# Patient Record
Sex: MALE | Race: WHITE | NOT HISPANIC OR LATINO | Employment: UNEMPLOYED | URBAN - NONMETROPOLITAN AREA
[De-identification: names, ages, dates, MRNs, and addresses within clinical notes are randomized per-mention and may not be internally consistent; named-entity substitution may affect disease eponyms.]

---
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
## 2019-01-06 ENCOUNTER — APPOINTMENT (OUTPATIENT)
Dept: LAB | Facility: HOSPITAL | Age: 19
End: 2019-01-06
Payer: COMMERCIAL

## 2019-01-06 ENCOUNTER — TRANSCRIBE ORDERS (OUTPATIENT)
Dept: ADMINISTRATIVE | Facility: HOSPITAL | Age: 19
End: 2019-01-06

## 2019-01-06 DIAGNOSIS — E13.8 DIABETES MELLITUS OF OTHER TYPE WITH COMPLICATION, UNSPECIFIED WHETHER LONG TERM INSULIN USE: Primary | ICD-10-CM

## 2019-01-06 DIAGNOSIS — E13.8 DIABETES MELLITUS OF OTHER TYPE WITH COMPLICATION, UNSPECIFIED WHETHER LONG TERM INSULIN USE: ICD-10-CM

## 2019-01-06 LAB
ANION GAP SERPL CALCULATED.3IONS-SCNC: 8 MMOL/L (ref 4–13)
BUN SERPL-MCNC: 6 MG/DL (ref 5–25)
CALCIUM SERPL-MCNC: 8.8 MG/DL (ref 8.3–10.1)
CHLORIDE SERPL-SCNC: 103 MMOL/L (ref 100–108)
CO2 SERPL-SCNC: 29 MMOL/L (ref 21–32)
CREAT SERPL-MCNC: 0.74 MG/DL (ref 0.6–1.3)
CREAT UR-MCNC: 47.6 MG/DL
EST. AVERAGE GLUCOSE BLD GHB EST-MCNC: 160 MG/DL
GFR SERPL CREATININE-BSD FRML MDRD: 134 ML/MIN/1.73SQ M
GLUCOSE P FAST SERPL-MCNC: 165 MG/DL (ref 65–99)
HBA1C MFR BLD: 7.2 % (ref 4.2–6.3)
MICROALBUMIN UR-MCNC: <5 MG/L (ref 0–20)
MICROALBUMIN/CREAT 24H UR: <11 MG/G CREATININE (ref 0–30)
POTASSIUM SERPL-SCNC: 4.4 MMOL/L (ref 3.5–5.3)
SODIUM SERPL-SCNC: 140 MMOL/L (ref 136–145)

## 2019-01-06 PROCEDURE — 84681 ASSAY OF C-PEPTIDE: CPT

## 2019-01-06 PROCEDURE — 80048 BASIC METABOLIC PNL TOTAL CA: CPT

## 2019-01-06 PROCEDURE — 82043 UR ALBUMIN QUANTITATIVE: CPT

## 2019-01-06 PROCEDURE — 83036 HEMOGLOBIN GLYCOSYLATED A1C: CPT

## 2019-01-06 PROCEDURE — 82570 ASSAY OF URINE CREATININE: CPT

## 2019-01-06 PROCEDURE — 36415 COLL VENOUS BLD VENIPUNCTURE: CPT

## 2019-01-08 LAB — C PEPTIDE SERPL-MCNC: 1.8 NG/ML (ref 1.1–4.4)

## 2019-01-19 ENCOUNTER — OFFICE VISIT (OUTPATIENT)
Dept: INTERNAL MEDICINE CLINIC | Facility: CLINIC | Age: 19
End: 2019-01-19
Payer: COMMERCIAL

## 2019-01-19 VITALS
DIASTOLIC BLOOD PRESSURE: 82 MMHG | SYSTOLIC BLOOD PRESSURE: 122 MMHG | TEMPERATURE: 96.1 F | RESPIRATION RATE: 18 BRPM | OXYGEN SATURATION: 98 % | WEIGHT: 163.8 LBS | BODY MASS INDEX: 26.33 KG/M2 | HEART RATE: 86 BPM | HEIGHT: 66 IN

## 2019-01-19 DIAGNOSIS — F63.81 INTERMITTENT EXPLOSIVE DISORDER: Primary | ICD-10-CM

## 2019-01-19 PROCEDURE — 99213 OFFICE O/P EST LOW 20 MIN: CPT | Performed by: PHYSICIAN ASSISTANT

## 2019-01-19 RX ORDER — OLANZAPINE 5 MG/1
10 TABLET ORAL
Qty: 60 TABLET | Refills: 2 | Status: SHIPPED | OUTPATIENT
Start: 2019-01-19 | End: 2019-05-02

## 2019-01-19 NOTE — PROGRESS NOTES
Assessment/Plan:    Intermittent explosive disorder  Will discontinue seroquel in favor of zyprexa to improve sedation and combat irritability and agitation  Recheck 1 month  Diagnoses and all orders for this visit:    Intermittent explosive disorder  -     OLANZapine (ZyPREXA) 5 mg tablet; Take 2 tablets (10 mg total) by mouth daily at bedtime          Subjective:      Patient ID: Bianca Maya is a 22 y o  male  Pt presents for evaluation of his mood and behavioral outbursts  At his last visit he was tried with seroquel  They titrated the dose to 100mg  He continued with outburts and then became very groggy the following morning  There is concern that he will not be able to tolerate the dose that is best for controlling his behaviors  Only other medication that was tried previously was ritalin  The following portions of the patient's history were reviewed and updated as appropriate:   He  has a past medical history of Allergic; Autism; Diabetes mellitus ([facility] [address] ); and Seasonal allergies  He   Patient Active Problem List    Diagnosis Date Noted    Intermittent explosive disorder 12/26/2018    Seasonal allergies 12/26/2018    Type 2 diabetes mellitus without complication, without long-term current use of insulin ([facility] [address] ) 12/26/2018    Acne vulgaris 12/26/2018     He  has a past surgical history that includes Cataract extraction, bilateral   His family history includes Alcohol abuse in his maternal grandmother; Mental illness in his maternal aunt; No Known Problems in his father and mother  He  reports that he is a non-smoker but has been exposed to tobacco smoke  He has never used smokeless tobacco  He reports that he does not drink alcohol or use drugs    Current Outpatient Prescriptions   Medication Sig Dispense Refill    Apple Cider Vinegar 600 MG CAPS Take 1,200 mg by mouth 3 (three) times a day with meals      cetirizine (ZyrTEC) 10 mg tablet Take 10 mg by mouth daily      Cholecalciferol (D3 VITAMIN PO) Take 5,000 Units by mouth daily      Cyanocobalamin (VITAMIN B-12) 6000 MCG SUBL Place 6,000 mcg under the tongue daily      doxycycline hyclate (VIBRAMYCIN) 100 mg capsule Take 100 mg by mouth daily      fluticasone (FLONASE) 50 mcg/act nasal spray 2 sprays into each nostril daily      metFORMIN (GLUCOPHAGE) 500 mg tablet Take 1,000 mg by mouth 2 (two) times a day with meals        OLANZapine (ZyPREXA) 5 mg tablet Take 2 tablets (10 mg total) by mouth daily at bedtime 60 tablet 2     No current facility-administered medications for this visit  Current Outpatient Prescriptions on File Prior to Visit   Medication Sig    Apple Cider Vinegar 600 MG CAPS Take 1,200 mg by mouth 3 (three) times a day with meals    cetirizine (ZyrTEC) 10 mg tablet Take 10 mg by mouth daily    Cholecalciferol (D3 VITAMIN PO) Take 5,000 Units by mouth daily    Cyanocobalamin (VITAMIN B-12) 6000 MCG SUBL Place 6,000 mcg under the tongue daily    doxycycline hyclate (VIBRAMYCIN) 100 mg capsule Take 100 mg by mouth daily    fluticasone (FLONASE) 50 mcg/act nasal spray 2 sprays into each nostril daily    metFORMIN (GLUCOPHAGE) 500 mg tablet Take 1,000 mg by mouth 2 (two) times a day with meals      [DISCONTINUED] QUEtiapine (SEROQUEL XR) 50 mg Take 1 tablet (50 mg total) by mouth daily at bedtime (Patient taking differently: Take 100 mg by mouth daily at bedtime  )     No current facility-administered medications on file prior to visit  He has No Known Allergies       Review of Systems   Constitutional: Negative for chills and fever  HENT: Negative for congestion, ear pain, hearing loss, postnasal drip, rhinorrhea, sinus pain, sinus pressure, sore throat and trouble swallowing  Eyes: Negative for pain and visual disturbance  Respiratory: Negative for cough, chest tightness, shortness of breath and wheezing  Cardiovascular: Negative  Negative for chest pain, palpitations and leg swelling  Gastrointestinal: Negative for abdominal pain, blood in stool, constipation, diarrhea, nausea and vomiting  Endocrine: Negative for cold intolerance, heat intolerance, polydipsia, polyphagia and polyuria  Genitourinary: Negative for difficulty urinating, dysuria, flank pain and urgency  Musculoskeletal: Negative for arthralgias, back pain, gait problem and myalgias  Skin: Negative for rash  Allergic/Immunologic: Negative  Neurological: Negative for dizziness, weakness, light-headedness and headaches  Hematological: Negative  Psychiatric/Behavioral: Positive for agitation and behavioral problems  Negative for dysphoric mood and sleep disturbance  The patient is not nervous/anxious  Objective:      /82 (BP Location: Left arm, Patient Position: Sitting, Cuff Size: Adult)   Pulse 86   Temp (!) 96 1 °F (35 6 °C) (Tympanic)   Resp 18   Ht 5' 6" (1 676 m)   Wt 74 3 kg (163 lb 12 8 oz)   SpO2 98%   BMI 26 44 kg/m²          Physical Exam   Constitutional: He is oriented to person, place, and time  He appears well-developed and well-nourished  No distress  HENT:   Head: Normocephalic and atraumatic  Right Ear: External ear normal    Left Ear: External ear normal    Nose: Nose normal    Mouth/Throat: Oropharynx is clear and moist  No oropharyngeal exudate  Eyes: Pupils are equal, round, and reactive to light  Conjunctivae and EOM are normal  Right eye exhibits no discharge  Left eye exhibits no discharge  No scleral icterus  Neck: Normal range of motion  Neck supple  No thyromegaly present  Cardiovascular: Normal rate, regular rhythm and normal heart sounds  Exam reveals no gallop and no friction rub  No murmur heard  Pulmonary/Chest: Effort normal and breath sounds normal  No respiratory distress  He has no wheezes  He has no rales  Abdominal: Soft  Bowel sounds are normal  He exhibits no distension  There is no tenderness  Musculoskeletal: Normal range of motion  He exhibits no edema, tenderness or deformity  Neurological: He is alert and oriented to person, place, and time  No cranial nerve deficit  Skin: Skin is warm and dry  He is not diaphoretic  Psychiatric: His behavior is normal  Judgment and thought content normal  His affect is labile

## 2019-01-19 NOTE — ASSESSMENT & PLAN NOTE
Will discontinue seroquel in favor of zyprexa to improve sedation and combat irritability and agitation  Recheck 1 month

## 2019-02-23 ENCOUNTER — OFFICE VISIT (OUTPATIENT)
Dept: INTERNAL MEDICINE CLINIC | Facility: CLINIC | Age: 19
End: 2019-02-23
Payer: COMMERCIAL

## 2019-02-23 VITALS
HEART RATE: 84 BPM | DIASTOLIC BLOOD PRESSURE: 74 MMHG | BODY MASS INDEX: 26.03 KG/M2 | WEIGHT: 162 LBS | OXYGEN SATURATION: 95 % | SYSTOLIC BLOOD PRESSURE: 106 MMHG | TEMPERATURE: 97 F | HEIGHT: 66 IN | RESPIRATION RATE: 18 BRPM

## 2019-02-23 DIAGNOSIS — F63.81 INTERMITTENT EXPLOSIVE DISORDER: Primary | ICD-10-CM

## 2019-02-23 PROCEDURE — 3008F BODY MASS INDEX DOCD: CPT | Performed by: PHYSICIAN ASSISTANT

## 2019-02-23 PROCEDURE — 99214 OFFICE O/P EST MOD 30 MIN: CPT | Performed by: PHYSICIAN ASSISTANT

## 2019-02-23 NOTE — PROGRESS NOTES
Assessment/Plan:    Intermittent explosive disorder  Pts symptoms are stable with current regime  No changes at present  Diagnoses and all orders for this visit:    Intermittent explosive disorder          Subjective:      Patient ID: Miah Larkin is a 22 y o  male  Pt presents for 1 month follow up  He is doing much better with zyprexa  He tolerates this better and behaviors are much improved  He only had 2 bad days since his last visit 1 month ago and they were not consecutive  He has only been taking 5mg and this works well       The following portions of the patient's history were reviewed and updated as appropriate:   He  has a past medical history of Allergic, Autism, Diabetes mellitus ([facility] ), and Seasonal allergies  He   Patient Active Problem List    Diagnosis Date Noted    Intermittent explosive disorder 12/26/2018    Seasonal allergies 12/26/2018    Type 2 diabetes mellitus without complication, without long-term current use of insulin ([facility] ) 12/26/2018    Acne vulgaris 12/26/2018     He  has a past surgical history that includes Cataract extraction, bilateral   His family history includes Alcohol abuse in his maternal grandmother; Mental illness in his maternal aunt; No Known Problems in his father and mother  He  reports that he is a non-smoker but has been exposed to tobacco smoke  He has never used smokeless tobacco  He reports that he does not drink alcohol or use drugs    Current Outpatient Medications   Medication Sig Dispense Refill    Apple Cider Vinegar 600 MG CAPS Take 1,200 mg by mouth 3 (three) times a day with meals      cetirizine (ZyrTEC) 10 mg tablet Take 10 mg by mouth daily      Cholecalciferol (D3 VITAMIN PO) Take 5,000 Units by mouth daily      Cyanocobalamin (VITAMIN B-12) 6000 MCG SUBL Place 6,000 mcg under the tongue daily      doxycycline hyclate (VIBRAMYCIN) 100 mg capsule Take 100 mg by mouth daily      fluticasone (FLONASE) 50 mcg/act nasal spray 2 sprays into each nostril daily      metFORMIN (GLUCOPHAGE) 500 mg tablet Take 1,000 mg by mouth 2 (two) times a day with meals        OLANZapine (ZyPREXA) 5 mg tablet Take 2 tablets (10 mg total) by mouth daily at bedtime 60 tablet 2     No current facility-administered medications for this visit  Current Outpatient Medications on File Prior to Visit   Medication Sig    Apple Cider Vinegar 600 MG CAPS Take 1,200 mg by mouth 3 (three) times a day with meals    cetirizine (ZyrTEC) 10 mg tablet Take 10 mg by mouth daily    Cholecalciferol (D3 VITAMIN PO) Take 5,000 Units by mouth daily    Cyanocobalamin (VITAMIN B-12) 6000 MCG SUBL Place 6,000 mcg under the tongue daily    doxycycline hyclate (VIBRAMYCIN) 100 mg capsule Take 100 mg by mouth daily    fluticasone (FLONASE) 50 mcg/act nasal spray 2 sprays into each nostril daily    metFORMIN (GLUCOPHAGE) 500 mg tablet Take 1,000 mg by mouth 2 (two) times a day with meals      OLANZapine (ZyPREXA) 5 mg tablet Take 2 tablets (10 mg total) by mouth daily at bedtime     No current facility-administered medications on file prior to visit  He has No Known Allergies       Review of Systems   Constitutional: Negative for chills and fever  HENT: Negative for congestion, ear pain, hearing loss, postnasal drip, rhinorrhea, sinus pressure, sinus pain, sore throat and trouble swallowing  Eyes: Negative for pain and visual disturbance  Respiratory: Negative for cough, chest tightness, shortness of breath and wheezing  Cardiovascular: Negative  Negative for chest pain, palpitations and leg swelling  Gastrointestinal: Negative for abdominal pain, blood in stool, constipation, diarrhea, nausea and vomiting  Endocrine: Negative for cold intolerance, heat intolerance, polydipsia, polyphagia and polyuria  Genitourinary: Negative for difficulty urinating, dysuria, flank pain and urgency     Musculoskeletal: Negative for arthralgias, back pain, gait problem and myalgias  Skin: Negative for rash  Allergic/Immunologic: Negative  Neurological: Negative for dizziness, weakness, light-headedness and headaches  Hematological: Negative  Psychiatric/Behavioral: Negative for behavioral problems, dysphoric mood and sleep disturbance  The patient is not nervous/anxious  Objective:      /74 (BP Location: Left arm, Patient Position: Sitting, Cuff Size: Adult)   Pulse 84   Temp (!) 97 °F (36 1 °C) (Tympanic)   Resp 18   Ht 5' 6" (1 676 m)   Wt 73 5 kg (162 lb)   SpO2 95%   BMI 26 15 kg/m²          Physical Exam   Constitutional: He is oriented to person, place, and time  He appears well-developed and well-nourished  No distress  HENT:   Head: Normocephalic and atraumatic  Right Ear: External ear normal    Left Ear: External ear normal    Nose: Nose normal    Mouth/Throat: Oropharynx is clear and moist  No oropharyngeal exudate  Eyes: Pupils are equal, round, and reactive to light  Conjunctivae and EOM are normal  Right eye exhibits no discharge  Left eye exhibits no discharge  No scleral icterus  Neck: Normal range of motion  Neck supple  No thyromegaly present  Cardiovascular: Normal rate, regular rhythm and normal heart sounds  Exam reveals no gallop and no friction rub  No murmur heard  Pulmonary/Chest: Effort normal and breath sounds normal  No respiratory distress  He has no wheezes  He has no rales  Abdominal: Soft  Bowel sounds are normal  He exhibits no distension  There is no tenderness  Musculoskeletal: Normal range of motion  He exhibits no edema, tenderness or deformity  Neurological: He is alert and oriented to person, place, and time  No cranial nerve deficit  Skin: Skin is warm and dry  He is not diaphoretic  Psychiatric: He has a normal mood and affect   His behavior is normal  Judgment and thought content normal

## 2019-03-07 ENCOUNTER — TELEPHONE (OUTPATIENT)
Dept: INTERNAL MEDICINE CLINIC | Facility: CLINIC | Age: 19
End: 2019-03-07

## 2019-03-07 NOTE — TELEPHONE ENCOUNTER
I called and spoke to [facility], she will increase med and when I told her about taking him to the ED, she said no because she took him in January and they did nothing for him

## 2019-03-07 NOTE — TELEPHONE ENCOUNTER
I would recommend he double his zyprexa and if no improvement call back   If any fear for safety then ED
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
## 2019-04-14 ENCOUNTER — TRANSCRIBE ORDERS (OUTPATIENT)
Dept: ADMINISTRATIVE | Facility: HOSPITAL | Age: 19
End: 2019-04-14

## 2019-04-14 ENCOUNTER — APPOINTMENT (OUTPATIENT)
Dept: LAB | Facility: HOSPITAL | Age: 19
End: 2019-04-14
Payer: COMMERCIAL

## 2019-04-14 DIAGNOSIS — E11.9 DIABETES MELLITUS WITH NO COMPLICATION (HCC): ICD-10-CM

## 2019-04-14 DIAGNOSIS — E78.2 MIXED HYPERLIPIDEMIA: ICD-10-CM

## 2019-04-14 DIAGNOSIS — E11.9 DIABETES MELLITUS WITH NO COMPLICATION (HCC): Primary | ICD-10-CM

## 2019-04-14 LAB
ALBUMIN SERPL BCP-MCNC: 3.9 G/DL (ref 3.5–5)
ALP SERPL-CCNC: 63 U/L (ref 46–484)
ALT SERPL W P-5'-P-CCNC: 34 U/L (ref 12–78)
ANION GAP SERPL CALCULATED.3IONS-SCNC: 12 MMOL/L (ref 4–13)
AST SERPL W P-5'-P-CCNC: 13 U/L (ref 5–45)
BILIRUB SERPL-MCNC: 0.4 MG/DL (ref 0.2–1)
BUN SERPL-MCNC: 8 MG/DL (ref 5–25)
CALCIUM SERPL-MCNC: 9.1 MG/DL (ref 8.3–10.1)
CHLORIDE SERPL-SCNC: 104 MMOL/L (ref 100–108)
CHOLEST SERPL-MCNC: 122 MG/DL (ref 50–200)
CO2 SERPL-SCNC: 26 MMOL/L (ref 21–32)
CREAT SERPL-MCNC: 0.61 MG/DL (ref 0.6–1.3)
EST. AVERAGE GLUCOSE BLD GHB EST-MCNC: 131 MG/DL
GFR SERPL CREATININE-BSD FRML MDRD: 145 ML/MIN/1.73SQ M
GLUCOSE P FAST SERPL-MCNC: 154 MG/DL (ref 65–99)
HBA1C MFR BLD: 6.2 % (ref 4.2–6.3)
HDLC SERPL-MCNC: 46 MG/DL (ref 40–60)
LDLC SERPL CALC-MCNC: 69 MG/DL (ref 0–100)
NONHDLC SERPL-MCNC: 76 MG/DL
POTASSIUM SERPL-SCNC: 4 MMOL/L (ref 3.5–5.3)
PROT SERPL-MCNC: 7.3 G/DL (ref 6.4–8.2)
SODIUM SERPL-SCNC: 142 MMOL/L (ref 136–145)
TRIGL SERPL-MCNC: 35 MG/DL

## 2019-04-14 PROCEDURE — 80061 LIPID PANEL: CPT

## 2019-04-14 PROCEDURE — 80053 COMPREHEN METABOLIC PANEL: CPT

## 2019-04-14 PROCEDURE — 36415 COLL VENOUS BLD VENIPUNCTURE: CPT

## 2019-04-14 PROCEDURE — 83036 HEMOGLOBIN GLYCOSYLATED A1C: CPT

## 2019-05-02 ENCOUNTER — OFFICE VISIT (OUTPATIENT)
Dept: INTERNAL MEDICINE CLINIC | Facility: CLINIC | Age: 19
End: 2019-05-02
Payer: COMMERCIAL

## 2019-05-02 VITALS
BODY MASS INDEX: 25.71 KG/M2 | HEIGHT: 66 IN | SYSTOLIC BLOOD PRESSURE: 112 MMHG | DIASTOLIC BLOOD PRESSURE: 70 MMHG | TEMPERATURE: 97.6 F | WEIGHT: 160 LBS | RESPIRATION RATE: 16 BRPM | HEART RATE: 98 BPM

## 2019-05-02 DIAGNOSIS — F63.81 INTERMITTENT EXPLOSIVE DISORDER: Primary | ICD-10-CM

## 2019-05-02 PROCEDURE — 99213 OFFICE O/P EST LOW 20 MIN: CPT | Performed by: PHYSICIAN ASSISTANT

## 2019-05-02 PROCEDURE — 1036F TOBACCO NON-USER: CPT | Performed by: PHYSICIAN ASSISTANT

## 2019-05-02 PROCEDURE — 3008F BODY MASS INDEX DOCD: CPT | Performed by: PHYSICIAN ASSISTANT

## 2019-08-04 ENCOUNTER — TRANSCRIBE ORDERS (OUTPATIENT)
Dept: ADMINISTRATIVE | Facility: HOSPITAL | Age: 19
End: 2019-08-04

## 2019-08-04 ENCOUNTER — APPOINTMENT (OUTPATIENT)
Dept: LAB | Facility: HOSPITAL | Age: 19
End: 2019-08-04
Attending: FAMILY MEDICINE
Payer: COMMERCIAL

## 2019-08-04 DIAGNOSIS — E11.9 TYPE 2 DIABETES MELLITUS WITHOUT COMPLICATION, UNSPECIFIED WHETHER LONG TERM INSULIN USE (HCC): ICD-10-CM

## 2019-08-04 DIAGNOSIS — E11.9 TYPE 2 DIABETES MELLITUS WITHOUT COMPLICATION, UNSPECIFIED WHETHER LONG TERM INSULIN USE (HCC): Primary | ICD-10-CM

## 2019-08-04 LAB
ANION GAP SERPL CALCULATED.3IONS-SCNC: 10 MMOL/L (ref 4–13)
BUN SERPL-MCNC: 8 MG/DL (ref 5–25)
CALCIUM SERPL-MCNC: 9.2 MG/DL (ref 8.3–10.1)
CHLORIDE SERPL-SCNC: 104 MMOL/L (ref 100–108)
CO2 SERPL-SCNC: 28 MMOL/L (ref 21–32)
CREAT SERPL-MCNC: 0.74 MG/DL (ref 0.6–1.3)
EST. AVERAGE GLUCOSE BLD GHB EST-MCNC: 140 MG/DL
GFR SERPL CREATININE-BSD FRML MDRD: 133 ML/MIN/1.73SQ M
GLUCOSE P FAST SERPL-MCNC: 120 MG/DL (ref 65–99)
HBA1C MFR BLD: 6.5 % (ref 4.2–6.3)
POTASSIUM SERPL-SCNC: 4.2 MMOL/L (ref 3.5–5.3)
SODIUM SERPL-SCNC: 142 MMOL/L (ref 136–145)

## 2019-08-04 PROCEDURE — 80048 BASIC METABOLIC PNL TOTAL CA: CPT

## 2019-08-04 PROCEDURE — 83036 HEMOGLOBIN GLYCOSYLATED A1C: CPT

## 2019-08-04 PROCEDURE — 36415 COLL VENOUS BLD VENIPUNCTURE: CPT

## 2019-08-07 ENCOUNTER — OFFICE VISIT (OUTPATIENT)
Dept: FAMILY MEDICINE CLINIC | Facility: CLINIC | Age: 19
End: 2019-08-07
Payer: COMMERCIAL

## 2019-08-07 VITALS
DIASTOLIC BLOOD PRESSURE: 78 MMHG | OXYGEN SATURATION: 98 % | SYSTOLIC BLOOD PRESSURE: 120 MMHG | HEART RATE: 99 BPM | BODY MASS INDEX: 27.44 KG/M2 | HEIGHT: 65 IN | WEIGHT: 164.7 LBS

## 2019-08-07 DIAGNOSIS — E11.9 TYPE 2 DIABETES MELLITUS WITHOUT COMPLICATION, WITHOUT LONG-TERM CURRENT USE OF INSULIN (HCC): Primary | ICD-10-CM

## 2019-08-07 DIAGNOSIS — J30.2 SEASONAL ALLERGIES: ICD-10-CM

## 2019-08-07 PROCEDURE — 99213 OFFICE O/P EST LOW 20 MIN: CPT | Performed by: FAMILY MEDICINE

## 2019-08-07 PROCEDURE — 3008F BODY MASS INDEX DOCD: CPT | Performed by: FAMILY MEDICINE

## 2019-08-07 PROCEDURE — 1036F TOBACCO NON-USER: CPT | Performed by: FAMILY MEDICINE

## 2019-08-07 RX ORDER — CLINDAMYCIN AND BENZOYL PEROXIDE 10; 50 MG/G; MG/G
1 GEL TOPICAL 2 TIMES DAILY
Refills: 5 | COMMUNITY
Start: 2019-06-06 | End: 2019-11-20 | Stop reason: CLARIF

## 2019-08-07 RX ORDER — FLUTICASONE PROPIONATE 50 MCG
2 SPRAY, SUSPENSION (ML) NASAL DAILY
Qty: 1 BOTTLE | Refills: 5 | Status: SHIPPED | OUTPATIENT
Start: 2019-08-07 | End: 2020-04-01

## 2019-08-07 NOTE — PROGRESS NOTES
Assessment/Plan:    Type 2 diabetes mellitus without complication, without long-term current use of insulin ([facility])  Lab Results   Component Value Date    HGBA1C 6 5 (H) 08/04/2019     I reviewed with the patient and his family his blood test results  His fasting blood glucose was 120  His hemoglobin A1c is 6 5%  Diabetes is under good control  I refilled the patient's metformin 1000 mg twice a day  Patient had a recent dilated eye exam by Julia How  We discussed diabetic foot care  I encouraged the patient to continue to exercise  No results for input(s): POCGLU in the last 72 hours  Blood Sugar Average: Last 72 hrs:         Diagnoses and all orders for this visit:    Type 2 diabetes mellitus without complication, without long-term current use of insulin ([facility])  -     metFORMIN (GLUCOPHAGE) 1000 MG tablet; Take 1 tablet (1,000 mg total) by mouth 2 (two) times a day  -     Comprehensive metabolic panel; Future  -     Hemoglobin A1C; Future  -     Microalbumin / creatinine urine ratio    Seasonal allergies  -     fluticasone (FLONASE) 50 mcg/act nasal spray; 2 sprays into each nostril daily    Other orders  -     clindamycin-benzoyl peroxide (BENZACLIN) gel; Apply 1 application topically 2 (two) times a day To back  -     Discontinue: metFORMIN (GLUCOPHAGE) 1000 MG tablet; Take 1 tablet by mouth 2 (two) times a day          Subjective:      Patient ID: Briana Kenny is a 20 y o  male  This patient is a 69-year-old white male presents to the office today with his mother and father for re-evaluation of his type 2 diabetes mellitus  The patient is doing well  He is trying to watch his diet  He has been exercising  His dad reports that he ran 1 hour on the treadmill today  He has been compliant with use of his medications        The following portions of the patient's history were reviewed and updated as appropriate: allergies, current medications, past family history, past medical history, past social history, past surgical history and problem list     Review of Systems   Cardiovascular: Negative for chest pain, palpitations and leg swelling  Gastrointestinal: Negative for abdominal distention, abdominal pain and constipation  Endocrine: Negative  Objective:      /78   Pulse 99   Ht 5' 5" (1 651 m)   Wt 74 7 kg (164 lb 11 2 oz)   SpO2 98%   BMI 27 41 kg/m²          Physical Exam   Constitutional: He appears well-developed and well-nourished  No distress  HENT:   Head: Normocephalic and atraumatic  Right Ear: External ear normal    Left Ear: External ear normal    Mouth/Throat: Oropharynx is clear and moist  No oropharyngeal exudate  Tympanic membranes were clear   Eyes: Pupils are equal, round, and reactive to light  Conjunctivae are normal  No scleral icterus  Neck: Neck supple  No tracheal deviation present  No thyromegaly present  Cardiovascular: Normal rate, regular rhythm and normal heart sounds  Exam reveals no gallop and no friction rub  Pulses are no weak pulses  No murmur heard  Pulses:       Dorsalis pedis pulses are 2+ on the right side, and 2+ on the left side  Posterior tibial pulses are 2+ on the right side, and 2+ on the left side  Pulmonary/Chest: Effort normal and breath sounds normal  No stridor  No respiratory distress  He has no wheezes  He has no rales  Abdominal: Soft  Bowel sounds are normal  He exhibits no distension and no mass  There is no tenderness  There is no rebound and no guarding  Feet:   Right Foot:   Skin Integrity: Negative for ulcer, skin breakdown, erythema, warmth, callus or dry skin  Left Foot:   Skin Integrity: Negative for ulcer, skin breakdown, erythema, warmth, callus or dry skin  Lymphadenopathy:     He has no cervical adenopathy  Vitals reviewed  extremities:  Without cyanosis, clubbing, or edema  Patient's shoes and socks removed  Right Foot/Ankle   Right Foot Inspection  Skin Exam: skin normal and skin intact no dry skin, no warmth, no callus, no erythema, no maceration, no abnormal color, no pre-ulcer, no ulcer and no callus                          Toe Exam: no swelling, no tenderness, erythema and  no right toe deformity  Sensory   Vibration: intact  Proprioception: intact   Monofilament testing: intact  Vascular  Capillary refills: < 3 seconds  The right DP pulse is 2+  The right PT pulse is 2+  Left Foot/Ankle  Left Foot Inspection  Skin Exam: skin normal and skin intactno dry skin, no warmth, no erythema, no maceration, normal color, no pre-ulcer, no ulcer and no callus                         Toe Exam: no swelling, no tenderness, no erythema and no left toe deformity                   Sensory   Vibration: intact  Proprioception: intact  Monofilament: intact  Vascular  Capillary refills: < 3 seconds  The left DP pulse is 2+  The left PT pulse is 2+  Assign Risk Category:  No deformity present; No loss of protective sensation;  No weak pulses       Risk: 0

## 2019-08-07 NOTE — PATIENT INSTRUCTIONS
Foot Care for People with Diabetes   AMBULATORY CARE:   What you need to know about foot care:   · Foot care helps protect your feet and prevent foot ulcers or sores  Long-term high blood sugar levels can damage the blood vessels and nerves in your legs and feet  This damage makes it hard to feel pressure, pain, temperature, and touch  You may not be able to feel a cut or sore, or shoes that are too tight  Foot care is needed to prevent serious problems, such as an infection or amputation  · Diabetes may cause your toes to become crooked or curved under  These changes may affect the way you walk and can lead to increased pressure on your foot  The pressure can decrease blood flow to your feet  Lack of blood flow increases your risk for a foot ulcer  Do not ignore small problems, such as dry skin or small wounds  These can become life-threatening over time without proper care  Contact your healthcare provider if:   · Your feet become numb, weak, or hard to move  · You have pus draining from a sore on your foot  · You have a wound on your foot that gets bigger, deeper, or does not heal      · You see blisters, cuts, scratches, calluses, or sores on your foot  · You have a fever, and your feet become red, warm, and swollen  · Your toenails become thick, curled, or yellow  · You find it hard to check your feet because your vision is poor  · You have questions or concerns about your condition or care  How to care for your feet:   · Check your feet each day  Look at your whole foot, including the bottom, and between and under your toes  Check for wounds, corns, and calluses  Use a mirror to see the bottom of your feet  The skin on your feet may be shiny, tight, or darker than normal  Your feet may also be cold and pale  Feel your feet by running your hands along the tops, bottoms, sides, and between your toes   Redness, swelling, and warmth are signs of blood flow problems that can lead to a foot ulcer  Do not try to remove corns or calluses yourself  · Wash your feet each day with soap and warm water  Do not use hot water, because this can injure your foot  Dry your feet gently with a towel after you wash them  Dry between and under your toes  · Apply lotion or a moisturizer on your dry feet  Ask your healthcare provider what lotions are best to use  Do not put lotion or moisturizer between your toes  · Cut your toenails correctly  File or cut your toenails straight across  Use a soft brush to clean around your toenails  If your toenails are very thick, you may need to have a healthcare provider or specialist cut them  · Protect your feet  Do not walk barefoot or wear your shoes without socks  Check your shoes for rocks or other objects that can hurt your feet  Wear cotton socks to help keep your feet dry  Wear socks without toe seams, or wear them with the seams inside out  Change your socks each day  Do not wear socks that are dirty or damp  · Wear shoes that fit well  Wear shoes that do not rub against any area of your feet  Your shoes should be ½ to ¾ inch (1 to 2 centimeters) longer than your feet  Your shoes should also have extra space around the widest part of your feet  Walking or athletic shoes with laces or straps that adjust are best  Ask your healthcare provider for help to choose shoes that fit you best  Ask him if you need to wear an insert, orthotic, or bandage on your feet  · Go to your follow-up visits  Your healthcare provider will do a foot exam at least once a year  You may need a foot exam more often if you have nerve damage, foot deformities, or ulcers  He will check for nerve damage and how well you can feel your feet  He will check your shoes to see if they fit well  Follow up with your healthcare provider or foot specialist as directed: You will need to have your feet checked at least once a year   You may need a foot exam more often if you have nerve damage, foot deformities, or ulcers  Write down your questions so you remember to ask them during your visits  © 2017 [address] [employer] Information is for End User's use only and may not be sold, redistributed or otherwise used for commercial purposes  All illustrations and images included in CareNotes® are the copyrighted property of [employer]  or [employer]  The above information is an  only  It is not intended as medical advice for individual conditions or treatments  Talk to your doctor, nurse or pharmacist before following any medical regimen to see if it is safe and effective for you

## 2019-08-07 NOTE — ASSESSMENT & PLAN NOTE
Lab Results   Component Value Date    HGBA1C 6 5 (H) 08/04/2019     I reviewed with the patient and his family his blood test results  His fasting blood glucose was 120  His hemoglobin A1c is 6 5%  Diabetes is under good control  I refilled the patient's metformin 1000 mg twice a day  Patient had a recent dilated eye exam by Mesfin Holguin  We discussed diabetic foot care  I encouraged the patient to continue to exercise  No results for input(s): POCGLU in the last 72 hours      Blood Sugar Average: Last 72 hrs:

## 2019-08-28 DIAGNOSIS — E11.9 TYPE 2 DIABETES MELLITUS WITHOUT COMPLICATION, WITHOUT LONG-TERM CURRENT USE OF INSULIN (HCC): Primary | ICD-10-CM

## 2019-09-22 ENCOUNTER — OFFICE VISIT (OUTPATIENT)
Dept: URGENT CARE | Facility: CLINIC | Age: 19
End: 2019-09-22
Payer: COMMERCIAL

## 2019-09-22 VITALS
SYSTOLIC BLOOD PRESSURE: 118 MMHG | HEART RATE: 101 BPM | OXYGEN SATURATION: 98 % | DIASTOLIC BLOOD PRESSURE: 73 MMHG | RESPIRATION RATE: 18 BRPM | TEMPERATURE: 98.3 F

## 2019-09-22 DIAGNOSIS — J02.9 ACUTE PHARYNGITIS, UNSPECIFIED ETIOLOGY: Primary | ICD-10-CM

## 2019-09-22 DIAGNOSIS — J02.9 SORE THROAT: ICD-10-CM

## 2019-09-22 LAB — S PYO AG THROAT QL: NEGATIVE

## 2019-09-22 PROCEDURE — 99213 OFFICE O/P EST LOW 20 MIN: CPT | Performed by: PHYSICIAN ASSISTANT

## 2019-09-22 PROCEDURE — 87880 STREP A ASSAY W/OPTIC: CPT | Performed by: PHYSICIAN ASSISTANT

## 2019-09-22 RX ORDER — AMOXICILLIN 875 MG/1
875 TABLET, COATED ORAL 2 TIMES DAILY
Qty: 20 TABLET | Refills: 0 | Status: SHIPPED | OUTPATIENT
Start: 2019-09-22 | End: 2019-09-29

## 2019-09-22 NOTE — PROGRESS NOTES
[facility]        NAME: Peter Malhotra is a 20 y o  male  : 2000    MRN: [id number]  DATE: 2019  TIME: 2:14 PM    Assessment and Plan   Acute pharyngitis, unspecified etiology [J02 9]  1  Acute pharyngitis, unspecified etiology  amoxicillin (AMOXIL) 875 mg tablet   2  Sore throat  POCT rapid strepA         Patient Instructions       Follow up with PCP in 3-5 days  Proceed to  ER if symptoms worsen  Chief Complaint     Chief Complaint   Patient presents with    Sore Throat     Pt c/o a cough and sore throat since this morning  History of Present Illness       Patient is nonverbal but will answer questions with yes or no Delanna Bosworth of the head  Patient is complaining of this morning to his family of a sore throat  They have also noticed a dry cough  No fever chills ear pain runny stuffy nose  He does have a slightly decreased appetite today  Review of Systems   Review of Systems   Constitutional: Positive for appetite change  Negative for chills and fever  HENT: Positive for sore throat  Negative for congestion, ear pain, postnasal drip, rhinorrhea and trouble swallowing  Respiratory: Positive for cough  Negative for wheezing  Gastrointestinal: Positive for vomiting (One episode of mucus)  Negative for nausea  Skin: Negative for rash  Neurological: Negative for headaches  Hematological: Negative for adenopathy           Current Medications       Current Outpatient Medications:     amoxicillin (AMOXIL) 875 mg tablet, Take 1 tablet (875 mg total) by mouth 2 (two) times a day for 7 days, Disp: 20 tablet, Rfl: 0    Apple Cider Vinegar 600 MG CAPS, Take 1,200 mg by mouth 3 (three) times a day with meals, Disp: , Rfl:     cetirizine (ZyrTEC) 10 mg tablet, Take 10 mg by mouth daily, Disp: , Rfl:     Cholecalciferol (D3 VITAMIN PO), Take 5,000 Units by mouth daily, Disp: , Rfl:     clindamycin-benzoyl peroxide (BENZACLIN) gel, Apply 1 application topically 2 (two) times a day To back, Disp: , Rfl: 5    Cyanocobalamin (VITAMIN B-12) 6000 MCG SUBL, Place 6,000 mcg under the tongue daily, Disp: , Rfl:     doxycycline hyclate (VIBRAMYCIN) 100 mg capsule, Take 100 mg by mouth daily, Disp: , Rfl:     fluticasone (FLONASE) 50 mcg/act nasal spray, 2 sprays into each nostril daily, Disp: 1 Bottle, Rfl: 5    metFORMIN (GLUCOPHAGE) 1000 MG tablet, Take 1 tablet (1,000 mg total) by mouth 2 (two) times a day, Disp: 60 tablet, Rfl: 5    Current Allergies     Allergies as of 09/22/2019    (No Known Allergies)            The following portions of the patient's history were reviewed and updated as appropriate: allergies, current medications, past family history, past medical history, past social history, past surgical history and problem list      Past Medical History:   Diagnosis Date    Allergic     Autism     Diabetes mellitus ([facility] [address] )     type 2 without complications    Seasonal allergies        Past Surgical History:   Procedure Laterality Date    CATARACT EXTRACTION, BILATERAL         Family History   Problem Relation Age of Onset    No Known Problems Mother     No Known Problems Father     Alcohol abuse Maternal Grandmother     Mental illness Maternal Aunt          Medications have been verified  Objective   /73   Pulse 101   Temp 98 3 °F (36 8 °C)   Resp 18   SpO2 98%        Physical Exam     Physical Exam   Constitutional: He is oriented to person, place, and time  He appears well-developed and well-nourished  HENT:   Head: Normocephalic and atraumatic  Right Ear: Tympanic membrane normal    Left Ear: Tympanic membrane normal    Mouth/Throat: Uvula is midline  No uvula swelling  Erythema soft palate uvula no exudates  Neck: Neck supple  Cardiovascular: Normal rate, regular rhythm and normal heart sounds  Lymphadenopathy:     He has no cervical adenopathy  Neurological: He is alert and oriented to person, place, and time     Skin: Skin is warm and dry  No rash noted  Psychiatric: He has a normal mood and affect  His behavior is normal    Nursing note and vitals reviewed

## 2019-09-22 NOTE — PATIENT INSTRUCTIONS
Pharyngitis   AMBULATORY CARE:   Pharyngitis , or sore throat, is inflammation of the tissues and structures in your pharynx (throat)  Pharyngitis is most often caused by bacteria  It may also be caused by a cold or flu virus  Other causes include smoking, allergies, or acid reflux  Signs and symptoms that may occur with pharyngitis:   · Sore throat or pain when you swallow    · Fever, chills, and body aches    · Hoarse or raspy voice    · Cough, runny or stuffy nose, itchy or watery eyes    · Headache    · Upset stomach and loss of appetite    · Mild neck stiffness    · Swollen glands that feel like hard lumps when you touch your neck    · White and yellow pus-filled blisters in the back of your throat  Call 911 for any of the following:   · You have trouble breathing or swallowing because your throat is swollen or sore  Seek care immediately if:   · You are drooling because it hurts too much to swallow  · Your fever is higher than 102? F (39?C) or lasts longer than 3 days  · You are confused  · You taste blood in your throat  Contact your healthcare provider if:   · Your throat pain gets worse  · You have a painful lump in your throat that does not go away after 5 days  · Your symptoms do not improve after 5 days  · You have questions or concerns about your condition or care  Treatment for pharyngitis:  Viral pharyngitis will go away on its own without treatment  Your sore throat should start to feel better in 3 to 5 days for both viral and bacterial infections  You may need any of the following:  · Antibiotics  treat a bacterial infection  · NSAIDs , such as ibuprofen, help decrease swelling, pain, and fever  NSAIDs can cause stomach bleeding or kidney problems in certain people  If you take blood thinner medicine, always ask your healthcare provider if NSAIDs are safe for you  Always read the medicine label and follow directions  · Acetaminophen  decreases pain and fever   It is available without a doctor's order  Ask how much to take and how often to take it  Follow directions  Acetaminophen can cause liver damage if not taken correctly  Manage your symptoms:   · Gargle salt water  Mix ¼ teaspoon salt in an 8 ounce glass of warm water and gargle  This may help decrease swelling in your throat  · Drink liquids as directed  You may need to drink more liquids than usual  Liquids may help soothe your throat and prevent dehydration  Ask how much liquid to drink each day and which liquids are best for you  · Use a cool-steam humidifier  to help moisten the air in your room and calm your cough  · Soothe your throat  with cough drops, ice, soft foods, or popsicles  Prevent the spread of pharyngitis:  Cover your mouth and nose when you cough or sneeze  Do not share food or drinks  Wash your hands often  Use soap and water  If soap and water are unavailable, use an alcohol based hand   Follow up with your healthcare provider as directed:  Write down your questions so you remember to ask them during your visits  © 2017 [facility] Information is for End User's use only and may not be sold, redistributed or otherwise used for commercial purposes  All illustrations and images included in CareNotes® are the copyrighted property of [employer]  or [employer] Burris  The above information is an  only  It is not intended as medical advice for individual conditions or treatments  Talk to your doctor, nurse or pharmacist before following any medical regimen to see if it is safe and effective for you

## 2019-10-04 DIAGNOSIS — J30.2 SEASONAL ALLERGIES: ICD-10-CM

## 2019-10-04 DIAGNOSIS — L70.0 ACNE VULGARIS: Primary | ICD-10-CM

## 2019-10-04 RX ORDER — CETIRIZINE HYDROCHLORIDE 10 MG/1
TABLET ORAL
Qty: 30 TABLET | Refills: 5 | Status: SHIPPED | OUTPATIENT
Start: 2019-10-04 | End: 2020-04-01

## 2019-10-04 RX ORDER — DOXYCYCLINE HYCLATE 100 MG/1
CAPSULE ORAL
Qty: 30 CAPSULE | Refills: 5 | Status: SHIPPED | OUTPATIENT
Start: 2019-10-04 | End: 2020-03-12

## 2019-11-14 ENCOUNTER — TELEPHONE (OUTPATIENT)
Dept: FAMILY MEDICINE CLINIC | Facility: CLINIC | Age: 19
End: 2019-11-14

## 2019-11-16 ENCOUNTER — APPOINTMENT (OUTPATIENT)
Dept: LAB | Facility: HOSPITAL | Age: 19
End: 2019-11-16
Attending: FAMILY MEDICINE
Payer: COMMERCIAL

## 2019-11-16 DIAGNOSIS — E11.9 TYPE 2 DIABETES MELLITUS WITHOUT COMPLICATION, WITHOUT LONG-TERM CURRENT USE OF INSULIN (HCC): ICD-10-CM

## 2019-11-16 LAB
ALBUMIN SERPL BCP-MCNC: 4 G/DL (ref 3.5–5)
ALP SERPL-CCNC: 63 U/L (ref 46–116)
ALT SERPL W P-5'-P-CCNC: 26 U/L (ref 12–78)
ANION GAP SERPL CALCULATED.3IONS-SCNC: 9 MMOL/L (ref 4–13)
AST SERPL W P-5'-P-CCNC: 15 U/L (ref 5–45)
BILIRUB SERPL-MCNC: 0.3 MG/DL (ref 0.2–1)
BUN SERPL-MCNC: 10 MG/DL (ref 5–25)
CALCIUM SERPL-MCNC: 8.8 MG/DL (ref 8.3–10.1)
CHLORIDE SERPL-SCNC: 105 MMOL/L (ref 100–108)
CO2 SERPL-SCNC: 26 MMOL/L (ref 21–32)
CREAT SERPL-MCNC: 0.65 MG/DL (ref 0.6–1.3)
CREAT UR-MCNC: 97 MG/DL
EST. AVERAGE GLUCOSE BLD GHB EST-MCNC: 123 MG/DL
GFR SERPL CREATININE-BSD FRML MDRD: 140 ML/MIN/1.73SQ M
GLUCOSE P FAST SERPL-MCNC: 133 MG/DL (ref 65–99)
HBA1C MFR BLD: 5.9 % (ref 4.2–6.3)
MICROALBUMIN UR-MCNC: 7 MG/L (ref 0–20)
MICROALBUMIN/CREAT 24H UR: 7 MG/G CREATININE (ref 0–30)
POTASSIUM SERPL-SCNC: 4.2 MMOL/L (ref 3.5–5.3)
PROT SERPL-MCNC: 7 G/DL (ref 6.4–8.2)
SODIUM SERPL-SCNC: 140 MMOL/L (ref 136–145)

## 2019-11-16 PROCEDURE — 36415 COLL VENOUS BLD VENIPUNCTURE: CPT

## 2019-11-16 PROCEDURE — 80053 COMPREHEN METABOLIC PANEL: CPT

## 2019-11-16 PROCEDURE — 82570 ASSAY OF URINE CREATININE: CPT

## 2019-11-16 PROCEDURE — 83036 HEMOGLOBIN GLYCOSYLATED A1C: CPT

## 2019-11-16 PROCEDURE — 82043 UR ALBUMIN QUANTITATIVE: CPT

## 2019-11-16 PROCEDURE — 3061F NEG MICROALBUMINURIA REV: CPT | Performed by: FAMILY MEDICINE

## 2019-11-20 ENCOUNTER — OFFICE VISIT (OUTPATIENT)
Dept: FAMILY MEDICINE CLINIC | Facility: CLINIC | Age: 19
End: 2019-11-20
Payer: COMMERCIAL

## 2019-11-20 VITALS
HEIGHT: 65 IN | DIASTOLIC BLOOD PRESSURE: 78 MMHG | WEIGHT: 168.7 LBS | BODY MASS INDEX: 28.11 KG/M2 | OXYGEN SATURATION: 97 % | SYSTOLIC BLOOD PRESSURE: 122 MMHG | HEART RATE: 101 BPM

## 2019-11-20 DIAGNOSIS — E11.9 TYPE 2 DIABETES MELLITUS WITHOUT COMPLICATION, WITHOUT LONG-TERM CURRENT USE OF INSULIN (HCC): Primary | ICD-10-CM

## 2019-11-20 DIAGNOSIS — L70.0 ACNE VULGARIS: ICD-10-CM

## 2019-11-20 DIAGNOSIS — E78.5 DYSLIPIDEMIA: ICD-10-CM

## 2019-11-20 PROCEDURE — 99213 OFFICE O/P EST LOW 20 MIN: CPT | Performed by: FAMILY MEDICINE

## 2019-11-20 PROCEDURE — 1036F TOBACCO NON-USER: CPT | Performed by: FAMILY MEDICINE

## 2019-11-20 RX ORDER — CLINDAMYCIN PHOSPHATE 11.9 MG/ML
SOLUTION TOPICAL 2 TIMES DAILY
Qty: 60 ML | Refills: 5 | Status: SHIPPED | OUTPATIENT
Start: 2019-11-20 | End: 2020-02-26 | Stop reason: SDUPTHER

## 2019-11-20 NOTE — PATIENT INSTRUCTIONS
Acne   AMBULATORY CARE:   Acne  is a skin condition that is common in adolescents  It usually gets better over time, and is usually gone by about age 22  Acne can continue into adulthood for some people  Different types of acne:  Acne most often appears on the face, neck, upper chest, back, and upper arms  · Whiteheads  are closed, white bumps that form when the pore is completely blocked  · Blackheads  are tiny, dark spots that form when the pore is blocked but stays open  · Pimples  are inflamed bumps that contain pus  They are often caused by clogged pores  Pimples develop when whiteheads or blackheads get infected  · Cystic acne  is made up of large inflamed nodules or cysts that contain pus  They look like large pimples and form deep inside the skin  They may cause pain and scars  Contact your healthcare provider if:   · You use retinoid medicine and you think you might be pregnant  · You use retinoid medicine and begin to have mood swings or personality changes  · You feel depressed  · You have a fever and inflammation of your skin  · Your acne does not get better, even after treatment  · You have questions or concerns about your condition or care  Treatment  depends on how severe your acne is  Your healthcare provider may recommend any of the following:  · Over-the-counter acne medicines  with benzoyl peroxide and salicylic acid may help to treat mild acne  They are available in the form of gels, lotions, creams, pads, or soaps  It may take several weeks for you to see an improvement  Follow the directions on the medicine label  Do not use more than directed  This medicine can cause dry and red skin if you use too much  · Prescription medicines  may be needed if over-the-counter medicines do not help after 2 months  You may need to take more than one kind of medicine to treat your acne  This may include antibiotics that kill bacteria and help decrease swelling   A type of prescription acne medicine called retinoids may cause serious birth defects  Do not  use this medicine if you are pregnant or may become pregnant  · Light therapy  may help decrease your acne  Ask your healthcare provider for more information about light therapy  Manage or prevent acne:   · Wash your face 2 times a day  with a gentle cleanser  This helps decrease oil buildup that leads to acne  Also wash your face if you have been sweating a lot, such as after exercise  · Use oil-free products  This includes sunscreen, moisturizers, and cosmetics  Hair products should also be oil-free  · Regularly wash your hair  to decrease oil  Oily hair that touches your face can increase acne  · Avoid touching your face  as much as possible  Do not pick, squeeze, or pop your pimples  This can make your acne worse because your hands contain oil  It can also cause scars to form on your face  · Avoid things that rub against your skin  as much as possible  This includes hats, helmets, and backpacks  Follow up with your healthcare provider as directed:  Write down your questions so you remember to ask them during your visits  © 2017 [facility] Information is for End User's use only and may not be sold, redistributed or otherwise used for commercial purposes  All illustrations and images included in CareNotes® are the copyrighted property of A D A M , Inc  or [employer]  The above information is an  only  It is not intended as medical advice for individual conditions or treatments  Talk to your doctor, nurse or pharmacist before following any medical regimen to see if it is safe and effective for you  ÍÈ ÇáÔÈÇÈ   ãÇ íÌÈ Úáíß ãÚÑÝÊå:   ãÇ åæ ÍÈ ÇáÔÈÇÈ¿  Åä ÍÈ ÇáÔÈÇÈ åæ ÍÇáÉ ãÑÖíÉ DUNPB ÔÇÆÚÉ ÚäÏ ÇáãÑÇåÞíä  æÊÊÍÓä ÇáÍÇáÉ ÚÇÏÉð ãÚ ãÑæÑ ÇáæÞÊ¡ æÊÎÊÝí ÚÇÏÉð ÚäÏ Óä 25  æíãßä Ãä ÊÓÊãÑ ÎáÇá ãÑÍáÉ ÇáÈáæÛ ÚäÏ ÈÚÖ ÇáÃÔÎÇÕ     ãÇ ÇáÐí íÓÈÈ ÇáÅÕÇÈÉ ÈÍÈ ÇáÔÈÇÈ Ãæ íÒíÏ ãä ÝÑÕÉ ÇáÅÕÇÈÉ Èå¿  íÍÏË ÍÈ ÇáÔÈÇÈ ÚäÏãÇ ÊäÓÏ ÇáãÓÇã ÈÎáÇíÇ ÇáÌáÏ ÇáãíÊÉ¡ Ãæ ÇáÒíæÊ¡ Ãæ ÇáÈßÊíÑíÇ  [address] ãæÌæÏÉ Úáì ÈÔÑÊß æÇáÊí íÎÑÌ ãäåÇ ÇáÒíæÊ æÇáÚÑÞ æÇáÔÚÑ  æÞÏ íÙåÑ ÍÈ ÇáÔÈÇÈ ÈÓÈÈ ÇÑÊÝÇÚ ãÓÊæì PYMINBHVC Ýí Óä ÇáÈáæÛ  æÞÏ ÊÊÓÈÈ ÇáÊÛíÑÇÊ ÇáåÑãæäíÉ ÇáäÇÊÌÉ Úä ÊäÇæá ÍÈæÈ ãäÚ BKEWB¡ Ãæ ÇáÏæÑÉ ÇáÔåÑíÉ¡ Ãæ ÇáÍãá Ýí ÙåæÑ ÍÈ ÇáÔÈÇÈ áÏì ÇáÅäÇË  æíãßä ÃíÖðÇ Ãä ÊÊÓÈÈ ÃÏæíÉ ãËá ãÖÇÏÇÊ ÇáÇßÊÆÇÈ æÇáÃÏæíÉ ÇáãÖÇÏÉ ááäæÈÇÊ Ýí ÙåæÑ ÍÈ ÇáÔÈÇÈ  æÊÒíÏ ÇáÈÔÑÉ ÇáÍÓÇÓÉ Ãæ æÌæÏ ÊÇÑíÎ LWHTCIY ááÅÕÇÈÉ ÈåÐÇ ÇáãÑÖ ãä ÎØÑ LEVYBVYG ãäå  æíãßä Ãä íÄÏí ãÇ íáí Åáì ÊÝÇÞã GTGFERZ ãä ÍÈ ÇáÔÈÇÈ:  · [id number] ÇáÊÌãíá Ãæ ãäÊÌÇÊ ÇáÔÚÑ ÇáÊí ÊÚÊãÏ ÊÑßíÈÊåÇ Úáì ÇáÒíæÊ¡ Ãæ ÒíÊ ÇáÍãÇíÉ ãä ÇáÔãÓ    · Íß ÇáÈÔÑÉ ÈÚäÝ Ãæ ËÞÈ ÇáÈËæÑ    · ÇáÅÌåÇÏ    · ÇáÖÛØ Úáì ÇáÈÔÑÉ ÇáäÇÊÌ Úä ÇáÎæÐÇÊ ÇáÑíÇÖíÉ Ãæ ÍÞÇÆÈ ÇáÙåÑ  ãÇ åí ÇáÃäæÇÚ [id number] áÍÈ ÇáÔÈÇÈ¿  ÚÇÏÉ ãÇ íÙåÑ ÍÈ ÇáÔÈÇÈ Úáì ÇáæÌå æÇáÚäÞ æÝí ÇáÌÒÁ ÇáÚáæí ãä ÇáÕÏÑ æÚáì ÇáÙåÑ æÝí ÃÚáì ÇáÐÑÇÚíä  · ÇáÑÄæÓ ÇáÈíÖÇÁ  ÚÈÇÑÉ Úä äÊæÁÇÊ ÈíÖÇÁ ãÛáÞÉ ÊÊÔßá ÚäÏãÇ ÊäÓÏ ÇáãÓÇã ÈÇáßÇãá  · ÇáÑÄæÓ ÇáÓæÏÇÁ  ÚÈÇÑÉ Úä äÞÇØ ÕÛíÑÉ ÏÇßäÉ ÊÊÔßá ÚäÏãÇ ÊäÓÏ ÇáãÓÇã æáßäåÇ ÊÙá ãÝÊæÍÉ  · ÇáÈËæÑ  ÚÈÇÑÉ Úä äÊæÁÇÊ ÊÍÊæí Úáì ÞíÍ  VORYCIR ãÇ ÊÙåÑ ÈÓÈÈ ÇäÓÏÇÏ ÇáãÓÇã  æÊÙåÑ ÇáÈËæÑ ÚäÏ ÇáÅÕÇÈÉ ÈÚÏæì Ýí ÇáÑÄæÓ ÇáÈíÖÇÁ Ãæ ÇáÑÄæÓ ÇáÓæÏÇÁ  · Åä ÍÈ ÇáÔÈÇÈ ÇáßíÓí  ÚÈÇÑÉ Úä ÊßÊáÇÊ Ãæ ÍæíÕáÇÊ ßÈíÑÉ æãáÊåÈÉ ÊÍÊæí Úáì ÞíÍ  æíÈÏæ Ðáß æßÃäå ÈËæÑ ßÈíÑÉ ÇáÍÌã æíÊßæä Úáì ãÓÊæì ÚãíÞ ÏÇÎá ÇáÈÔÑÉ  æÞÏ íÓÈÈ Ðáß ÇáÔÚæÑ IFQUJO Ãæ íÊÑß äÏÈÇÊ  ßíÝ íÊã ÊÔÎíÕ ÇáÅÕÇÈÉ ÈÍÈ ÇáÔÈÇÈ¿  ÓíÞæã ãÞÏã ÇáÑÚÇíÉ ÇáÕÍíÉ ÈÝÍÕ ÈÔÑÊß æíÓÃáß ÚãÇ ÅÐÇ ßäÊ ÎÖÚÊ áÚáÇÌ áÍÈ ÇáÔÈÇÈ ãä ÞÈá Ãã áÇ  ÃÎÈÑå ÈÝÊÑÉ ÏæÇã ÍÈ ÇáÔÈÇÈ áÏíß æäæÚ ãäÊÌÇÊ ÑÚÇíÉ ÇáæÌå ÇáÊí ÊÓÊÎÏãåÇ  æÞÏ íÓÃáß ãÞÏã ÇáÑÚÇíÉ ÇáÕÍíÉ ÇáÐí íÊÇÈÚ ÍÇáÊß ÚãÇ ÅÐÇ ßÇä åäÇß ÔíÁ íÌÚá ÍÈ ÇáÔÈÇÈ ÇáÐí ÊÚÇäí ãäå íÊÍÓä Ãæ íÓæÁ  ÞÏ ÊÍÊÇÌ áÅÌÑÇÁ ÇÎÊÈÇÑÇÊ Ïã áÝÍÕ ãÓÊæíÇÊ [id number]  ßíÝ íÊã ÚáÇÌ ÍÈ ÇáÔÈÇÈ¿  íÚÊãÏ ÇáÚáÇÌ Úáì ãÏì ÍÏÉ ÙåæÑ ÍÈ ÇáÔÈÇÈ áÏíß   ÞÏ íæÕì ãÞÏã ÇáÑÚÇíÉ ÇáÕÍíÉ ÇáãÓÄæá Úä ãÊÇÈÚÊß ÈÃí ãä ÇáÂÊí:  · ÃÏæíÉ ÍÈ ÇáÔÈÇÈ ÇáãÊÇÍÉ ãä Ïæä æÕÝÉ  æÇáãÍÊæíÉ Úáì ÈíÑæßÓíÏ ÇáÈäÒæíá æÍãÖ ÇáÓÇáíÓíáíß ÞÏ ÊÓÇÚÏ Úáì ÚáÇÌ [id number] ÇáÎÝíÝÉ ãä ÍÈ ÇáÔÈÇÈ  ÅäåÇ ãÊÇÍÉ Úáì åíÆÉ Ìá¡ Ãæ ÛÓæá¡ Ãæ ßÑíã¡ Ãæ áÕÞÇÊ¡ Ãæ ÞØÚ ÕÇÈæä  æÞÏ íÓÊÛÑÞ ÇáÃãÑ ÚÏÉ ÃÓÇÈíÚ áÙåæÑ ÊÍÓä áÏíß  ÇÊÈÚ [id number] LMBNMOP ÇáØÈíÉ ÇáÎÇÕÉ ÈÇáÏæÇÁ  áÇ ÊÓÊÎÏã ÃßËÑ ãä QVANDYH  ÞÏ íÓÈÈ åÐÇ ÇáÏæÇÁ ÌÝÇÝ ÇáÈÔÑÉ Ãæ ÇÍãÑÇÑåÇ ÅÐÇ ÇÓÊÎÏãÊ ßãíÉ ÃßËÑ ãä ÇááÇÒã  · ÇáÃÏæíÉ ÇáãæÕæÝÉ ØÈíðÇ  ÞÏ Êßæä ÖÑæÑíÉ ÅÐÇ áã ÊÍÞÞ ÇáÃÏæíÉ ÇáãÊÇÍÉ ãä Ïæä æÕÝÉ ØÈíÉ äÝÚðÇ ÈÚÏ ãÑæÑ ÔåÑíä  ÞÏ ÊÍÊÇÌ áÊäÇæá ÃßËÑ ãä äæÚ æÇÍÏ ãä ÇáÃÏæíÉ áÚáÇÌ ÍÈ ÇáÔÈÇÈ ÇáÐí ÊÚÇäí ãäå  æÞÏ íÔãá Ðáß ÇáãÖÇÏÇÊ ÇáÍíæíÉ ÇáÊí ÊÞÊá ÇáÈßÊÑíÇ æÊÓÇÚÏ Úáì ÊÞáíá ÇáÊæÑã  ÞÏ íÊÓÈÈ äæÚ ãä ÇáÃÏæíÉ ÇáãæÕæÝÉ ØÈíðÇ áÚáÇÌ ÍÈ ÇáÔÈÇÈ íõÓãì ÇáÃÏæíÉ ÇáÑíÊíäÇáíÉ Ýí ÍÏæË ÚíæÈ ÎáÞíÉ ÎØíÑÉ  áÇ  ÊÓÊÎÏãí åÐÇ ÇáÏæÇÁ ÅÐÇ ßäÊö ÍÇãáÇð Ãæ ÞÏ ÊÕÈÍíä ÍÇãáÇð  · ÇáÚáÇÌ ÈÇáÖæÁ  ÞÏ íÓÇÚÏ Úáì ÊÞáíá ÍÈ ÇáÔÈÇÈ áÏíß  ÇØáÈ ãä ãÞÏã ÇáÑÚÇíÉ ÇáÕÍíÉ ÇáÎÇÕ Èß ÇáãÒíÏ ãä [id number] Úä [id number] ÈÇáÖæÁ  ãÇ ÇáÐí íãßääí ÝÚáå ÈÎáÇÝ ãÇ Êã ÐßÑå MDUZVX Ýí ÍÈ ÇáÔÈÇÈ Ãæ ÇáæÞÇíÉ ãäå¿   · ÇÛÓá æÌåß ãÑÊíä Ýí Çáíæã  PMEDPJSI ãäÙÝ áØíÝ Úáì ÇáÈÔÑÉ  ÝÞÏ íÓÇÚÏ Ðáß Úáì ÊÞáíá ÊÑÇßã ÇáÒíæÊ ÇáÊí ÊÄÏí Åáì ÙåæÑ ÍÈ ÇáÔÈÇÈ  æÇÍÑÕ ÃíÖðÇ Úáì ÛÓá æÌåß ÅÐÇ ßäÊ ÊÚÑÞ ßËíÑðÇ¡ ãËáãÇ íÍÏË ÈÚÏ ããÇÑÓÉ ÇáÊãÑíäÇÊ ÇáÑíÇÖíÉ  · ÇÓÊÎÏã ãäÊÌÇÊ ÎÇáíÉ ãä ÇáÒíæÊ  æíÔãá Ðáß ãÓÊÍÖÑÇÊ ÇáæÞÇíÉ ãä ÇáÔãÓ¡ [id number]¡ æãÓÊÍÖÑÇÊ ÇáÊÌãíá  æíÌÈ Ãä ÊÎáæ ÃíÖðÇ ÇáãäÊÌÇÊ ÇáãÓÊÎÏãÉ ááÔÚÑ ãä ÇáÒíæÊ  · ÇÛÓá ÔÚÑß ÈÇäÊÙÇã  áÊÞáíá ÇáÒíæÊ  ÝÞÏ íÒíÏ ÇáÔÚÑ ÇáÐí íáÇãÓ æÌåß ãä ÍÈ ÇáÔÈÇÈ  · ÊÌäÈ áãÓ æÌåß  ÞÏÑ ÇáÅãßÇä  áÇ ÊÞã ÈËÞÈ ÇáÈËæÑ Ãæ ÇáÖÛØ ÚáíåÇ Ãæ ÝÞÃåÇ  ÝÞÏ íÄÏí Ðáß Åáì ÊÝÇÞã ãÚÇäÇÊß ãä ÍÈ ÇáÔÈÇÈ äÙÑðÇ áæÌæÏ ÒíæÊ ÈíÏíß  æÞÏ íÓÈÈ Ðáß ÃíÖðÇ Êßæøä äÏÈÇÊ Úáì æÌåß  · ÊÌäÈ ÇáÃÔíÇÁ ÇáÊí ÊÍß ÈÔÑÊß  ÞÏÑ ÇáÅãßÇä  æíÔãá Ðáß ÇáÞÈÚÇÊ¡ æÇáÎæÐÇÊ¡ æÍÞÇÆÈ ÇáÙåÑ  ãÊì íäÈÛí Úáí ÇáÇÊÕÇá ÈãÞÏã ÇáÑÚÇíÉ ÇáÕÍíÉ¿   · ßäÊ ÊÓÊÎÏãíä ÃÏæíÉ ÑíÊíäÇáíÉ æÊÚÊÞÏíä Ãäß ÞÏ ÊÍãáíä  · ßäÊ ÊÓÊÎÏã ÃÍÏ ÇáÃÏæíÉ ÇáÑíÊíäÇáíÉ æÈÏÃÊ Ýí ÇáÔÚæÑ ÈÊÞáÈÇÊ Ýí ÇáãÒÇÌ Ãæ ÊÛíÑÇÊ Ýí ÇáÔÎÕíÉ  · ÇáÔÚæÑ ÈÇáÇßÊÆÇÈ  · ÅÐÇ ÚÇäíÊ ãä Íãì æÇáÊåÇÈ ÈÌáÏß  · áÇ íÒæá ÍÈ ÇáÔÈÇÈ¡ ÍÊì ÈÚÏ ÇáÚáÇÌ  · Kirsten Doyne BTHHYHXOL Ãæ ãÎÇæÝ XTFJLS ÈÍÇáÊß Ãæ IHHHIPTO ÇáÊí ÊÊáÞÇåÇ  ÇÊÝÇÞíÉ ÇáÑÚÇíÉ:   ãä ÍÞß Ãä ÊÓÇÚÏ Ýí ÇáÊÎØíØ ááÑÚÇíÉ ÇáÎÇÕÉ Èß  ÊÚÑÝ Úáì ãÔßáÊß ÇáÕÍíÉ æßíÝ íãßä ÚáÇÌåÇ  äÇÞÔ ÎíÇÑÇÊ ÇáÚáÇÌ ãÚ ãÞÏãí ÇáÑÚÇíÉ áÊÍÏíÏ äæÚíÉ ÇáÑÚÇíÉ ÇáÊí ÊÍÊÇÌ Åáì ÊáÞíåÇ  æãä ÍÞß ÏÇÆãðÇ ÑÝÖ AYGWOC  The above information is an  only  It is not intended as medical advice for individual conditions or treatments  Talk to your doctor, nurse or pharmacist before following any medical regimen to see if it is safe and effective for you  © 2017 [facility] Information is for End User's use only and may not be sold, redistributed or otherwise used for commercial purposes  All illustrations and images included in CareNotes® are the copyrighted property of A D A M , Inc  or Robert Burris

## 2019-11-20 NOTE — PROGRESS NOTES
Assessment/Plan:    Type 2 diabetes mellitus without complication, without long-term current use of insulin ([facility])    Lab Results   Component Value Date    HGBA1C 5 9 11/16/2019    Despite the patient's age of 20years old, he has type 2 diabetes mellitus  His diabetes is under excellent control  His hemoglobin A1c was noted to be 5 9%  Fasting blood glucose was 119  Urine albumin to creatinine ratio was 7  He will continue metformin  I encouraged the patient to try to lose weight and to exercise  I planned repeat fasting blood work in approximately 3 months    Acne vulgaris  Patient had been using BenzaClin for his acne  His insurance no longer covers this  I explained to the patient that this medication is a combination of 2 topical agents  I will prescribe the 2 agents separately to him  I prescribed clindamycin topical solution and benzoyl peroxide 5%  He will apply each to his face twice a day       Diagnoses and all orders for this visit:    Type 2 diabetes mellitus without complication, without long-term current use of insulin ([facility] [address] )  -     Hemoglobin A1C; Future  -     Comprehensive metabolic panel; Future    Acne vulgaris  -     clindamycin (CLEOCIN T) 1 % external solution; Apply topically 2 (two) times a day  -     benzoyl peroxide 5 % gel; Apply topically 2 (two) times a day    Dyslipidemia  -     Lipid panel; Future          Subjective:      Patient ID: Nirmala Puri is a 20 y o  male  This patient is a 54-year-old white male presents to the office today for his routine checkup  He is accompanied to the office today by his father  He is nonverbal but able to communicate with me by either a nodding yes or no or typing the responses on his cell phone  His father reports that he is currently attending school for automotive collision repair  He attends [employer]  He has been taking his medications as prescribed        The following portions of the patient's history were reviewed and updated as appropriate: allergies, current medications, past family history, past medical history, past social history, past surgical history and problem list     Review of Systems   Cardiovascular: Negative for chest pain, palpitations and leg swelling  Gastrointestinal: Negative for abdominal distention, abdominal pain, blood in stool, constipation, diarrhea, nausea and vomiting  Objective:      /78 (BP Location: Left arm, Patient Position: Sitting, Cuff Size: Adult)   Pulse 101   Ht 5' 5" (1 651 m)   Wt 76 5 kg (168 lb 11 2 oz)   SpO2 97%   BMI 28 07 kg/m²          Physical Exam   Constitutional:   This patient is a 39-year-old white male appears his stated age  He is pleasant and cooperative  He was in no apparent distress   HENT:   Head: Normocephalic and atraumatic  Right Ear: External ear normal    Left Ear: External ear normal    Mouth/Throat: Oropharynx is clear and moist  No oropharyngeal exudate  Tympanic membranes are clear   Eyes: Pupils are equal, round, and reactive to light  Conjunctivae are normal  No scleral icterus  Neck: Neck supple  No tracheal deviation present  No thyromegaly present  Cardiovascular: Normal rate, regular rhythm and normal heart sounds  Exam reveals no gallop and no friction rub  No murmur heard  Pulmonary/Chest: Effort normal and breath sounds normal  No stridor  No respiratory distress  He has no wheezes  He has no rales  Abdominal: Soft  Bowel sounds are normal  He exhibits no distension and no mass  There is no tenderness  There is no rebound and no guarding  There is no organomegaly   Lymphadenopathy:     He has no cervical adenopathy  Vitals reviewed      extremities:  Without cyanosis, clubbing, or edema

## 2019-11-21 NOTE — ASSESSMENT & PLAN NOTE
Patient had been using BenzaClin for his acne  His insurance no longer covers this  I explained to the patient that this medication is a combination of 2 topical agents  I will prescribe the 2 agents separately to him  I prescribed clindamycin topical solution and benzoyl peroxide 5%    He will apply each to his face twice a day

## 2019-11-21 NOTE — ASSESSMENT & PLAN NOTE
Lab Results   Component Value Date    HGBA1C 5 9 11/16/2019    Despite the patient's age of 20years old, he has type 2 diabetes mellitus  His diabetes is under excellent control  His hemoglobin A1c was noted to be 5 9%  Fasting blood glucose was 119  Urine albumin to creatinine ratio was 7  He will continue metformin  I encouraged the patient to try to lose weight and to exercise    I planned repeat fasting blood work in approximately 3 months

## 2020-02-23 ENCOUNTER — APPOINTMENT (OUTPATIENT)
Dept: LAB | Facility: HOSPITAL | Age: 20
End: 2020-02-23
Attending: FAMILY MEDICINE
Payer: COMMERCIAL

## 2020-02-23 DIAGNOSIS — E11.9 TYPE 2 DIABETES MELLITUS WITHOUT COMPLICATION, WITHOUT LONG-TERM CURRENT USE OF INSULIN (HCC): ICD-10-CM

## 2020-02-23 DIAGNOSIS — E78.5 DYSLIPIDEMIA: ICD-10-CM

## 2020-02-23 LAB
ALBUMIN SERPL BCP-MCNC: 4 G/DL (ref 3.5–5)
ALP SERPL-CCNC: 56 U/L (ref 46–116)
ALT SERPL W P-5'-P-CCNC: 28 U/L (ref 12–78)
ANION GAP SERPL CALCULATED.3IONS-SCNC: 10 MMOL/L (ref 4–13)
AST SERPL W P-5'-P-CCNC: 20 U/L (ref 5–45)
BILIRUB SERPL-MCNC: 0.4 MG/DL (ref 0.2–1)
BUN SERPL-MCNC: 6 MG/DL (ref 5–25)
CALCIUM SERPL-MCNC: 9.2 MG/DL (ref 8.3–10.1)
CHLORIDE SERPL-SCNC: 103 MMOL/L (ref 100–108)
CHOLEST SERPL-MCNC: 137 MG/DL (ref 50–200)
CO2 SERPL-SCNC: 27 MMOL/L (ref 21–32)
CREAT SERPL-MCNC: 0.61 MG/DL (ref 0.6–1.3)
EST. AVERAGE GLUCOSE BLD GHB EST-MCNC: 140 MG/DL
GFR SERPL CREATININE-BSD FRML MDRD: 144 ML/MIN/1.73SQ M
GLUCOSE P FAST SERPL-MCNC: 117 MG/DL (ref 65–99)
HBA1C MFR BLD: 6.5 %
HDLC SERPL-MCNC: 46 MG/DL
LDLC SERPL CALC-MCNC: 82 MG/DL (ref 0–100)
NONHDLC SERPL-MCNC: 91 MG/DL
POTASSIUM SERPL-SCNC: 4.4 MMOL/L (ref 3.5–5.3)
PROT SERPL-MCNC: 7.5 G/DL (ref 6.4–8.2)
SODIUM SERPL-SCNC: 140 MMOL/L (ref 136–145)
TRIGL SERPL-MCNC: 45 MG/DL

## 2020-02-23 PROCEDURE — 80061 LIPID PANEL: CPT

## 2020-02-23 PROCEDURE — 83036 HEMOGLOBIN GLYCOSYLATED A1C: CPT

## 2020-02-23 PROCEDURE — 80053 COMPREHEN METABOLIC PANEL: CPT

## 2020-02-23 PROCEDURE — 36415 COLL VENOUS BLD VENIPUNCTURE: CPT

## 2020-02-26 ENCOUNTER — OFFICE VISIT (OUTPATIENT)
Dept: FAMILY MEDICINE CLINIC | Facility: CLINIC | Age: 20
End: 2020-02-26
Payer: COMMERCIAL

## 2020-02-26 VITALS
HEART RATE: 110 BPM | TEMPERATURE: 97.8 F | DIASTOLIC BLOOD PRESSURE: 94 MMHG | SYSTOLIC BLOOD PRESSURE: 148 MMHG | BODY MASS INDEX: 29.66 KG/M2 | WEIGHT: 178 LBS | HEIGHT: 65 IN | OXYGEN SATURATION: 98 %

## 2020-02-26 DIAGNOSIS — Z00.01 ENCOUNTER FOR GENERAL ADULT MEDICAL EXAMINATION WITH ABNORMAL FINDINGS: Primary | ICD-10-CM

## 2020-02-26 DIAGNOSIS — R00.0 TACHYCARDIA: ICD-10-CM

## 2020-02-26 DIAGNOSIS — E11.9 TYPE 2 DIABETES MELLITUS WITHOUT COMPLICATION, WITHOUT LONG-TERM CURRENT USE OF INSULIN (HCC): ICD-10-CM

## 2020-02-26 DIAGNOSIS — L70.0 ACNE VULGARIS: ICD-10-CM

## 2020-02-26 PROCEDURE — 3044F HG A1C LEVEL LT 7.0%: CPT | Performed by: FAMILY MEDICINE

## 2020-02-26 PROCEDURE — 93000 ELECTROCARDIOGRAM COMPLETE: CPT | Performed by: FAMILY MEDICINE

## 2020-02-26 PROCEDURE — 99395 PREV VISIT EST AGE 18-39: CPT | Performed by: FAMILY MEDICINE

## 2020-02-26 RX ORDER — CLINDAMYCIN PHOSPHATE 11.9 MG/ML
SOLUTION TOPICAL 2 TIMES DAILY
Qty: 60 ML | Refills: 5 | Status: SHIPPED | OUTPATIENT
Start: 2020-02-26 | End: 2021-03-11 | Stop reason: SDUPTHER

## 2020-02-26 NOTE — PROGRESS NOTES
Assessment/Plan:    Type 2 diabetes mellitus without complication, without long-term current use of insulin (HCC)    Lab Results   Component Value Date    HGBA1C 6 5 (H) 02/23/2020    Patient has type 2 diabetes mellitus  Fasting blood sugar was 117  Hemoglobin A1c is 6 5%  The patient is scheduled for a dilated eye exam at the end of May  We discussed diabetic foot care  I encouraged the patient to continue metformin and to watch his diet and try to exercise  Tachycardia  Patient's blood pressure was elevated today  EKG showed a sinus tachycardia with nonspecific ST changes  I am going to get a CBC and thyroid function testing and see the patient back again in a couple of weeks  I will recheck his blood pressure and pulse at that time and determine if treatment is indicated  Acne vulgaris  I refilled the patient's Cleocin-T solution for his acne       Diagnoses and all orders for this visit:    Encounter for general adult medical examination with abnormal findings    Tachycardia  -     TSH, 3rd generation with Free T4 reflex; Future  -     CBC and differential; Future    Acne vulgaris  -     clindamycin (CLEOCIN T) 1 % external solution; Apply topically 2 (two) times a day    Type 2 diabetes mellitus without complication, without long-term current use of insulin ([facility])          Subjective:      Patient ID: Lexine Lombard is a 20 y o  male  This patient is a 57-year-old white male who presents to the office today with his mother and father  Patient is here today for his annual physical   He has known history of type 2 diabetes mellitus, despite being only 20years old  He is mute  He had blood work done in anticipation of the visit  His mother and father were worried because his blood pressure was elevated today as well as his pulse  He has not been taking any over-the-counter cold or sinus medication  He does not drink energy or monster drinks  He denies pain  He denies fever    He denies nausea or vomiting  He denies black or tarry bowel movements or blood in the stools  The following portions of the patient's history were reviewed and updated as appropriate: allergies, current medications, past family history, past medical history, past social history, past surgical history and problem list     Review of Systems   Constitutional: Negative for activity change, appetite change and unexpected weight change  Cardiovascular: Negative for chest pain, palpitations and leg swelling  Gastrointestinal: Negative for abdominal distention, abdominal pain, anal bleeding, constipation, diarrhea, nausea and vomiting  Endocrine: Negative for polydipsia, polyphagia and polyuria  Objective:      /94 (BP Location: Right arm, Patient Position: Sitting, Cuff Size: Adult)   Pulse (!) 110   Temp 97 8 °F (36 6 °C)   Ht 5' 5" (1 651 m)   Wt 80 7 kg (178 lb)   SpO2 98%   BMI 29 62 kg/m²          Physical Exam   Constitutional: He appears well-developed and well-nourished  No distress  HENT:   Head: Normocephalic and atraumatic  Right Ear: External ear normal    Left Ear: External ear normal    Mouth/Throat: Oropharynx is clear and moist  No oropharyngeal exudate  Tympanic membranes were clear   Eyes: Pupils are equal, round, and reactive to light  Conjunctivae are normal  No scleral icterus  Neck: Neck supple  No tracheal deviation present  No thyromegaly present  No thyroid tenderness  Cardiovascular: Normal rate, regular rhythm and normal heart sounds  Exam reveals no gallop and no friction rub  No murmur heard  Pulmonary/Chest: Effort normal and breath sounds normal  No stridor  No respiratory distress  He has no wheezes  He has no rales  Abdominal: Soft  Bowel sounds are normal  He exhibits no distension and no mass  There is no tenderness  There is no guarding  No organomegaly is noted   Lymphadenopathy:     He has no cervical adenopathy  Vitals reviewed  extremities:  Without cyanosis, clubbing, or edema

## 2020-02-27 NOTE — ASSESSMENT & PLAN NOTE
Lab Results   Component Value Date    HGBA1C 6 5 (H) 02/23/2020    Patient has type 2 diabetes mellitus  Fasting blood sugar was 117  Hemoglobin A1c is 6 5%  The patient is scheduled for a dilated eye exam at the end of May  We discussed diabetic foot care  I encouraged the patient to continue metformin and to watch his diet and try to exercise

## 2020-02-27 NOTE — ASSESSMENT & PLAN NOTE
Patient's blood pressure was elevated today  EKG showed a sinus tachycardia with nonspecific ST changes  I am going to get a CBC and thyroid function testing and see the patient back again in a couple of weeks  I will recheck his blood pressure and pulse at that time and determine if treatment is indicated

## 2020-02-29 ENCOUNTER — APPOINTMENT (OUTPATIENT)
Dept: LAB | Facility: HOSPITAL | Age: 20
End: 2020-02-29
Attending: FAMILY MEDICINE
Payer: COMMERCIAL

## 2020-02-29 DIAGNOSIS — R00.0 TACHYCARDIA: ICD-10-CM

## 2020-02-29 LAB
BASOPHILS # BLD AUTO: 0.04 THOUSANDS/ΜL (ref 0–0.1)
BASOPHILS NFR BLD AUTO: 1 % (ref 0–1)
EOSINOPHIL # BLD AUTO: 0.08 THOUSAND/ΜL (ref 0–0.61)
EOSINOPHIL NFR BLD AUTO: 1 % (ref 0–6)
ERYTHROCYTE [DISTWIDTH] IN BLOOD BY AUTOMATED COUNT: 12.5 % (ref 11.6–15.1)
HCT VFR BLD AUTO: 44 % (ref 36.5–49.3)
HGB BLD-MCNC: 14.7 G/DL (ref 12–17)
IMM GRANULOCYTES # BLD AUTO: 0.01 THOUSAND/UL (ref 0–0.2)
IMM GRANULOCYTES NFR BLD AUTO: 0 % (ref 0–2)
LYMPHOCYTES # BLD AUTO: 2.21 THOUSANDS/ΜL (ref 0.6–4.47)
LYMPHOCYTES NFR BLD AUTO: 36 % (ref 14–44)
MCH RBC QN AUTO: 29.9 PG (ref 26.8–34.3)
MCHC RBC AUTO-ENTMCNC: 33.4 G/DL (ref 31.4–37.4)
MCV RBC AUTO: 89 FL (ref 82–98)
MONOCYTES # BLD AUTO: 0.45 THOUSAND/ΜL (ref 0.17–1.22)
MONOCYTES NFR BLD AUTO: 7 % (ref 4–12)
NEUTROPHILS # BLD AUTO: 3.4 THOUSANDS/ΜL (ref 1.85–7.62)
NEUTS SEG NFR BLD AUTO: 55 % (ref 43–75)
NRBC BLD AUTO-RTO: 0 /100 WBCS
PLATELET # BLD AUTO: 239 THOUSANDS/UL (ref 149–390)
PMV BLD AUTO: 9.8 FL (ref 8.9–12.7)
RBC # BLD AUTO: 4.92 MILLION/UL (ref 3.88–5.62)
TSH SERPL DL<=0.05 MIU/L-ACNC: 1.26 UIU/ML (ref 0.46–3.98)
WBC # BLD AUTO: 6.19 THOUSAND/UL (ref 4.31–10.16)

## 2020-02-29 PROCEDURE — 85025 COMPLETE CBC W/AUTO DIFF WBC: CPT

## 2020-02-29 PROCEDURE — 36415 COLL VENOUS BLD VENIPUNCTURE: CPT

## 2020-02-29 PROCEDURE — 84443 ASSAY THYROID STIM HORMONE: CPT

## 2020-03-05 DIAGNOSIS — E11.9 TYPE 2 DIABETES MELLITUS WITHOUT COMPLICATION, WITHOUT LONG-TERM CURRENT USE OF INSULIN (HCC): ICD-10-CM

## 2020-03-12 DIAGNOSIS — L70.0 ACNE VULGARIS: ICD-10-CM

## 2020-03-12 RX ORDER — DOXYCYCLINE HYCLATE 100 MG/1
CAPSULE ORAL
Qty: 30 CAPSULE | Refills: 5 | Status: SHIPPED | OUTPATIENT
Start: 2020-03-12 | End: 2020-12-02

## 2020-03-16 ENCOUNTER — OFFICE VISIT (OUTPATIENT)
Dept: FAMILY MEDICINE CLINIC | Facility: CLINIC | Age: 20
End: 2020-03-16
Payer: COMMERCIAL

## 2020-03-16 VITALS
DIASTOLIC BLOOD PRESSURE: 78 MMHG | TEMPERATURE: 97 F | HEART RATE: 93 BPM | BODY MASS INDEX: 28.46 KG/M2 | WEIGHT: 170.8 LBS | HEIGHT: 65 IN | SYSTOLIC BLOOD PRESSURE: 128 MMHG | OXYGEN SATURATION: 99 %

## 2020-03-16 DIAGNOSIS — R00.0 TACHYCARDIA: ICD-10-CM

## 2020-03-16 DIAGNOSIS — E11.9 TYPE 2 DIABETES MELLITUS WITHOUT COMPLICATION, WITHOUT LONG-TERM CURRENT USE OF INSULIN (HCC): Primary | ICD-10-CM

## 2020-03-16 DIAGNOSIS — L70.0 ACNE VULGARIS: ICD-10-CM

## 2020-03-16 PROCEDURE — 99213 OFFICE O/P EST LOW 20 MIN: CPT | Performed by: FAMILY MEDICINE

## 2020-03-16 PROCEDURE — 3008F BODY MASS INDEX DOCD: CPT | Performed by: FAMILY MEDICINE

## 2020-03-16 PROCEDURE — 1036F TOBACCO NON-USER: CPT | Performed by: FAMILY MEDICINE

## 2020-03-16 PROCEDURE — 3044F HG A1C LEVEL LT 7.0%: CPT | Performed by: FAMILY MEDICINE

## 2020-03-16 NOTE — PATIENT INSTRUCTIONS
Low-Sodium Diet   AMBULATORY CARE:   A low-sodium diet  limits foods that are high in sodium (salt)  You will need to follow a low-sodium diet if you have high blood pressure, kidney disease, or heart failure  You may also need to follow this diet if you have a condition that is causing your body to retain (hold) extra fluid  You may need to limit the amount of sodium you eat to 1,500 mg  Ask your healthcare provider how much sodium you can have each day  How to use food labels to choose foods that are low in sodium:  Read food labels to find the amount of sodium they contain  The amount of sodium is listed in milligrams (mg)  The % Daily Value (DV) column tells you how much of your daily needs are met by 1 serving of the food for each nutrient listed  Choose foods that have less than 5% of the DV of sodium  These foods are considered low in sodium  Foods that have 20% or more of the DV of sodium are considered high in sodium  Some food labels may also list any of the following terms that tell you about the sodium content in the food:  · Sodium-free:  Less than 5 mg in each serving    · Very low sodium:  35 mg of sodium or less in each serving    · Low sodium:  140 mg of sodium or less in each serving    · Reduced sodium: At least 25% less sodium in each serving than the regular type    · Light in sodium:  50% less sodium in each serving    · Unsalted or no added salt:  No extra salt is added during processing (the food may still contain sodium)  Foods to avoid:  Salty foods are high in sodium   You should avoid the following:  · Processed foods:      ¨ Mixes for cornbread, biscuits, cake, and pudding     ¨ Instant foods, such as potatoes, cereals, noodles, and rice     ¨ Packaged foods, such as bread stuffing, rice and pasta mixes, snack dip mixes, and macaroni and cheese     ¨ Canned foods, such as canned vegetables, soups, broths, sauces, and vegetable or tomato juice    ¨ Snack foods, such as salted chips, popcorn, pretzels, pork rinds, salted crackers, and salted nuts    ¨ Frozen foods, such as dinners, entrees, vegetables with sauces, and breaded meats    ¨ Sauerkraut, pickled vegetables, and other foods prepared in brine    · Meats and cheeses:      ¨ Smoked or cured meat, such as corned beef, [place], ham, hot dogs, and sausage    ¨ Canned meats or spreads, such as potted meats, sardines, anchovies, and imitation seafood    ¨ Deli or lunch meats, such as bologna, ham, turkey, and roast beef    ¨ Processed cheese, such as American cheese and cheese spreads    · Condiments, sauces, and seasonings:      ¨ Salt (¼ teaspoon of salt contains 575 mg of sodium)    ¨ Seasonings made with salt, such as garlic salt, celery salt, onion salt, and seasoned salt    ¨ Regular soy sauce, barbecue sauce, teriyaki sauce, steak sauce, Worcestershire sauce, and most flavored vinegars    ¨ Canned gravy and mixes     ¨ Regular condiments, such as mustard, ketchup, and salad dressings    ¨ Pickles and olives    ¨ Meat tenderizers and monosodium glutamate (MSG)  Foods to include:  Read the food label to find the exact amount of sodium in each serving  · Bread and cereal:  Try to choose breads with less than 80 mg of sodium per serving  ¨ Bread, roll, lorie, tortilla, or unsalted crackers  ¨ Ready-to-eat cereals with less than 5% DV of sodium (examples include shredded wheat and puffed rice)    ¨ Pasta    · Vegetables and fruits:      ¨ Unsalted fresh, frozen, or canned vegetables    ¨ Fresh, frozen, or canned fruits    ¨ Fruit juice    · Dairy:  One serving has about 150 mg of sodium  ¨ Milk, all types    ¨ Yogurt    ¨ Hard cheese, such as cheddar, Swiss, [employer], or mozzarella    · Meat and other protein foods:  Some raw meats may have added sodium       ¨ Plain meats, fish, and poultry     ¨ Eggs    · Other foods:      ¨ Homemade pudding    ¨ Unsalted nuts, popcorn, or pretzels    ¨ Unsalted butter or margarine  Ways to decrease sodium:   · Add spices and herbs to foods instead of salt during cooking  Use salt-free seasonings to add flavor to foods  Examples include onion powder, garlic powder, basil, zarate powder, paprika, and parsley  Try lemon or lime juice or vinegar to give foods a tart flavor  Use hot peppers, pepper, or cayenne pepper to add a spicy flavor to foods  · Do not keep a salt shaker at your kitchen table  This may help keep you from adding salt to food at the table  It may take time to get used to enjoying the natural flavor of food instead of adding salt  Talk to your healthcare provider before you use salt substitutes  Some salt substitutes have a high amount of potassium and need to be avoided if you have kidney disease  · Choose low-sodium foods at restaurants  Meals from restaurants are often high in sodium  Some restaurants have nutrition information on the menu that tells you the amount of sodium in their foods  If possible, ask for your food to be prepared with less, or no salt  · Shop for unsalted or low-sodium foods and snacks at the grocery store  Examples include unsalted or low-sodium broths, soups, and canned vegetables  Choose fresh or frozen vegetables instead  Choose unsalted nuts or seeds or fresh fruits or vegetables as snacks  Read food labels and choose salt-free, very low-sodium, or low-sodium foods  © 2017 [facility] Information is for End User's use only and may not be sold, redistributed or otherwise used for commercial purposes  All illustrations and images included in CareNotes® are the copyrighted property of [employer]  or [employer]  The above information is an  only  It is not intended as medical advice for individual conditions or treatments  Talk to your doctor, nurse or pharmacist before following any medical regimen to see if it is safe and effective for you

## 2020-03-16 NOTE — ASSESSMENT & PLAN NOTE
Patient's pulse today was noted to be 88  I recheck his blood pressure myself and found to be 126/80  He does not need blood pressure medication at this time  Will continue to follow  I advised him to follow a low-sodium diet  I have also asked him to try to lose weight and exercise  I will see him back again in early July  I planned repeat fasting blood work prior to that visit

## 2020-03-16 NOTE — PROGRESS NOTES
Assessment/Plan:    Tachycardia  Patient's pulse today was noted to be 88  I recheck his blood pressure myself and found to be 126/80  He does not need blood pressure medication at this time  Will continue to follow  I advised him to follow a low-sodium diet  I have also asked him to try to lose weight and exercise  I will see him back again in early July  I planned repeat fasting blood work prior to that visit  Diagnoses and all orders for this visit:    Type 2 diabetes mellitus without complication, without long-term current use of insulin (HCC)  -     Basic metabolic panel; Future  -     Hemoglobin A1C; Future    Acne vulgaris  -     benzoyl peroxide 5 % gel; Apply topically 2 (two) times a day    Tachycardia        BMI Counseling: Body mass index is 28 42 kg/m²  The BMI is above normal  Nutrition recommendations include decreasing portion sizes, encouraging healthy choices of fruits and vegetables, decreasing fast food intake, consuming healthier snacks, limiting drinks that contain sugar and moderation in carbohydrate intake  Exercise recommendations include exercising 3-5 times per week  No pharmacotherapy was ordered  Tobacco Cessation Counseling: Tobacco cessation counseling was not provided  The patient is sincerely urged to quit consumption of tobacco  He is not ready to quit tobacco  Patient is a nonsmoker    Subjective:      Patient ID: Jordan Jimenez is a 20 y o  male  This is a 74-year-old white male presents to the office today with his mother  The patient was in today for routine checkup  The patient is feeling well  Once again, he denies taking any over-the-counter sinus medications, decongestants, energy drinks  He does not watch his sodium intake    He has been feeling well in general       The following portions of the patient's history were reviewed and updated as appropriate: allergies, current medications, past family history, past medical history, past social history, past surgical history and problem list     Review of Systems   Endocrine: Negative for polydipsia, polyphagia and polyuria  Neurological: Negative for dizziness and headaches  Objective:      /78 (BP Location: Left arm, Patient Position: Sitting, Cuff Size: Adult)   Pulse 93   Temp (!) 97 °F (36 1 °C) (Tympanic)   Ht 5' 5" (1 651 m)   Wt 77 5 kg (170 lb 12 8 oz)   SpO2 99%   BMI 28 42 kg/m²          Physical Exam   Constitutional: He appears well-developed and well-nourished  No distress  HENT:   Head: Normocephalic and atraumatic  Right Ear: External ear normal    Left Ear: External ear normal    Mouth/Throat: Oropharynx is clear and moist  No oropharyngeal exudate  Eyes: Pupils are equal, round, and reactive to light  Conjunctivae are normal  No scleral icterus  Neck: Neck supple  No tracheal deviation present  No thyromegaly present  Cardiovascular: Normal rate, regular rhythm and normal heart sounds  Exam reveals no gallop and no friction rub  No murmur heard  Pulmonary/Chest: Effort normal and breath sounds normal  No stridor  No respiratory distress  He has no wheezes  He has no rales  Lymphadenopathy:     He has no cervical adenopathy  Vitals reviewed      extremities:  Without cyanosis, clubbing, or edema

## 2020-04-01 DIAGNOSIS — J30.2 SEASONAL ALLERGIES: ICD-10-CM

## 2020-04-01 RX ORDER — FLUTICASONE PROPIONATE 50 MCG
SPRAY, SUSPENSION (ML) NASAL
Qty: 16 G | Refills: 5 | Status: SHIPPED | OUTPATIENT
Start: 2020-04-01 | End: 2021-02-05

## 2020-04-01 RX ORDER — CETIRIZINE HYDROCHLORIDE 10 MG/1
TABLET ORAL
Qty: 30 TABLET | Refills: 5 | Status: SHIPPED | OUTPATIENT
Start: 2020-04-01 | End: 2020-10-12

## 2020-07-21 ENCOUNTER — APPOINTMENT (OUTPATIENT)
Dept: LAB | Facility: HOSPITAL | Age: 20
End: 2020-07-21
Attending: FAMILY MEDICINE
Payer: COMMERCIAL

## 2020-07-21 DIAGNOSIS — E11.9 TYPE 2 DIABETES MELLITUS WITHOUT COMPLICATION, WITHOUT LONG-TERM CURRENT USE OF INSULIN (HCC): ICD-10-CM

## 2020-07-21 LAB
ANION GAP SERPL CALCULATED.3IONS-SCNC: 7 MMOL/L (ref 4–13)
BUN SERPL-MCNC: 10 MG/DL (ref 5–25)
CALCIUM SERPL-MCNC: 9 MG/DL (ref 8.3–10.1)
CHLORIDE SERPL-SCNC: 104 MMOL/L (ref 100–108)
CO2 SERPL-SCNC: 29 MMOL/L (ref 21–32)
CREAT SERPL-MCNC: 0.77 MG/DL (ref 0.6–1.3)
EST. AVERAGE GLUCOSE BLD GHB EST-MCNC: 151 MG/DL
GFR SERPL CREATININE-BSD FRML MDRD: 130 ML/MIN/1.73SQ M
GLUCOSE P FAST SERPL-MCNC: 165 MG/DL (ref 65–99)
HBA1C MFR BLD: 6.9 %
POTASSIUM SERPL-SCNC: 4.1 MMOL/L (ref 3.5–5.3)
SODIUM SERPL-SCNC: 140 MMOL/L (ref 136–145)

## 2020-07-21 PROCEDURE — 3044F HG A1C LEVEL LT 7.0%: CPT | Performed by: FAMILY MEDICINE

## 2020-07-21 PROCEDURE — 83036 HEMOGLOBIN GLYCOSYLATED A1C: CPT

## 2020-07-21 PROCEDURE — 80048 BASIC METABOLIC PNL TOTAL CA: CPT

## 2020-07-21 PROCEDURE — 36415 COLL VENOUS BLD VENIPUNCTURE: CPT

## 2020-07-28 ENCOUNTER — OFFICE VISIT (OUTPATIENT)
Dept: FAMILY MEDICINE CLINIC | Facility: CLINIC | Age: 20
End: 2020-07-28
Payer: COMMERCIAL

## 2020-07-28 VITALS
BODY MASS INDEX: 29.46 KG/M2 | DIASTOLIC BLOOD PRESSURE: 78 MMHG | WEIGHT: 176.8 LBS | HEIGHT: 65 IN | HEART RATE: 91 BPM | TEMPERATURE: 98 F | OXYGEN SATURATION: 99 % | SYSTOLIC BLOOD PRESSURE: 126 MMHG

## 2020-07-28 DIAGNOSIS — E78.5 DYSLIPIDEMIA: ICD-10-CM

## 2020-07-28 DIAGNOSIS — E11.9 TYPE 2 DIABETES MELLITUS WITHOUT COMPLICATION, WITHOUT LONG-TERM CURRENT USE OF INSULIN (HCC): Primary | ICD-10-CM

## 2020-07-28 LAB
LEFT EYE DIABETIC RETINOPATHY: NORMAL
RIGHT EYE DIABETIC RETINOPATHY: NORMAL
SEVERITY (EYE EXAM): NORMAL

## 2020-07-28 PROCEDURE — 1036F TOBACCO NON-USER: CPT | Performed by: FAMILY MEDICINE

## 2020-07-28 PROCEDURE — 3008F BODY MASS INDEX DOCD: CPT | Performed by: FAMILY MEDICINE

## 2020-07-28 PROCEDURE — 3044F HG A1C LEVEL LT 7.0%: CPT | Performed by: FAMILY MEDICINE

## 2020-07-28 PROCEDURE — 99213 OFFICE O/P EST LOW 20 MIN: CPT | Performed by: FAMILY MEDICINE

## 2020-07-28 NOTE — PATIENT INSTRUCTIONS
Diabetic Retinopathy   AMBULATORY CARE:   Diabetic retinopathy (DR)  is eye damage caused by long-term high blood sugar levels  The walls of the blood vessels in the retina weaken and leak blood  This causes swelling and vision problems  Over time, new, weak blood vessels grow, leak blood, and cover the center of the retina  DR can lead to blindness  Common symptoms include the following:   · Blurred vision    · Seeing red or black wavy lines that look like a curtain or spider web    · Seeing light flashes or red, black, or grey floating spots (floaters)    · Vision loss  Call 911 for any of the following:   · You suddenly cannot see  Contact your healthcare provider if:   · Your blurred vision gets worse, or you start to see double  · You see more floating spots  · You see dark spots  · You have questions or concerns about your condition or care  Treatment  may not be needed if you have mild DR  Healthcare providers will check your eyes regularly to monitor the damage to your retinas  If you have severe DR, you may need surgery to slow or stop the disease  Laser treatment may slow DR and prevent blindness  This treatment shrinks the new blood vessels and seals the areas that have leaks  Surgery called a vitrectomy may be done if there is bleeding in the vitreous that does not clear  The vitreous is the gel-like material that fills the inside of the eye  Prevent DR:   · Control your blood sugar  Keep your blood sugar levels as close to normal as possible  You may need to check your blood sugar levels 3 times each day  · Get your eyes checked at least once each year  Your eye doctor may want to see you every 6 months or more often  If you are pregnant, get your eyes checked during the first 13 weeks of your pregnancy  You will need frequent eye exams during pregnancy and for 1 year after you give birth  · Manage your blood pressure and cholesterol    Your blood pressure should be 140/90 mmHg or lower  You may need lab tests that measure the amount of cholesterol in your blood  You may need to make lifestyle changes and take medicines to control your blood pressure and cholesterol  · Exercise regularly  Ask your healthcare provider about the best exercise plan for you  He will tell you how to control your blood sugar when you exercise  You may need to check your blood sugar more often during exercise  Bring a snack with you when you exercise in case your blood sugar gets too low  · Do not smoke  Nicotine can damage blood vessels in your eyes and make it more difficult to manage your diabetes  Do not use e-cigarettes or smokeless tobacco in place of cigarettes or to help you quit  They still contain nicotine  Ask your healthcare provider for information if you currently smoke and need help quitting  Follow up with your healthcare provider or eye specialist as directed:  Write down your questions so you remember to ask them during your visits  © 2017 [address] Information is for End User's use only and may not be sold, redistributed or otherwise used for commercial purposes  All illustrations and images included in CareNotes® are the copyrighted property of A D [employer] M [employer] Inc  or [employer] Burris  The above information is an  only  It is not intended as medical advice for individual conditions or treatments  Talk to your doctor, nurse or pharmacist before following any medical regimen to see if it is safe and effective for you

## 2020-07-28 NOTE — PROGRESS NOTES
Assessment/Plan:    Type 2 diabetes mellitus without complication, without long-term current use of insulin ([facility])    Lab Results   Component Value Date    HGBA1C 6 9 (H) 07/21/2020    Patient has type 2 diabetes mellitus  His weight is increased by 6 lb since his last visit  His fasting blood sugars elevated at 165  His hemoglobin A1c is elevated at 6 9  I asked the patient to get back on track with his diet  I want to see his weight down under 170 lb by the time he returns in 4 months  I want his hemoglobin A1c at 6 5 or less, given his young age  Although he is only 23years old, he has documented type 2 diabetes mellitus since he was 12  At that time, he had been under the care of a different physician  His diabetes has been well controlled on metformin  Hopefully, we can avoid having to add another agent  We discussed routine diabetic foot care  I also recommended a dilated eye exam yearly  I counseled the patient regarding starting an exercise program     Dyslipidemia  Patient is currently not on statin therapy  A fasting lipid panel has been ordered  His goal LDL cholesterol is less than 70  If he does not meet this goal, I will need to speak with his mother about starting statin therapy  Diagnoses and all orders for this visit:    Type 2 diabetes mellitus without complication, without long-term current use of insulin (HCC)  -     Comprehensive metabolic panel; Future  -     Hemoglobin A1C; Future  -     Microalbumin / creatinine urine ratio    Dyslipidemia  -     Lipid panel; Future        BMI Counseling: Body mass index is 29 42 kg/m²  The BMI is above normal  Nutrition recommendations include reducing portion sizes, decreasing overall calorie intake, 3-5 servings of fruits/vegetables daily, reducing fast food intake, consuming healthier snacks, decreasing soda and/or juice intake and moderation in carbohydrate intake  Exercise recommendations include exercising 3-5 times per week  Subjective:      Patient ID: Gretel Penaloza is a 23 y o  male  This is a 21-year-old white male who presents to the office today with his mother  Patient is here today for follow-up of his type 2 diabetes  Patient has not been very compliant with his diet  He has gained 6 lb since his last visit  He is not very active  He has not been walking  The following portions of the patient's history were reviewed and updated as appropriate: allergies, current medications, past family history, past medical history, past social history, past surgical history and problem list     Review of Systems   Constitutional: Negative for chills and fever  Respiratory: Negative for cough, shortness of breath and wheezing  Cardiovascular: Negative for chest pain, palpitations and leg swelling  Endocrine: Negative for polydipsia, polyphagia and polyuria  Objective:      /78 (BP Location: Left arm, Patient Position: Sitting, Cuff Size: Adult)   Pulse 91   Temp 98 °F (36 7 °C) (Temporal)   Ht 5' 5" (1 651 m)   Wt 80 2 kg (176 lb 12 8 oz)   SpO2 99%   BMI 29 42 kg/m²          Physical Exam   Constitutional:   This is a 21-year-old white male who appears his stated age  He is in no distress   HENT:   Head: Normocephalic and atraumatic  Right Ear: External ear normal    Left Ear: External ear normal    Mouth/Throat: Oropharynx is clear and moist  No oropharyngeal exudate  Tympanic membranes are clear   Eyes: Pupils are equal, round, and reactive to light  Conjunctivae are normal  Right eye exhibits no discharge  Left eye exhibits no discharge  No scleral icterus  Neck: Neck supple  No tracheal deviation present  No thyromegaly present  Cardiovascular: Normal rate, regular rhythm and normal heart sounds  Exam reveals no gallop and no friction rub  Pulses are no weak pulses  No murmur heard  Pulses:       Dorsalis pedis pulses are 2+ on the right side, and 2+ on the left side  Posterior tibial pulses are 2+ on the right side, and 2+ on the left side  Pulmonary/Chest: Effort normal and breath sounds normal  No stridor  No respiratory distress  He has no wheezes  He has no rales  Abdominal: Soft  Bowel sounds are normal  He exhibits no distension and no mass  There is no tenderness  There is no guarding  There is no organomegaly noted   Feet:   Right Foot:   Skin Integrity: Negative for ulcer, skin breakdown, erythema, warmth, callus or dry skin  Left Foot:   Skin Integrity: Negative for ulcer, skin breakdown, erythema, warmth, callus or dry skin  Lymphadenopathy:     He has no cervical adenopathy  Psychiatric:   Patient is nonverbal   Vitals reviewed  Patient's shoes and socks removed  Right Foot/Ankle   Right Foot Inspection  Skin Exam: skin normal and skin intact no dry skin, no warmth, no callus, no erythema, no maceration, no abnormal color, no pre-ulcer, no ulcer and no callus                          Toe Exam: no swelling, no tenderness, erythema and  no right toe deformity  Sensory   Vibration: intact  Proprioception: intact   Monofilament testing: intact  Vascular  Capillary refills: < 3 seconds  The right DP pulse is 2+  The right PT pulse is 2+  Left Foot/Ankle  Left Foot Inspection  Skin Exam: skin normal and skin intactno dry skin, no warmth, no erythema, no maceration, normal color, no pre-ulcer, no ulcer and no callus                         Toe Exam: no swelling, no tenderness, no erythema and no left toe deformity                   Sensory   Vibration: intact  Proprioception: intact  Monofilament: intact  Vascular  Capillary refills: < 3 seconds  The left DP pulse is 2+  The left PT pulse is 2+  Assign Risk Category:  No deformity present; No loss of protective sensation;  No weak pulses       Risk: 0

## 2020-09-11 DIAGNOSIS — E11.9 TYPE 2 DIABETES MELLITUS WITHOUT COMPLICATION, WITHOUT LONG-TERM CURRENT USE OF INSULIN (HCC): ICD-10-CM

## 2020-10-10 DIAGNOSIS — J30.2 SEASONAL ALLERGIES: ICD-10-CM

## 2020-10-12 RX ORDER — CETIRIZINE HYDROCHLORIDE 10 MG/1
TABLET ORAL
Qty: 30 TABLET | Refills: 0 | Status: SHIPPED | OUTPATIENT
Start: 2020-10-12 | End: 2020-12-02

## 2020-11-28 ENCOUNTER — LAB (OUTPATIENT)
Dept: LAB | Facility: HOSPITAL | Age: 20
End: 2020-11-28
Attending: FAMILY MEDICINE
Payer: COMMERCIAL

## 2020-11-28 DIAGNOSIS — E78.5 DYSLIPIDEMIA: ICD-10-CM

## 2020-11-28 DIAGNOSIS — E11.9 TYPE 2 DIABETES MELLITUS WITHOUT COMPLICATION, WITHOUT LONG-TERM CURRENT USE OF INSULIN (HCC): ICD-10-CM

## 2020-11-28 LAB
ALBUMIN SERPL BCP-MCNC: 4.5 G/DL (ref 3.5–5)
ALP SERPL-CCNC: 65 U/L (ref 46–116)
ALT SERPL W P-5'-P-CCNC: 27 U/L (ref 12–78)
ANION GAP SERPL CALCULATED.3IONS-SCNC: 10 MMOL/L (ref 4–13)
AST SERPL W P-5'-P-CCNC: 19 U/L (ref 5–45)
BILIRUB SERPL-MCNC: 0.5 MG/DL (ref 0.2–1)
BUN SERPL-MCNC: 7 MG/DL (ref 5–25)
CALCIUM SERPL-MCNC: 9.3 MG/DL (ref 8.3–10.1)
CHLORIDE SERPL-SCNC: 103 MMOL/L (ref 100–108)
CHOLEST SERPL-MCNC: 148 MG/DL (ref 50–200)
CO2 SERPL-SCNC: 26 MMOL/L (ref 21–32)
CREAT SERPL-MCNC: 0.68 MG/DL (ref 0.6–1.3)
CREAT UR-MCNC: 115 MG/DL
EST. AVERAGE GLUCOSE BLD GHB EST-MCNC: 120 MG/DL
GFR SERPL CREATININE-BSD FRML MDRD: 137 ML/MIN/1.73SQ M
GLUCOSE P FAST SERPL-MCNC: 120 MG/DL (ref 65–99)
HBA1C MFR BLD: 5.8 %
HDLC SERPL-MCNC: 47 MG/DL
LDLC SERPL CALC-MCNC: 86 MG/DL (ref 0–100)
MICROALBUMIN UR-MCNC: 14.5 MG/L (ref 0–20)
MICROALBUMIN/CREAT 24H UR: 13 MG/G CREATININE (ref 0–30)
NONHDLC SERPL-MCNC: 101 MG/DL
POTASSIUM SERPL-SCNC: 4 MMOL/L (ref 3.5–5.3)
PROT SERPL-MCNC: 7.7 G/DL (ref 6.4–8.2)
SODIUM SERPL-SCNC: 139 MMOL/L (ref 136–145)
TRIGL SERPL-MCNC: 73 MG/DL

## 2020-11-28 PROCEDURE — 36415 COLL VENOUS BLD VENIPUNCTURE: CPT

## 2020-11-28 PROCEDURE — 80053 COMPREHEN METABOLIC PANEL: CPT

## 2020-11-28 PROCEDURE — 82043 UR ALBUMIN QUANTITATIVE: CPT | Performed by: FAMILY MEDICINE

## 2020-11-28 PROCEDURE — 82570 ASSAY OF URINE CREATININE: CPT | Performed by: FAMILY MEDICINE

## 2020-11-28 PROCEDURE — 80061 LIPID PANEL: CPT

## 2020-11-28 PROCEDURE — 83036 HEMOGLOBIN GLYCOSYLATED A1C: CPT

## 2020-12-02 DIAGNOSIS — L70.0 ACNE VULGARIS: ICD-10-CM

## 2020-12-02 DIAGNOSIS — J30.2 SEASONAL ALLERGIES: ICD-10-CM

## 2020-12-02 RX ORDER — DOXYCYCLINE HYCLATE 100 MG/1
CAPSULE ORAL
Qty: 30 CAPSULE | Refills: 5 | Status: SHIPPED | OUTPATIENT
Start: 2020-12-02 | End: 2021-06-06

## 2020-12-02 RX ORDER — CETIRIZINE HYDROCHLORIDE 10 MG/1
TABLET ORAL
Qty: 30 TABLET | Refills: 5 | Status: SHIPPED | OUTPATIENT
Start: 2020-12-02 | End: 2021-06-06

## 2020-12-03 ENCOUNTER — OFFICE VISIT (OUTPATIENT)
Dept: FAMILY MEDICINE CLINIC | Facility: CLINIC | Age: 20
End: 2020-12-03
Payer: COMMERCIAL

## 2020-12-03 VITALS
DIASTOLIC BLOOD PRESSURE: 78 MMHG | WEIGHT: 169.8 LBS | HEIGHT: 65 IN | BODY MASS INDEX: 28.29 KG/M2 | SYSTOLIC BLOOD PRESSURE: 130 MMHG | TEMPERATURE: 99.1 F | HEART RATE: 135 BPM | OXYGEN SATURATION: 98 %

## 2020-12-03 DIAGNOSIS — E78.5 DYSLIPIDEMIA: ICD-10-CM

## 2020-12-03 DIAGNOSIS — E11.9 TYPE 2 DIABETES MELLITUS WITHOUT COMPLICATION, WITHOUT LONG-TERM CURRENT USE OF INSULIN (HCC): Primary | ICD-10-CM

## 2020-12-03 PROCEDURE — 3008F BODY MASS INDEX DOCD: CPT | Performed by: FAMILY MEDICINE

## 2020-12-03 PROCEDURE — 1036F TOBACCO NON-USER: CPT | Performed by: FAMILY MEDICINE

## 2020-12-03 PROCEDURE — 99213 OFFICE O/P EST LOW 20 MIN: CPT | Performed by: FAMILY MEDICINE

## 2021-02-05 DIAGNOSIS — J30.2 SEASONAL ALLERGIES: ICD-10-CM

## 2021-02-05 RX ORDER — FLUTICASONE PROPIONATE 50 MCG
SPRAY, SUSPENSION (ML) NASAL
Qty: 16 G | Refills: 0 | Status: SHIPPED | OUTPATIENT
Start: 2021-02-05 | End: 2021-03-11 | Stop reason: SDUPTHER

## 2021-03-03 ENCOUNTER — APPOINTMENT (OUTPATIENT)
Dept: LAB | Facility: HOSPITAL | Age: 21
End: 2021-03-03
Attending: FAMILY MEDICINE
Payer: COMMERCIAL

## 2021-03-03 DIAGNOSIS — E11.9 TYPE 2 DIABETES MELLITUS WITHOUT COMPLICATION, WITHOUT LONG-TERM CURRENT USE OF INSULIN (HCC): ICD-10-CM

## 2021-03-03 LAB
ANION GAP SERPL CALCULATED.3IONS-SCNC: 12 MMOL/L (ref 4–13)
BUN SERPL-MCNC: 9 MG/DL (ref 5–25)
CALCIUM SERPL-MCNC: 9.6 MG/DL (ref 8.3–10.1)
CHLORIDE SERPL-SCNC: 103 MMOL/L (ref 100–108)
CO2 SERPL-SCNC: 26 MMOL/L (ref 21–32)
CREAT SERPL-MCNC: 0.76 MG/DL (ref 0.6–1.3)
EST. AVERAGE GLUCOSE BLD GHB EST-MCNC: 117 MG/DL
GFR SERPL CREATININE-BSD FRML MDRD: 130 ML/MIN/1.73SQ M
GLUCOSE P FAST SERPL-MCNC: 113 MG/DL (ref 65–99)
HBA1C MFR BLD: 5.7 %
POTASSIUM SERPL-SCNC: 4 MMOL/L (ref 3.5–5.3)
SODIUM SERPL-SCNC: 141 MMOL/L (ref 136–145)

## 2021-03-03 PROCEDURE — 36415 COLL VENOUS BLD VENIPUNCTURE: CPT

## 2021-03-03 PROCEDURE — 80048 BASIC METABOLIC PNL TOTAL CA: CPT

## 2021-03-03 PROCEDURE — 83036 HEMOGLOBIN GLYCOSYLATED A1C: CPT

## 2021-03-03 PROCEDURE — 3044F HG A1C LEVEL LT 7.0%: CPT | Performed by: FAMILY MEDICINE

## 2021-03-11 ENCOUNTER — OFFICE VISIT (OUTPATIENT)
Dept: FAMILY MEDICINE CLINIC | Facility: CLINIC | Age: 21
End: 2021-03-11
Payer: COMMERCIAL

## 2021-03-11 VITALS
HEIGHT: 66 IN | SYSTOLIC BLOOD PRESSURE: 128 MMHG | BODY MASS INDEX: 26.97 KG/M2 | TEMPERATURE: 98.2 F | DIASTOLIC BLOOD PRESSURE: 80 MMHG | WEIGHT: 167.8 LBS | HEART RATE: 116 BPM | OXYGEN SATURATION: 98 %

## 2021-03-11 DIAGNOSIS — J30.2 SEASONAL ALLERGIES: ICD-10-CM

## 2021-03-11 DIAGNOSIS — E78.5 DYSLIPIDEMIA: ICD-10-CM

## 2021-03-11 DIAGNOSIS — E11.9 TYPE 2 DIABETES MELLITUS WITHOUT COMPLICATION, WITHOUT LONG-TERM CURRENT USE OF INSULIN (HCC): Primary | ICD-10-CM

## 2021-03-11 DIAGNOSIS — L70.0 ACNE VULGARIS: ICD-10-CM

## 2021-03-11 PROCEDURE — 1036F TOBACCO NON-USER: CPT | Performed by: FAMILY MEDICINE

## 2021-03-11 PROCEDURE — 3008F BODY MASS INDEX DOCD: CPT | Performed by: FAMILY MEDICINE

## 2021-03-11 PROCEDURE — 99213 OFFICE O/P EST LOW 20 MIN: CPT | Performed by: FAMILY MEDICINE

## 2021-03-11 RX ORDER — CLINDAMYCIN PHOSPHATE 11.9 MG/ML
SOLUTION TOPICAL 2 TIMES DAILY
Qty: 60 ML | Refills: 5 | Status: SHIPPED | OUTPATIENT
Start: 2021-03-11 | End: 2021-08-16 | Stop reason: SDUPTHER

## 2021-03-11 RX ORDER — FLUTICASONE PROPIONATE 50 MCG
2 SPRAY, SUSPENSION (ML) NASAL DAILY
Qty: 16 G | Refills: 5 | Status: SHIPPED | OUTPATIENT
Start: 2021-03-11 | End: 2021-08-16 | Stop reason: SDUPTHER

## 2021-03-11 NOTE — PATIENT INSTRUCTIONS
Diabetes and Exercise   WHAT YOU NEED TO KNOW:   Physical activity, such as exercise, can help keep your blood sugar level steady or improve insulin resistance  Activity can help decrease your risk for heart disease, and help you lose weight  Exercise can also help lower your A1c  Your diabetes care team will help you create an exercise plan  The plan will be based on the type of diabetes you have and your starting fitness level  DISCHARGE INSTRUCTIONS:   Call your local emergency number (911 in the [address] [facility] [facility]) if:   · You have chest pain or shortness of breath  Seek care immediately if:   · You have a low blood sugar level and it does not improve with treatment  Symptoms are trouble thinking, a pounding heartbeat, and sweating  · Your blood sugar level is above 240 mg/dL and does not come down within 15 minutes of treatment  · You have blurred or double vision  · Your breath has a fruity, sweet smell, or your breathing is shallow  Call your doctor or diabetes care team if:   · You have ketones in your blood or urine  · You have a fever  · Your blood sugar levels are higher than your target goals  · You often have low blood sugar levels  · Your skin is red, dry, warm, or swollen  · You have a wound that does not heal     · You have trouble coping with diabetes, or you feel anxious or depressed  · You have questions or concerns about your condition or care  Tips to help you create and meet your exercise goals:   · Set a goal for 150 minutes (2 5 hours) of moderate to vigorous aerobic activity at least 5 times a week  Aerobic activity helps your heart stay strong  Aerobic activity includes walking, bicycling, dancing, swimming, and raking leaves  Spread aerobic activity over 3 to 5 days  Do not take more than 2 days off in a row  It is best to do at least 10 minutes at a time and 30 minutes each day  You can work up to these goals  Remember that any activity is better than no activity  Over time, you can make exercise more intense or last longer  You can also add more days of exercise as your fitness level improves  Your diabetes care team can help you make a step-by-step plan to achieve your goals  · Set a strength training goal of 2 to 3 times a week  Strength training helps you keep the muscles you have and build new muscles  Strength training includes lifting weights, climbing stairs, yoga, and tigist chi     · Older adults should include balance training 2 to 3 times each week  These include walking backwards, standing on one foot, and walking heel to toe in a straight line  Other healthy exercise tips:   · Stretch before and after you exercise to prevent injury  · Drink water or liquids that do not contain sugar before, during, and after exercise  Ask your dietitian or healthcare provider which liquids you should drink when you exercise  · Do not sit for longer than 30 minutes at a time during your day  If you cannot walk around, at least stand up  This will help you stay active and keep your blood circulating  Exercise and blood sugar levels:  Check your blood sugar level before and after exercise, if you use insulin  Healthcare providers may tell you to change the amount of insulin you take or food you eat  · If your blood sugar level is high, check your blood or urine for ketones before you exercise  Do not exercise if your blood sugar level is high and you have ketones  · If your blood sugar level is less than 100 mg/dL, have a carbohydrate snack before you exercise  Examples are 4 to 6 crackers, ½ banana, 8 ounces (1 cup) of milk, or 4 ounces (½ cup) of juice  Follow up with your doctor or diabetes care team as directed:  Write down your questions so you remember to ask them during your visits  © Copyright [address] Information is for End User's use only and may not be sold, redistributed or otherwise used for commercial purposes   All illustrations and images included in CareNotes® are the copyrighted property of [employer] M [employer] Inc  or Janice Irvin  The above information is an  only  It is not intended as medical advice for individual conditions or treatments  Talk to your doctor, nurse or pharmacist before following any medical regimen to see if it is safe and effective for you

## 2021-03-11 NOTE — PROGRESS NOTES
Assessment/Plan:    Type 2 diabetes mellitus without complication, without long-term current use of insulin ([facility])    Lab Results   Component Value Date    HGBA1C 5 7 (H) 03/03/2021     Patient is a 14-year-old white male with a known history of type 2 diabetes mellitus  His fasting blood glucose was 113  Hemoglobin A1c is 5 7%  Diabetes is under excellent control  He should continue metformin  I encouraged the patient to try to walk regularly for exercise  He just got a puppy  This is an excellent for exercise  I recommended he take the dog out for a walk daily  We again discussed routine diabetic foot care  Acne vulgaris    I renewed the patient's clindamycin and benzoyl peroxide for treatment of his acne, which is currently fairly well controlled  Seasonal allergies    Allergies are currently stable  I renewed the patient's Flonase       Diagnoses and all orders for this visit:    Type 2 diabetes mellitus without complication, without long-term current use of insulin ([facility])  -     metFORMIN (GLUCOPHAGE) 1000 MG tablet; Take 1 tablet (1,000 mg total) by mouth 2 (two) times a day  -     Hemoglobin A1C; Future  -     Comprehensive metabolic panel; Future    Acne vulgaris  -     benzoyl peroxide 5 % gel; Apply topically 2 (two) times a day  -     clindamycin (CLEOCIN T) 1 % external solution; Apply topically 2 (two) times a day    Seasonal allergies  -     fluticasone (FLONASE) 50 mcg/act nasal spray; 2 sprays into each nostril daily    Dyslipidemia  -     Lipid panel; Future          Subjective:      Patient ID: Amalia Li is a 23 y o  male  This is a 14-year-old white male who presents to the office today for his routine checkup  He is accompanied to the office by his father  Patient reports no complaints  He feels well  He tells me his blood sugars have been doing good  He does not exercise    His father tells me he is very inactive in basically likes to sit in his room in play video games  He did see Dr Chapin Obrien last summer and had a dilated eye exam   Unfortunately, I do not have these results  I will have to contact her office  The following portions of the patient's history were reviewed and updated as appropriate: allergies, current medications, past family history, past medical history, past social history, past surgical history and problem list     Review of Systems   Respiratory: Negative for cough, shortness of breath and wheezing  Cardiovascular: Negative for chest pain, palpitations and leg swelling  Gastrointestinal: Negative for abdominal distention, abdominal pain, blood in stool, constipation, diarrhea and nausea  Objective:      /80 (BP Location: Left arm, Patient Position: Sitting, Cuff Size: Adult)   Pulse (!) 116   Temp 98 2 °F (36 8 °C) (Temporal)   Ht 5' 6" (1 676 m)   Wt 76 1 kg (167 lb 12 8 oz)   SpO2 98%   BMI 27 08 kg/m²          Physical Exam  Vitals signs reviewed  Constitutional:       Comments: This is a 53-year-old white male who appears his stated age  He is pleasant, cooperative, and in no distress   HENT:      Right Ear: Tympanic membrane, ear canal and external ear normal  There is no impacted cerumen  Left Ear: Tympanic membrane, ear canal and external ear normal  There is no impacted cerumen  Mouth/Throat:      Mouth: Mucous membranes are moist       Pharynx: Oropharynx is clear  No oropharyngeal exudate or posterior oropharyngeal erythema  Eyes:      General: No scleral icterus  Right eye: No discharge  Left eye: No discharge  Conjunctiva/sclera: Conjunctivae normal       Pupils: Pupils are equal, round, and reactive to light  Neck:      Musculoskeletal: Neck supple  Comments: There is no thyromegaly noted  Cardiovascular:      Rate and Rhythm: Normal rate and regular rhythm  Pulses:           Dorsalis pedis pulses are 2+ on the right side and 2+ on the left side          Posterior tibial pulses are 2+ on the right side and 2+ on the left side  Heart sounds: Normal heart sounds  No murmur  No friction rub  No gallop  Pulmonary:      Effort: Pulmonary effort is normal  No respiratory distress  Breath sounds: Normal breath sounds  No stridor  No wheezing, rhonchi or rales  Abdominal:      General: Bowel sounds are normal  There is no distension  Palpations: Abdomen is soft  There is no mass  Tenderness: There is no abdominal tenderness  There is no guarding  Comments: There was no hepatosplenomegaly   Musculoskeletal:      Right lower leg: No edema  Left lower leg: No edema  Feet:      Right foot:      Skin integrity: No ulcer, skin breakdown, erythema, warmth, callus or dry skin  Left foot:      Skin integrity: No ulcer, skin breakdown, erythema, warmth, callus or dry skin  Lymphadenopathy:      Cervical: No cervical adenopathy  Patient's shoes and socks removed  Right Foot/Ankle   Right Foot Inspection  Skin Exam: skin normal and skin intact no dry skin, no warmth, no callus, no erythema, no maceration, no abnormal color, no pre-ulcer, no ulcer and no callus                          Toe Exam: no swelling, no tenderness, erythema and  no right toe deformity  Sensory   Vibration: intact  Proprioception: intact   Monofilament testing: intact  Vascular  Capillary refills: < 3 seconds  The right DP pulse is 2+  The right PT pulse is 2+  Left Foot/Ankle  Left Foot Inspection  Skin Exam: skin normal and skin intactno dry skin, no warmth, no erythema, no maceration, normal color, no pre-ulcer, no ulcer and no callus                         Toe Exam: no swelling, no tenderness, no erythema and no left toe deformity                   Sensory   Vibration: intact  Proprioception: intact  Monofilament: intact  Vascular  Capillary refills: < 3 seconds  The left DP pulse is 2+  The left PT pulse is 2+

## 2021-03-12 ENCOUNTER — TELEPHONE (OUTPATIENT)
Dept: ADMINISTRATIVE | Facility: OTHER | Age: 21
End: 2021-03-12

## 2021-03-12 NOTE — ASSESSMENT & PLAN NOTE
Lab Results   Component Value Date    HGBA1C 5 7 (H) 03/03/2021     Patient is a 40-year-old white male with a known history of type 2 diabetes mellitus  His fasting blood glucose was 113  Hemoglobin A1c is 5 7%  Diabetes is under excellent control  He should continue metformin  I encouraged the patient to try to walk regularly for exercise  He just got a puppy  This is an excellent for exercise  I recommended he take the dog out for a walk daily  We again discussed routine diabetic foot care

## 2021-03-12 NOTE — ASSESSMENT & PLAN NOTE
I renewed the patient's clindamycin and benzoyl peroxide for treatment of his acne, which is currently fairly well controlled

## 2021-03-12 NOTE — TELEPHONE ENCOUNTER
Upon review of the In Basket request and the patient's chart, initial outreach has been made via fax, please see Contacts section for details       Thank you  Yvette Garcia

## 2021-03-12 NOTE — TELEPHONE ENCOUNTER
----- Message from Nahomy Reyes sent at 3/11/2021  1:04 PM EST -----  Regarding: diabetic eye hoa   03/11/21 1:04 PM    Edgardo, our patient Mesfin Webb has had Diabetic Eye Exam completed/performed  Please assist in updating the patient chart by making an External outreach to Dr Chapin Obrien facility located in [place] The date of service is 07/28/2020      Thank you,  Rubi Malhotra MA  [facility] PRIMARY CARE

## 2021-03-12 NOTE — LETTER
Diabetic Eye Exam Form    Date Requested: 21  Patient: Dmitry Robles  Patient : 2000   Referring Provider: Vadim Holcomb DO    Dilated Retinal Exam, Optomap-Iris Exam, or Fundus Photography Done         Yes ([place] one above)         No     Date of Diabetic Eye Exam ______________________________  Left Eye      Exam did show retinopathy    Exam did not show retinopathy         Mild       Moderate       None       Proliferative       Severe     Right Eye     Exam did show retinopathy    Exam did not show retinopathy         Mild       Moderate       None       Proliferative       Severe     Comments __________________________________________________________    Practice Providing Exam ______________________________________________    Exam Performed By (print name) _______________________________________      Provider Signature ___________________________________________________      These reports are needed for  compliance    Please fax this completed form and a copy of the Diabetic Eye Exam report to our office located at [place] [address] as soon as possible to [phone] osmin Yip: Phone [phone]    We thank you for your assistance in treating our mutual patient

## 2021-03-22 NOTE — TELEPHONE ENCOUNTER
As a follow-up, a second attempt has been made for outreach via fax, please see Contacts section for details      Thank you  Anita Walls

## 2021-03-29 NOTE — TELEPHONE ENCOUNTER
As a final attempt, a third outreach has been made via telephone call  The outcome of the telephone call was a fax request form to be sent to Office  Please see Contacts section for details  This encounter will be closed and completed by end of day  Should we receive the requested information because of previous outreach attempts, the requested patient's chart will be updated appropriately       Thank you  Latoya Cervantes

## 2021-03-31 NOTE — TELEPHONE ENCOUNTER
Upon review of the In Basket request we were able to locate, review, and update the patient chart as requested for Diabetic Eye Exam     Any additional questions or concerns should be emailed to the Practice Liaisons via [email or web address] email, please do not reply via In Basket      Thank you  Nan Roberto

## 2021-04-19 ENCOUNTER — HOSPITAL ENCOUNTER (INPATIENT)
Facility: HOSPITAL | Age: 21
LOS: 3 days | Discharge: HOME | DRG: 883 | End: 2021-04-23
Attending: EMERGENCY MEDICINE | Admitting: PSYCHIATRY & NEUROLOGY
Payer: COMMERCIAL

## 2021-04-19 DIAGNOSIS — F63.81 INTERMITTENT EXPLOSIVE DISORDER: ICD-10-CM

## 2021-04-19 DIAGNOSIS — F84.0 AUTISM: Primary | ICD-10-CM

## 2021-04-19 DIAGNOSIS — S00.01XA ABRASION OF SCALP, INITIAL ENCOUNTER: ICD-10-CM

## 2021-04-19 LAB
AMPHETAMINES SERPL QL SCN: NEGATIVE
BARBITURATES UR QL: NEGATIVE
BENZODIAZ UR QL: NEGATIVE
COCAINE UR QL: NEGATIVE
ETHANOL EXG-MCNC: 0 MG/DL
FLUAV RNA RESP QL NAA+PROBE: NEGATIVE
FLUBV RNA RESP QL NAA+PROBE: NEGATIVE
METHADONE UR QL: NEGATIVE
OPIATES UR QL SCN: NEGATIVE
OXYCODONE+OXYMORPHONE UR QL SCN: NEGATIVE
PCP UR QL: NEGATIVE
RSV RNA RESP QL NAA+PROBE: NEGATIVE
SARS-COV-2 RNA RESP QL NAA+PROBE: NEGATIVE
THC UR QL: NEGATIVE

## 2021-04-19 PROCEDURE — 82075 ASSAY OF BREATH ETHANOL: CPT | Performed by: EMERGENCY MEDICINE

## 2021-04-19 PROCEDURE — 80307 DRUG TEST PRSMV CHEM ANLYZR: CPT | Performed by: EMERGENCY MEDICINE

## 2021-04-19 PROCEDURE — 90471 IMMUNIZATION ADMIN: CPT

## 2021-04-19 PROCEDURE — 0241U HB NFCT DS VIR RESP RNA 4 TRGT: CPT | Performed by: EMERGENCY MEDICINE

## 2021-04-19 PROCEDURE — 99285 EMERGENCY DEPT VISIT HI MDM: CPT | Performed by: EMERGENCY MEDICINE

## 2021-04-19 PROCEDURE — 99285 EMERGENCY DEPT VISIT HI MDM: CPT

## 2021-04-19 PROCEDURE — 90715 TDAP VACCINE 7 YRS/> IM: CPT | Performed by: EMERGENCY MEDICINE

## 2021-04-19 RX ORDER — BACITRACIN, NEOMYCIN, POLYMYXIN B 400; 3.5; 5 [USP'U]/G; MG/G; [USP'U]/G
1 OINTMENT TOPICAL ONCE
Status: COMPLETED | OUTPATIENT
Start: 2021-04-19 | End: 2021-04-19

## 2021-04-19 RX ADMIN — TETANUS TOXOID, REDUCED DIPHTHERIA TOXOID AND ACELLULAR PERTUSSIS VACCINE, ADSORBED 0.5 ML: 5; 2.5; 8; 8; 2.5 SUSPENSION INTRAMUSCULAR at 19:05

## 2021-04-19 RX ADMIN — METFORMIN HYDROCHLORIDE 1000 MG: 500 TABLET, FILM COATED ORAL at 20:17

## 2021-04-19 RX ADMIN — BACITRACIN, NEOMYCIN, POLYMYXIN B 1 SMALL APPLICATION: 400; 3.5; 5 OINTMENT TOPICAL at 19:04
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
## 2021-04-19 NOTE — ED NOTES
CW was asked to speak with father of patient by registration  Father was loud and yelling at [facility] stating "I have been here an hour and you are the first person I am speaking with"  CW explained that until patient is medically cleared we do not see patient, but understood a 3023 wanted to be initiated  Father continued to be loud and yell, CW stated she would provide number for county crisis to petition a 302  Father stated that his wife Dayne Watkins was outside calling someone and they had no idea how to do this  CW explained process he stated that the number needed to be given to his wife, [facility] asked where she wife  CW went outside and provided county crisis number to [employer] mother Dayne Watkins and explained how to petition a 36      Redd Ling

## 2021-04-20 RX ORDER — OLANZAPINE 10 MG/1
5 INJECTION, POWDER, LYOPHILIZED, FOR SOLUTION INTRAMUSCULAR
Status: DISCONTINUED | OUTPATIENT
Start: 2021-04-20 | End: 2021-04-23 | Stop reason: HOSPADM

## 2021-04-20 RX ORDER — DIPHENHYDRAMINE HYDROCHLORIDE 50 MG/ML
50 INJECTION INTRAMUSCULAR; INTRAVENOUS EVERY 6 HOURS PRN
Status: DISCONTINUED | OUTPATIENT
Start: 2021-04-20 | End: 2021-04-23 | Stop reason: HOSPADM

## 2021-04-20 RX ORDER — ACETAMINOPHEN 325 MG/1
650 TABLET ORAL EVERY 6 HOURS PRN
Status: DISCONTINUED | OUTPATIENT
Start: 2021-04-20 | End: 2021-04-23 | Stop reason: HOSPADM

## 2021-04-20 RX ORDER — OLANZAPINE 10 MG/1
10 INJECTION, POWDER, LYOPHILIZED, FOR SOLUTION INTRAMUSCULAR
Status: DISCONTINUED | OUTPATIENT
Start: 2021-04-20 | End: 2021-04-23 | Stop reason: HOSPADM

## 2021-04-20 RX ORDER — BENZTROPINE MESYLATE 1 MG/1
1 TABLET ORAL
Status: DISCONTINUED | OUTPATIENT
Start: 2021-04-20 | End: 2021-04-23 | Stop reason: HOSPADM

## 2021-04-20 RX ORDER — HYDROXYZINE HYDROCHLORIDE 25 MG/1
50 TABLET, FILM COATED ORAL
Status: DISCONTINUED | OUTPATIENT
Start: 2021-04-20 | End: 2021-04-23 | Stop reason: HOSPADM

## 2021-04-20 RX ORDER — LORAZEPAM 2 MG/ML
2 INJECTION INTRAMUSCULAR EVERY 6 HOURS PRN
Status: DISCONTINUED | OUTPATIENT
Start: 2021-04-20 | End: 2021-04-23 | Stop reason: HOSPADM

## 2021-04-20 RX ORDER — HYDROXYZINE HYDROCHLORIDE 25 MG/1
100 TABLET, FILM COATED ORAL
Status: DISCONTINUED | OUTPATIENT
Start: 2021-04-20 | End: 2021-04-23 | Stop reason: HOSPADM

## 2021-04-20 RX ORDER — HYDROXYZINE HYDROCHLORIDE 25 MG/1
25 TABLET, FILM COATED ORAL
Status: DISCONTINUED | OUTPATIENT
Start: 2021-04-20 | End: 2021-04-23 | Stop reason: HOSPADM

## 2021-04-20 RX ORDER — OLANZAPINE 5 MG/1
5 TABLET ORAL
Status: DISCONTINUED | OUTPATIENT
Start: 2021-04-20 | End: 2021-04-23 | Stop reason: HOSPADM

## 2021-04-20 RX ORDER — OLANZAPINE 2.5 MG/1
2.5 TABLET ORAL
Status: DISCONTINUED | OUTPATIENT
Start: 2021-04-20 | End: 2021-04-23 | Stop reason: HOSPADM

## 2021-04-20 RX ORDER — ACETAMINOPHEN 325 MG/1
975 TABLET ORAL EVERY 6 HOURS PRN
Status: DISCONTINUED | OUTPATIENT
Start: 2021-04-20 | End: 2021-04-23 | Stop reason: HOSPADM

## 2021-04-20 RX ORDER — MULTIVIT WITH MINERALS/LUTEIN
250 TABLET ORAL DAILY
COMMUNITY

## 2021-04-20 RX ORDER — TRAZODONE HYDROCHLORIDE 50 MG/1
50 TABLET ORAL
Status: DISCONTINUED | OUTPATIENT
Start: 2021-04-20 | End: 2021-04-23 | Stop reason: HOSPADM

## 2021-04-20 RX ORDER — ACETAMINOPHEN 325 MG/1
650 TABLET ORAL EVERY 4 HOURS PRN
Status: DISCONTINUED | OUTPATIENT
Start: 2021-04-20 | End: 2021-04-23 | Stop reason: HOSPADM

## 2021-04-20 RX ORDER — OLANZAPINE 10 MG/1
10 TABLET ORAL
Status: DISCONTINUED | OUTPATIENT
Start: 2021-04-20 | End: 2021-04-23 | Stop reason: HOSPADM

## 2021-04-20 RX ORDER — ZINC SULFATE 50(220)MG
220 CAPSULE ORAL DAILY
COMMUNITY

## 2021-04-20 RX ADMIN — HYDROXYZINE HYDROCHLORIDE 50 MG: 25 TABLET, FILM COATED ORAL at 13:01

## 2021-04-20 NOTE — ED NOTES
Patient is accepted at [facility]  Patient is accepted by Adele Ribera, per Ami          Nurse report is to be called to [phone] prior to patient transfer  Unit will call for report when ready for patient

## 2021-04-20 NOTE — PROGRESS NOTES
Patient was admitted with the following belongings   4 pair of socks   4 pr of holister underwear  x2 jeans   x2 sweatpants   Grey pj pants   Green slippers     Green suite case w clothing       Behind desk   Phone  and phone   Keys         Patient signed belonging sheet

## 2021-04-20 NOTE — ED NOTES
Insurance Authorization for admission:   Phone call placed to [facility] number: [phone]  On hold for 1 5 hours, before transferring the call to [address] [place] [address] [address] (Eligibility Verification System) called - [phone]  Automated system indicates: eligible for MA  COB to be completed with [facility]  Insurance Authorization for Transportation:    Not needed       Rashmi Horta LMSW  04/20/21  9012

## 2021-04-20 NOTE — PLAN OF CARE
Problem: Risk for Self Injury/Neglect  Goal: Refrain from harming self  Description: Interventions:  - Monitor patient closely, per order  - Develop a trusting relationship  - Supervise medication ingestion, monitor effects and side effects   Outcome: Progressing

## 2021-04-20 NOTE — ED NOTES
302 and related forms completed  Original placed in Zone 1 RN area per RN direction  Copy and reporting forms retained in Crisis office

## 2021-04-20 NOTE — ED CARE HANDOFF
Emergency Department Sign Out Note        Sign out and transfer of care from Dr Zuleyka Dodd  See Separate Emergency Department note  The patient, Henry Geiger, was evaluated by the previous provider for Agitation  Workup Completed:  Psych labs: Covid, etoh, UDS: all wnl    ED Course / Workup Pending (followup):      00:00  Pt received in sign out  Dr Zuleyka Dodd has medically cleared  Pt is 302 for med management for agitation   He has had no ED issues    Pt has no needed any medications      0620  Pt resting comfortably       0700  Sign out:  Discussed with Dr Rose Mary Wagner  Will follow up on bed placement                           Procedures  MDM    Disposition  Final diagnoses:   Abrasion of scalp, initial encounter   Autism     Time reflects when diagnosis was documented in both MDM as applicable and the Disposition within this note     Time User Action Codes Description Comment    4/19/2021  9:35 PM Byron Wilson Add [S00 01XA] Abrasion of scalp, initial encounter     4/19/2021  9:35 PM Byron Wilson Add [F84 0] Autism       ED Disposition     ED Disposition Condition Date/Time Comment    Transfer to [facility] Apr 19, 2021 10:15 PM Henry Geiger should be evaluated by mental health and has been medically cleared  MD Documentation      Most Recent Value   Sending MD Dr Zander Maruqis    None       Patient's Medications   Discharge Prescriptions    No medications on file     No discharge procedures on file         ED Provider  Electronically Signed by     Beltran Lopez MD  04/20/21 3897

## 2021-04-20 NOTE — ED NOTES
Beto Suicide Risk Assessment deferred, as unable to assess while patient sleeping  Behavioral Health Assessment deferred as patient is sleeping and would benefit from additional rest   Vital signs deferred until patient awake, no signs or symptoms of respiratory distress at this time  Once patient is awake and able to participate, will complete assessments         Jayla Moran RN  04/20/21 7646

## 2021-04-20 NOTE — ED NOTES
Beto Suicide Risk Assessment deferred, as unable to assess while patient sleeping  Behavioral Health Assessment deferred as patient is sleeping and would benefit from additional rest   Vital signs deferred until patient awake, no signs or symptoms of respiratory distress at this time  Once patient is awake and able to participate, will complete assessments         Terra Epps RN  04/20/21 9911

## 2021-04-20 NOTE — ED NOTES
Beto Suicide Risk Assessment deferred, as unable to assess while patient sleeping  Behavioral Health Assessment deferred as patient is sleeping and would benefit from additional rest   Vital signs deferred until patient awake, no signs or symptoms of respiratory distress at this time  Once patient is awake and able to participate, will complete assessments         Jayy Dockery RN  04/20/21 6868

## 2021-04-20 NOTE — ED NOTES
Intake has [address] no appropriate bed   [facility]-no appropriate bed   [facility]-no appropriate beds  [facility] appropriate beds   [facility]-no appropriate beds  [facility]-no beds  [facility]-no beds

## 2021-04-20 NOTE — ED NOTES
Insurance Authorization for admission:   Phone call placed to [facility] number: [phone]  Laisha Zacarias was on hold 1 5 hours, before transferring the call to [facility]   who was on hold for 1 5 hours  Call transfer to me, on hold for 2 hours prior to hanging up  Prior authorization will need to be attempted on 4/21/21     EVS (Eligibility Verification System) called - [phone]  Automated system indicates: eligible for MA  COB was completed with Pablo Mac at [address] [place] [address] for Transportation:    Not needed
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
## 2021-04-20 NOTE — ED NOTES
EVS (Eligibility Verification System) called - [phone]  Automated system indicates: eligible; [address] [place] [address]; recipient #: [id number]  Primary is [employer]  Precert required once bed is located  COB with [address] [place] [address]

## 2021-04-21 PROBLEM — Z00.8 MEDICAL CLEARANCE FOR PSYCHIATRIC ADMISSION: Status: ACTIVE | Noted: 2021-04-21

## 2021-04-21 PROBLEM — F84.0 AUTISM: Status: ACTIVE | Noted: 2021-04-21

## 2021-04-21 LAB
ALBUMIN SERPL BCP-MCNC: 5.4 G/DL (ref 3.5–5.7)
ALP SERPL-CCNC: 60 U/L (ref 40–150)
ALT SERPL W P-5'-P-CCNC: 17 U/L (ref 7–52)
ANION GAP SERPL CALCULATED.3IONS-SCNC: 13 MMOL/L (ref 4–13)
AST SERPL W P-5'-P-CCNC: 17 U/L (ref 13–39)
ATRIAL RATE: 108 BPM
BASOPHILS # BLD AUTO: 0.1 THOUSANDS/ΜL (ref 0–0.1)
BASOPHILS NFR BLD AUTO: 1 % (ref 0–2)
BILIRUB SERPL-MCNC: 0.5 MG/DL (ref 0.2–1)
BUN SERPL-MCNC: 10 MG/DL (ref 7–25)
CALCIUM SERPL-MCNC: 10.4 MG/DL (ref 8.6–10.5)
CHLORIDE SERPL-SCNC: 98 MMOL/L (ref 98–107)
CO2 SERPL-SCNC: 28 MMOL/L (ref 21–31)
CREAT SERPL-MCNC: 0.78 MG/DL (ref 0.7–1.3)
EOSINOPHIL # BLD AUTO: 0.1 THOUSAND/ΜL (ref 0–0.61)
EOSINOPHIL NFR BLD AUTO: 1 % (ref 0–5)
ERYTHROCYTE [DISTWIDTH] IN BLOOD BY AUTOMATED COUNT: 14 % (ref 11.5–14.5)
FLUAV RNA RESP QL NAA+PROBE: NEGATIVE
FLUBV RNA RESP QL NAA+PROBE: NEGATIVE
GFR SERPL CREATININE-BSD FRML MDRD: 129 ML/MIN/1.73SQ M
GLUCOSE SERPL-MCNC: 229 MG/DL (ref 65–99)
GLUCOSE SERPL-MCNC: 232 MG/DL (ref 65–140)
GLUCOSE SERPL-MCNC: 275 MG/DL (ref 65–140)
HCT VFR BLD AUTO: 47.8 % (ref 42–47)
HGB BLD-MCNC: 16.1 G/DL (ref 14–18)
LYMPHOCYTES # BLD AUTO: 2.3 THOUSANDS/ΜL (ref 0.6–4.47)
LYMPHOCYTES NFR BLD AUTO: 29 % (ref 21–51)
MCH RBC QN AUTO: 29.5 PG (ref 26–34)
MCHC RBC AUTO-ENTMCNC: 33.6 G/DL (ref 31–37)
MCV RBC AUTO: 88 FL (ref 81–99)
MONOCYTES # BLD AUTO: 0.6 THOUSAND/ΜL (ref 0.17–1.22)
MONOCYTES NFR BLD AUTO: 7 % (ref 2–12)
NEUTROPHILS # BLD AUTO: 5.1 THOUSANDS/ΜL (ref 1.4–6.5)
NEUTS SEG NFR BLD AUTO: 63 % (ref 42–75)
P AXIS: 81 DEGREES
PLATELET # BLD AUTO: 300 THOUSANDS/UL (ref 149–390)
PMV BLD AUTO: 8 FL (ref 8.6–11.7)
POTASSIUM SERPL-SCNC: 4.3 MMOL/L (ref 3.5–5.5)
PR INTERVAL: 126 MS
PROT SERPL-MCNC: 8.1 G/DL (ref 6.4–8.9)
QRS AXIS: 82 DEGREES
QRSD INTERVAL: 78 MS
QT INTERVAL: 310 MS
QTC INTERVAL: 415 MS
RBC # BLD AUTO: 5.46 MILLION/UL (ref 4.3–5.9)
RSV RNA RESP QL NAA+PROBE: NEGATIVE
SARS-COV-2 RNA RESP QL NAA+PROBE: NEGATIVE
SODIUM SERPL-SCNC: 139 MMOL/L (ref 134–143)
T WAVE AXIS: 82 DEGREES
TSH SERPL DL<=0.05 MIU/L-ACNC: 2.21 UIU/ML (ref 0.45–5.33)
VENTRICULAR RATE: 108 BPM
WBC # BLD AUTO: 8.1 THOUSAND/UL (ref 4.8–10.8)

## 2021-04-21 PROCEDURE — 99253 IP/OBS CNSLTJ NEW/EST LOW 45: CPT | Performed by: HOSPITALIST

## 2021-04-21 PROCEDURE — 93005 ELECTROCARDIOGRAM TRACING: CPT

## 2021-04-21 PROCEDURE — 82948 REAGENT STRIP/BLOOD GLUCOSE: CPT

## 2021-04-21 PROCEDURE — 99252 IP/OBS CONSLTJ NEW/EST SF 35: CPT | Performed by: NURSE PRACTITIONER

## 2021-04-21 PROCEDURE — 84443 ASSAY THYROID STIM HORMONE: CPT | Performed by: NURSE PRACTITIONER

## 2021-04-21 PROCEDURE — 85025 COMPLETE CBC W/AUTO DIFF WBC: CPT | Performed by: NURSE PRACTITIONER

## 2021-04-21 PROCEDURE — 0241U HB NFCT DS VIR RESP RNA 4 TRGT: CPT | Performed by: NURSE PRACTITIONER

## 2021-04-21 PROCEDURE — 93010 ELECTROCARDIOGRAM REPORT: CPT | Performed by: INTERNAL MEDICINE

## 2021-04-21 PROCEDURE — 80053 COMPREHEN METABOLIC PANEL: CPT | Performed by: NURSE PRACTITIONER

## 2021-04-21 RX ORDER — FLUTICASONE PROPIONATE 50 MCG
2 SPRAY, SUSPENSION (ML) NASAL DAILY
Status: DISCONTINUED | OUTPATIENT
Start: 2021-04-21 | End: 2021-04-23 | Stop reason: HOSPADM

## 2021-04-21 RX ORDER — DIVALPROEX SODIUM 250 MG/1
250 TABLET, DELAYED RELEASE ORAL EVERY 12 HOURS SCHEDULED
Status: DISCONTINUED | OUTPATIENT
Start: 2021-04-21 | End: 2021-04-23 | Stop reason: HOSPADM

## 2021-04-21 RX ORDER — LORATADINE 10 MG/1
10 TABLET ORAL DAILY
Status: DISCONTINUED | OUTPATIENT
Start: 2021-04-21 | End: 2021-04-23 | Stop reason: HOSPADM

## 2021-04-21 RX ADMIN — DIVALPROEX SODIUM 250 MG: 250 TABLET, DELAYED RELEASE ORAL at 08:29

## 2021-04-21 RX ADMIN — INSULIN LISPRO 4 UNITS: 100 INJECTION, SOLUTION INTRAVENOUS; SUBCUTANEOUS at 21:21

## 2021-04-21 RX ADMIN — METFORMIN HYDROCHLORIDE 1000 MG: 500 TABLET ORAL at 17:02

## 2021-04-21 RX ADMIN — LORATADINE 10 MG: 10 TABLET ORAL at 15:45

## 2021-04-21 RX ADMIN — INSULIN LISPRO 3 UNITS: 100 INJECTION, SOLUTION INTRAVENOUS; SUBCUTANEOUS at 17:45

## 2021-04-21 RX ADMIN — DIVALPROEX SODIUM 250 MG: 250 TABLET, DELAYED RELEASE ORAL at 21:22

## 2021-04-21 RX ADMIN — FLUTICASONE PROPIONATE 2 SPRAY: 50 SPRAY, METERED NASAL at 17:45

## 2021-04-21 NOTE — NURSING NOTE
Pt received on the unit awake and alert in between the day room, hallway and his room , he denies any depression, anxiety or SI however her stated by writing on his dry eraser board that he wanted to leave , hes bored  Also his love for [employer] since 2009, able to make needs known by writing , presented very pleasant and cooperative, compliant with medications and positive for pm snack  Q7 minute checks continued , will continue to monitor

## 2021-04-21 NOTE — ASSESSMENT & PLAN NOTE
Lab Results   Component Value Date    HGBA1C 5 7 (H) 03/03/2021       No results for input(s): POCGLU in the last 72 hours      Blood Sugar Average: Last 72 hrs:  ·  will place on insulin sliding scales  · Continue with metformin
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
## 2021-04-21 NOTE — PROGRESS NOTES
04/21/21 5213   Activity/Group Checklist   Group   (Goal Planning and Communication )   Attendance Attended   Attendance Duration (min) 31-45   Interactions Interacted appropriately   Affect/Mood Appropriate   Goals Achieved Able to listen to others; Able to engage in interactions

## 2021-04-21 NOTE — PLAN OF CARE
Problem: DISCHARGE PLANNING  Goal: Discharge to home or other facility with appropriate resources  Description: INTERVENTIONS:  - Identify barriers to discharge w/patient and caregiver  - Arrange for needed discharge resources and transportation as appropriate  - Identify discharge learning needs (meds, wound care, etc )  - Arrange for interpretive services to assist at discharge as needed  - Refer to Case Management Department for coordinating discharge planning if the patient needs post-hospital services based on physician/advanced practitioner order or complex needs related to functional status, cognitive ability, or social support system  Outcome: Progressing     Patient cooperated with intake assessment

## 2021-04-21 NOTE — PROGRESS NOTES
Pt is non-verbal & writes his needs on eraser board  Pt is cooperative & compliant with medications  Q 7 min checks maintained to monitor pt's behavior & safety  Pt denies any hallucinations, suicidal or homicidal ideations  Pt denies any depression or anxiety  Pt up ad sameer & gait is steady

## 2021-04-21 NOTE — ASSESSMENT & PLAN NOTE
· His chronic medical issue is currently being addressed and stable  · Blood work reviewed  · The patient is medically cleared to be admitted to [facility]
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
## 2021-04-21 NOTE — ED NOTES
Insurance Authorization for admission:   Phone call placed to [email or web address]  Phone number: [phone]  Crisis was on hold for nearly 4 5 hrs without success  Crisis attempted to complete the precert by calling [phone], however, after entering the Patient's ID several times the automated message stated the ID could not be found each time
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
## 2021-04-21 NOTE — PROGRESS NOTES
04/21/21 1000   Activity/Group Checklist   Group   (Self Reflection and Art Therapy Processing)   Attendance Attended   Attendance Duration (min) Greater than 60   Interactions Interacted appropriately   Affect/Mood Appropriate;Calm   Goals Achieved Identified feelings; Discussed coping strategies; Able to listen to others; Able to engage in interactions

## 2021-04-22 LAB
GLUCOSE SERPL-MCNC: 114 MG/DL (ref 65–140)
GLUCOSE SERPL-MCNC: 150 MG/DL (ref 65–140)
GLUCOSE SERPL-MCNC: 153 MG/DL (ref 65–140)
GLUCOSE SERPL-MCNC: 171 MG/DL (ref 65–140)

## 2021-04-22 PROCEDURE — 99232 SBSQ HOSP IP/OBS MODERATE 35: CPT | Performed by: NURSE PRACTITIONER

## 2021-04-22 PROCEDURE — 82948 REAGENT STRIP/BLOOD GLUCOSE: CPT

## 2021-04-22 RX ADMIN — DIVALPROEX SODIUM 250 MG: 250 TABLET, DELAYED RELEASE ORAL at 08:10

## 2021-04-22 RX ADMIN — FLUTICASONE PROPIONATE 2 SPRAY: 50 SPRAY, METERED NASAL at 11:06

## 2021-04-22 RX ADMIN — METFORMIN HYDROCHLORIDE 1000 MG: 500 TABLET ORAL at 17:12

## 2021-04-22 RX ADMIN — METFORMIN HYDROCHLORIDE 1000 MG: 500 TABLET ORAL at 08:10

## 2021-04-22 RX ADMIN — DIVALPROEX SODIUM 250 MG: 250 TABLET, DELAYED RELEASE ORAL at 21:24

## 2021-04-22 RX ADMIN — LORATADINE 10 MG: 10 TABLET ORAL at 08:10

## 2021-04-22 NOTE — PROGRESS NOTES
04/22/21 0752   Team Meeting   Meeting Type Daily Rounds   Initial Conference Date 04/22/21   Team Members Present   Team Members Present Physician;Nurse;   Physician Team Member Dr Sameer Aguilera; Lupe Smith[address]   Nursing Team Member Sudheer Camacho, RONNI   Social Work Team Member Jenny Mart [place]   Patient/Family Present   Patient Present No   Patient's Family Present No     302, with need to initiate 303 today  He seems capable of signing 201, but he might sign 72-hr  Answered SW's questions appropriately  No behaviors  No med changes

## 2021-04-22 NOTE — PROGRESS NOTES
Progress Note - Behavioral Health   Carol Saldaña 23 y o  male MRN: [id number]  Unit/Bed#: -01 Encounter: [id number]    This note was not shared with the patient due to reasonable likelihood of causing patient harm     The patient was seen for continuing care and reviewed with treatment team   Patient was observed sitting at bedside shows she which dry erase board and marker she shared he was pleasant and cooperative upon approach, displayed good eye contact throughout the encounter, with use and appropriate affect noted  He is currently denying any depressive or anxiety symptoms  He denies any suicidal/homicidal ideation use she will perceptual disturbances and did not appear to be responding to internal stimuli at time of encounter  He is reporting adequate he appetite and energy level  Reports decreased sleep she which he attributes to uncomfortable beds  He is compliant with his medications and feels that they are effective  No side effects noted at this time  No agitation noted  Patient is hopeful for discharge soon      Mental Status Evaluation:  Appearance:  Adequate hygiene and grooming   Behavior:  cooperative and friendly   Mood:  euthymic   Affect: mood-congruent   Speech: Mute/refuses to talk   Thought Process:  Goal directed and coherent   Thought Content:  Does not verbalize delusional material   Perceptual Disturbances: Denies hallucinations and does not appear to be responding to internal stimuli   Risk Potential: No suicidal or homicidal ideation   Attention/Concentration attention span and concentration were age appropriate   Orientation:   Alert and awake   Gait/Station: Not observed   Motor Activity: No abnormal movement noted     Progress Toward Goals: continues to improve    Assessment/Plan    Principal Problem:    Intermittent explosive disorder  Active Problems:    Seasonal allergies    Type 2 diabetes mellitus without complication, without long-term current use of insulin [facility])    Autism    Medical clearance for psychiatric admission      Recommended Treatment:   1  Continue with pharmacotherapy, group therapy, milieu therapy and occupational therapy  2  Continue with safety checks per unit protocol should  3  She continue with Depakote monitoring  Blood draw scheduled for tomorrow morning  4  He medication changes at this time    The patient will be maintained on the following medications:    Current Facility-Administered Medications   Medication Dose Route Frequency Provider Last Rate    acetaminophen  650 mg Oral Q6H PRN Len Gianni Lodics, CRNP      acetaminophen  650 mg Oral Q4H PRN Len Gianni Lodics, CRNP      acetaminophen  975 mg Oral Q6H PRN Len Gianni Lodics, CRNP      benztropine  1 mg Oral Q4H PRN Max 6/day Merle N Lodics, CRNP      hydrOXYzine HCL  50 mg Oral Q6H PRN Max 4/day Merle N Lodics, CRNP      Or    diphenhydrAMINE  50 mg Intramuscular Q6H PRN Merle N Lodics, CRNP      divalproex sodium  250 mg Oral Q12H [address] Miguel Demarco MD      fluticasone  2 spray Nasal Daily CELSO Maldonado      hydrOXYzine HCL  100 mg Oral Q6H PRN Max 4/day Merle N Lodics, YOUSUFNP      Or    LORazepam  2 mg Intramuscular Q6H PRN Merle N Lodics, CRNP      hydrOXYzine HCL  25 mg Oral Q6H PRN Max 4/day Merle N Kennethics, CRNP      insulin lispro  1-6 Units Subcutaneous HS CELSO Maldonado      insulin lispro  1-6 Units Subcutaneous TID AC CELSO Maldonado      loratadine  10 mg Oral Daily CELSO Maldonado      metFORMIN  1,000 mg Oral BID CELSO Maldonado      OLANZapine  10 mg Oral Q3H PRN Max 3/day Merle N Lodics, YOUSUFNP      Or    OLANZapine  10 mg Intramuscular Q3H PRN Max 3/day Merle N Lodics, CRNP      OLANZapine  5 mg Oral Q3H PRN Max 6/day Merle N Lodics, CRNP      Or    OLANZapine  5 mg Intramuscular Q3H PRN Max 6/day Merle N Lodics, CRNP      OLANZapine  2 5 mg Oral Q3H PRN Max 8/day Len Giannivalentina Francis, CRNP  traZODone  50 mg Oral HS PRN CELSO Flores         Risks, benefits and possible side effects of Medications:   Risks, benefits, and possible side effects of medications explained to patient and patient verbalizes understanding  Portions of the record may have been created with voice recognition software  Occasional wrong word or "sound a like" substitutions may have occurred due to the inherent limitations of voice recognition software  Read the chart carefully and recognize, using context, where substitutions have occurred

## 2021-04-22 NOTE — SOCIAL WORK
CM arranged for patient to have 303 commitment hearing tomorrow morning at 10 AM  CM will continue to follow patient's progress and assist with discharge planning needs

## 2021-04-22 NOTE — NURSING NOTE
Pt observed in his bedroom at time of assessment  Has occasionally walked in hallways  Pt communicates w/ writing on white board  Refused insulin coverage writing "I will watch my carbs" and "I don't want to use insulin I would like to continue taking my Metformin"  Ate 100% of breakfast + lunch tray  Denies anxiety and depression but identifies missing his family as a stressor - he writes "I would like to find out if I could go home tomorrow"  Will [facility]  Q7 minute safety checks in progress

## 2021-04-22 NOTE — PROGRESS NOTES
04/22/21 1030   Activity/Group Checklist   Group   (Creative Expressions and Processing)   Attendance Attended   Attendance Duration (min) 31-45   Interactions Interacted appropriately   Affect/Mood Appropriate;Calm   Goals Achieved Identified feelings; Discussed coping strategies; Able to listen to others; Able to engage in interactions

## 2021-04-22 NOTE — SOCIAL WORK
CM received phone call from patient's mother, Deisi Ojeda, who called to discuss patient's blood sugars further  Deisi Ojeda reports that she feels patient's blood sugars are elevated either because of the Depakote or the Claritin as those are the only new medications for him  Deisi Ojeda notes that patient's blood sugars are sensitive to other medications he is prescribed so the providers will need to be careful with adding and increasing medications  The other thought Deisi Ojeda has is that here at the hospital patient may be eating 3 meals a day instead of 2 a day like he does at home  CM will forward this information to treatment team  CM will continue to follow patient' progress and assist with discharge planning needs

## 2021-04-22 NOTE — NURSING NOTE
Pt received on the unit awake and alert in his bedroom, occasionally in the halls , he denies any depression, anxiety or SI, able to make needs known, Pt states by writing that he's ready to go home he misses his dogs, he also states that he's not a big eater and would only like breakfast and dinner which is how he maintains his blood sugar, pt mentioned not being on insulin prior to being admitted and only wants the metformin  pleasant and cooperative, compliant with medications and negative for pm snack  Q7 minute checks continued , will continue to monitor

## 2021-04-22 NOTE — SOCIAL WORK
CM called the referral intake at [facility] and spoke with Cornelia Floyd confirmed that patient does not currently have any open services with them and then forwarded CM to the DS , Megan Starks (ext  [phone])  CM left Megan Starks a VM requesting call back to discuss potential resources that may be available to the patient in the community  CM will continue to follow patient's progress and assist with discharge planning needs

## 2021-04-22 NOTE — SOCIAL WORK
CM received phone call from patient's mother, Mesfin Mcleod, requesting update on patient  CM provided her an update on how patient's evening went  CM also informed her that the provider will pursue a 303 commitment for patient tomorrow  CM informed Mesfin Mcleod that she would be notified of the results of the hearing tomorrow afternoon  Mesfin Mcleod did note that patient will likely be very upset when he learns that he will not be discharging home tomorrow or Saturday as it will interfere with his ability to watch the [place] race on Sunday  PIETER informed Mesfin Mcleod that [facility] staff can be made aware that he will want to watch the race on Sunday and staff can try to get him some TV time to watch it  CM will continue to follow patient's progress and assist with discharge planning needs

## 2021-04-22 NOTE — PROGRESS NOTES
04/22/21 0797   Activity/Group Checklist   Group   (Goal Planning and Communication )   Attendance Attended   Attendance Duration (min) 46-60   Interactions Interacted appropriately   Affect/Mood Appropriate;Calm   Goals Achieved Identified feelings; Discussed coping strategies; Able to listen to others; Able to engage in interactions

## 2021-04-22 NOTE — PLAN OF CARE
Problem: Ineffective Coping  Goal: Cooperates with admission process  Description: Interventions:   - Complete admission process  Outcome: Progressing  Goal: Identifies ineffective coping skills  Outcome: Progressing  Goal: Identifies healthy coping skills  Outcome: Progressing  Goal: Demonstrates healthy coping skills  Outcome: Progressing  Goal: Participates in unit activities  Description: Interventions:  - Provide therapeutic environment   - Provide required programming   - Redirect inappropriate behaviors   Outcome: Progressing  Goal: Patient/Family participate in treatment and DC plans  Description: Interventions:  - Provide therapeutic environment  Outcome: Progressing  Goal: Patient/Family verbalizes awareness of resources  Outcome: Progressing  Goal: Understands least restrictive measures  Description: Interventions:  - Utilize least restrictive behavior  Outcome: Progressing  Goal: Free from restraint events  Description: - Utilize least restrictive measures   - Provide behavioral interventions   - Redirect inappropriate behaviors   Outcome: Progressing     Problem: Risk for Self Injury/Neglect  Goal: Treatment Goal: Remain safe during length of stay, learn and adopt new coping skills, and be free of self-injurious ideation, impulses and acts at the time of discharge  Outcome: Progressing  Goal: Verbalize thoughts and feelings  Description: Interventions:  - Assess and re-assess patient's lethality and potential for self-injury  - Engage patient in 1:1 interactions, daily, for a minimum of 15 minutes  - Encourage patient to express feelings, fears, frustrations, hopes  - Establish rapport/trust with patient   Outcome: Progressing  Goal: Refrain from harming self  Description: Interventions:  - Monitor patient closely, per order  - Develop a trusting relationship  - Supervise medication ingestion, monitor effects and side effects   Outcome: Progressing  Goal: Recognize maladaptive responses and adopt new coping mechanisms  Outcome: Progressing  Goal: Complete daily ADLs, including personal hygiene independently, as able  Description: Interventions:  - Observe, teach, and assist patient with ADLS  - Monitor and promote a balance of rest/activity, with adequate nutrition and elimination  Outcome: Progressing     Problem: Depression  Goal: Treatment Goal: Demonstrate behavioral control of depressive symptoms, verbalize feelings of improved mood/affect, and adopt new coping skills prior to discharge  Outcome: Progressing  Goal: Refrain from isolation  Description: Interventions:  - Develop a trusting relationship   - Encourage socialization   Outcome: Progressing  Goal: Refrain from self-neglect  Outcome: Progressing     Problem: Anxiety  Goal: Anxiety is at manageable level  Description: Interventions:  - Assess and monitor patient's anxiety level  - Monitor for signs and symptoms (heart palpitations, chest pain, shortness of breath, headaches, nausea, feeling jumpy, restlessness, irritable, apprehensive)  - Collaborate with interdisciplinary team and initiate plan and interventions as ordered    - [place] patient to unit/surroundings  - Explain treatment plan  - Encourage participation in care  - Encourage verbalization of concerns/fears  - Identify coping mechanisms  - Assist in developing anxiety-reducing skills  - Administer/offer alternative therapies  - Limit or eliminate stimulants  Outcome: Progressing     Problem: Risk for Violence/Aggression Toward Others  Goal: Treatment Goal: Refrain from acts of violence/aggression during length of stay, and demonstrate improved impulse control at the time of discharge  Outcome: Progressing  Goal: Refrain from harming others  Outcome: Progressing  Goal: Refrain from destructive acts on the environment or property  Outcome: Progressing  Goal: Control angry outbursts  Description: Interventions:  - Monitor patient closely, per order  - Ensure early verbal de-escalation  - Monitor prn medication needs  - Set reasonable/therapeutic limits, outline behavioral expectations, and consequences   - Provide a non-threatening milieu, utilizing the least restrictive interventions   Outcome: Progressing  Goal: Identify appropriate positive anger management techniques  Description: Interventions:  - Offer anger management and coping skills groups   - Staff will provide positive feedback for appropriate anger control  Outcome: Progressing

## 2021-04-23 ENCOUNTER — TRANSITIONAL CARE MANAGEMENT (OUTPATIENT)
Dept: FAMILY MEDICINE CLINIC | Facility: CLINIC | Age: 21
End: 2021-04-23

## 2021-04-23 VITALS
BODY MASS INDEX: 26.52 KG/M2 | HEART RATE: 107 BPM | OXYGEN SATURATION: 98 % | TEMPERATURE: 98.7 F | HEIGHT: 66 IN | RESPIRATION RATE: 18 BRPM | DIASTOLIC BLOOD PRESSURE: 75 MMHG | WEIGHT: 165 LBS | SYSTOLIC BLOOD PRESSURE: 132 MMHG

## 2021-04-23 PROBLEM — Z00.8 MEDICAL CLEARANCE FOR PSYCHIATRIC ADMISSION: Status: RESOLVED | Noted: 2021-04-21 | Resolved: 2021-04-23

## 2021-04-23 PROBLEM — Z00.01 ENCOUNTER FOR GENERAL ADULT MEDICAL EXAMINATION WITH ABNORMAL FINDINGS: Status: RESOLVED | Noted: 2020-02-26 | Resolved: 2021-04-23

## 2021-04-23 LAB
GLUCOSE SERPL-MCNC: 126 MG/DL (ref 65–140)
GLUCOSE SERPL-MCNC: 80 MG/DL (ref 65–140)
VALPROATE SERPL-MCNC: 35.6 UG/ML (ref 50–125)

## 2021-04-23 PROCEDURE — 82948 REAGENT STRIP/BLOOD GLUCOSE: CPT

## 2021-04-23 PROCEDURE — 80164 ASSAY DIPROPYLACETIC ACD TOT: CPT | Performed by: NURSE PRACTITIONER

## 2021-04-23 PROCEDURE — 99238 HOSP IP/OBS DSCHRG MGMT 30/<: CPT | Performed by: HOSPITALIST

## 2021-04-23 RX ORDER — DIVALPROEX SODIUM 250 MG/1
250 TABLET, DELAYED RELEASE ORAL EVERY 12 HOURS SCHEDULED
Qty: 60 TABLET | Refills: 0 | Status: SHIPPED | OUTPATIENT
Start: 2021-04-23 | End: 2021-08-16 | Stop reason: ALTCHOICE

## 2021-04-23 RX ADMIN — DIVALPROEX SODIUM 250 MG: 250 TABLET, DELAYED RELEASE ORAL at 08:54

## 2021-04-23 RX ADMIN — FLUTICASONE PROPIONATE 2 SPRAY: 50 SPRAY, METERED NASAL at 08:59

## 2021-04-23 RX ADMIN — METFORMIN HYDROCHLORIDE 1000 MG: 500 TABLET ORAL at 08:54

## 2021-04-23 RX ADMIN — LORATADINE 10 MG: 10 TABLET ORAL at 08:54

## 2021-04-23 NOTE — BH TRANSITION RECORD
Contact Information: If you have any questions, concerns, pended studies, tests and/or procedures, or emergencies regarding your inpatient behavioral health visit  Please contact [facility] [phone] and ask to speak to a , nurse or physician  A contact is available 24 hours/ 7 days a week at this number  Summary of Procedures Performed During your Stay:  Below is a list of major procedures performed during your hospital stay and a summary of results:  - No major procedures performed  Pending Studies (From admission, onward)     Start     Ordered    04/26/21 0900  Novel Coronavirus (Covid-19),PCR [facility]  Once     Question Answer Comment   Is this test for diagnosis or screening? Screening    Symptomatic for COVID-19 as defined by CDC? No    Hospitalized for COVID-19? No    Admitted to ICU for COVID-19? No    Is this the first test you have had for covid-19? No    Does the patient currently work in a healthcare setting with direct patient contact? No    Is this a [facility] employee? No    Resident in a congregate care setting? No        04/22/21 8247              If studies are pending at discharge, follow up with your PCP and/or referring provider

## 2021-04-23 NOTE — PROGRESS NOTES
Belongings gone through with PT    Pt leaving with the following items    4 pairs of socks  4 pairs of boxers  2 pairs of jeans  2 sweatpants  Grey pj pants   green slippers    Green suitcase with clothes    Phone   Phone [phone]

## 2021-04-23 NOTE — PROGRESS NOTES
Patient awake in room  Pleasant with good eye contact  Medication compliant  Patient uses white broad to communicate  Appetite good  BS WNl no coverage required   Denies anxiety,depression, hallucination, HI, SI  Continue on safety checks

## 2021-04-23 NOTE — DISCHARGE INSTRUCTIONS
Valproic Acid (Depakene, Depakote Sprinkles, Stavzor) - (By mouth)   Why this medicine is used:   Treats seizures  Also treats bipolar disorder and helps prevent migraine headaches  Contact a nurse or doctor right away if you have:  · Blistering, peeling, or red skin rash, tiny red dots on the skin, especially on the lower legs  · Thoughts of hurting yourself, depression, unusual changes in behavior or moods  · Confusion, problems with memory, unusual drowsiness, clumsiness  · Unusual bleeding, bruising, or weakness  · Dark urine or pale stools, loss of appetite, stomach pain, yellow skin or eyes  · Fever, rash, swollen glands in the neck, armpits, or groin     Common side effects:  · Diarrhea, mild stomach pain or upset  · Vision changes, dizziness, headache, tremor, trouble sleeping, hair loss  © Copyright [employer] Information is for End User's use only and may not be sold, redistributed or otherwise used for commercial purposes  Valproic Acid (Depakene, Depakote Sprinkles, Stavzor) - (By mouth)   Why this medicine is used:   Treats seizures  Also treats bipolar disorder and helps prevent migraine headaches    Contact a nurse or doctor right away if you have:  · Blistering, peeling, or red skin rash, tiny red dots on the skin, especially on the lower legs  · Thoughts of hurting yourself, depression, unusual changes in behavior or moods  · Confusion, problems with memory, unusual drowsiness, clumsiness  · Unusual bleeding, bruising, or weakness  · Dark urine or pale stools, loss of appetite, stomach pain, yellow skin or eyes  · Fever, rash, swollen glands in the neck, armpits, or groin     Common side effects:  · Diarrhea, mild stomach pain or upset  · Vision changes, dizziness, headache, tremor, trouble sleeping, hair loss  © Copyright [employer] Information is for End User's use only and may not be sold, redistributed or otherwise used for commercial purposes  Metabolic Syndrome X   WHAT YOU NEED TO KNOW:   Metabolic syndrome is a group of medical conditions that can lead to heart disease, stroke, or type 2 diabetes  The medical conditions include high triglycerides, low HDL cholesterol, high blood pressure, high blood sugar levels, and extra abdominal fat  DISCHARGE INSTRUCTIONS:   Medicines:   · Blood pressure medicine: This is given to lower your blood pressure  A controlled blood pressure helps protect your organs, such as your heart, lungs, brain, and kidneys  Take your blood pressure medicine exactly as directed  · Cholesterol medicine: This type of medicine is given to help decrease (lower) the amount of cholesterol (fat) in your blood  Cholesterol medicine works best if you also exercise and eat a healthy diet that is low in certain kinds of fats  Some cholesterol medicines may cause liver problems  You may need to have blood taken for tests while using this medicine  · Take your medicine as directed  Contact your healthcare provider if you think your medicine is not helping or if you have side effects  Tell him or her if you are allergic to any medicine  Keep a list of the medicines, vitamins, and herbs you take  Include the amounts, and when and why you take them  Bring the list or the pill bottles to follow-up visits  Carry your medicine list with you in case of an emergency  · Hypoglycemic medicine: This medicine may be given to decrease the amount of sugar in your blood  Hypoglycemic medicine helps your body move the sugar to your cells, where it is needed for energy  Follow up with your healthcare provider as directed:  Write down your questions so you remember to ask them during your visits  Manage metabolic syndrome:   · Maintain a healthy weight:  Weight loss helps lower cholesterol, triglycerides, blood pressure, and blood glucose levels  It can also raise HDL (good cholesterol)   Ask your healthcare provider how much you should weigh  Ask him to help you create a weight loss plan if you are overweight  · Eat a variety of healthy foods:  Healthy foods include fruits, vegetables, whole-grain breads, low-fat dairy products, beans, lean meats, and fish  Eat foods that are low in fat and sodium (salt)  A dietitian can help you plan healthy meals  · Exercise:  Ask your healthcare provider to help you create an exercise plan  Exercise can help lower your blood pressure, cholesterol, and blood sugar levels  Exercise can also help raise your HDL level and help you to lose weight  Get at least 30 minutes of exercise, 5 days each week  · Check your blood pressure as directed: You may be asked to keep a record of your blood pressure and bring it with you to follow-up visits  Ask your healthcare provider what your blood pressure should be and how to check it  · Limit alcohol:  Women should limit alcohol to 1 drink a day  Men should limit alcohol to 2 drinks a day  A drink of alcohol is 12 ounces of beer, 5 ounces of wine, or 1½ ounces of liquor  · Do not smoke: If you smoke, it is never too late to quit  Smoking further increases your risk for heart disease and stroke  Ask your healthcare provider for information if you need help quitting  Contact your healthcare provider if:   · You have more thirst or hunger than usual      · You urinate more frequently  · You have blurred vision  · You have questions or concerns about your condition or care  Seek care immediately or call 911 if:   · Your blood pressure is higher than healthcare providers told it should be  · You have chest pain or discomfort that spreads to your arms, jaw, or back  · You have a severe headache or dizziness  · You have trouble thinking, speaking, or understanding others  © Copyright [address] Information is for End User's use only and may not be sold, redistributed or otherwise used for commercial purposes   All illustrations and images included in CareNotes® are the copyrighted property of [employer] D A M [employer] Inc  or Janice Irvin  The above information is an  only  It is not intended as medical advice for individual conditions or treatments  Talk to your doctor, nurse or pharmacist before following any medical regimen to see if it is safe and effective for you

## 2021-04-23 NOTE — DISCHARGE INSTR - APPOINTMENTS
A referral has been made on your behalf for medication management with [facility] [address] [address]  [facility], [address] [place] [address], [address], [place], [place], [address]  Phone: [phone]  Your appointment is scheduled for Wednesday, April 28, 2021 at 12:15 PM with Dr Jade Pantoja  Please plan to arrive 15 minutes prior to your scheduled appointment, bring your insurance card(s) and photo ID  Your discharge will be faxed to this provider for continuity of care  The treatment team recommends psychiatric treatment upon discharge; you declined a referral at this time  If you change your mind and would like to pursue psychiatric treatment in the community, please reach out to one of the following psychiatric treatment centers: [facility]:  [place] [address]  Phone: [phone]  Fax: [address] [place] [address]  [id number] [place], [place] [address], [address]  Phone: [phone]  Fax: [address] [facility]  [address]  [address] [place] [address]  Phone: [phone]  Fax: [phone]    Dr Marcus CONNELLY D [facility], [facility]  Phone: [phone]  Fax:  [phone]  ~~~~~~~~~~~~~~~~~~~~~~~~~~~~~~~~~~~~~~~~~~~~~~~~~~~~~~~~~~~~~~~~~~~~~    If you are interested in pursuing ID/Autism waiver services for yourself, please begin the referral process by calling Kirby Bauman with [facility] ([facility]) at [phone] and complete the intake process with her  Karolyn Mclaughlin RN, our [employer], will be calling you after your discharge, on the phone number that you provided  She will be available as an additional support, if needed  If you wish to speak with her, you may contact Nadeen Monge at [phone]

## 2021-04-23 NOTE — PROGRESS NOTES
04/23/21 1000   Legal Information   Current Status: 303  (CC (exp  05/13/21))   Legal Documentation Status Filed     Hearing Documentation    303 hearing held today;  in attendance:  Cornelius Santoro - ;  Carlos Jiang [address] PD office; staff psych; ;  pt declined attendance;  [phone] recommendation upheld for up to an additional 20 days of inpt treatment at [facility];  303 expires: May 13, 2021

## 2021-04-23 NOTE — PROGRESS NOTES
Patient visible on the unit  Calm and cooperative  Refused insulin coverage at bedtime  Patient feels he is being discharged Friday of Saturday  No behaviors  Will continue to monitor and access  Q 7 minute checks maintained
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
## 2021-04-23 NOTE — DISCHARGE INSTR - OTHER ORDERS
You are being discharged to your home with your parents located at [address] [place] [address], [address] [place] [address] (# [phone])  Triggers you have identified during your hospitalization that led to your admission was increased aggression and agitation  Coping skills you have identified during your hospitalization include watching NASCAR and playing racing games on playstation  If you are unable to deal with your distressed mood alone please contact your parents, Richard Pacheco and Abby Marcelo, at [phone]  If that is not effective and you continue to have suicidal ideations and/or are in a distressed mood please contact [address] [employer] [employer] [phone], dial [phone] or go to the nearest emergency center  Crisis Information:  [address] Crisis Hotline: 569.936.5439  *Alcohol Anonymous: 224.796.9230  *[place] [facility] Commission: (253) 160-7862  *National Suicide Prevention Lifeline:  0-221.163.3064  [address] [facility]  on Mental Illness ([place]) HELPLINE: 525.844.6637/Website: www jameel org  *Substance Abuse and [address]) [employer], which is a confidential, free, 24-hour-a-day, 365-day-a-year, information service for individuals and family members facing mental health and/or substance use disorders  This service provides referrals to local treatment facilities, support groups, and community-based organizations  Callers can also order free publications and other information  Call 5-414.244.4551/Website: www [facility] gov  *[facility] 2-1-1: This is a toll free, confidential, 24-hour-a-day service which connects you to a community  in your area who can help you find services and resources that are available to you locally and provide critical services that can improve and save lives  Call: 80  /Website: https://shanThinkrkayley rodriguez/        What you need to know aboutcoronavirus disease 2019 (COVID-19)      What is coronavirus disease 2019 (COVID-19)? Coronavirus disease 2019 (COVID-19) is a respiratory illness that can spread from person to person  The virus that causes COVID-19 is a novel coronavirus that was first identified during an investigation into an outbreak in [place], [place]  Can people in the [place]  get COVID-19? Yes  COVID-19 is spreading from person to person in parts of the [place] [facility]  Risk of infection with COVID-19 is higher for people who are close contacts of someone known to have COVID-19, for example healthcare workers, or household members  Other people at higher risk for infection are those who live in or have recently been in an area with ongoing spread of COVID-19  Learn more about places with ongoing spread at   [facility]  html#geographic  Have there been cases of COVID-19 in the [place] ?   Yes  The first case of COVID-19 in the [place] was reported on January 21, 2020  The current count of cases of COVID-19 in the [place] is available on [employer] at [facility]  How does COVID-19 spread? The virus that causes COVID-19 probably emerged from an animal source, but is now spreading from person to person  The virus is thought to spread mainly between people who are in close contact with one another (within about 6 feet) through respiratory droplets produced when an infected person coughs or sneezes  It also may be possible that a person can get COVID-19 by touching a surface or object that has the virus on it and then touching their own mouth, nose, or possibly their eyes, but this is not thought to be the main way the virus spreads  Learn what is known about the spread of newly emerged coronaviruses at [facility]  What are the symptoms of COVID-19?   Patients with COVID-19 have had mild to severe respiratory illness with symptoms of  1  fever  2  cough  3  shortness of breath  What are severe complications from this virus? Some patients have pneumonia in both lungs, multi-organ failure and in some cases death  How can I help protect myself? People can help protect themselves from respiratory illness with everyday preventive actions  1  Avoid close contact with people who are sick  2  Avoid touching your eyes, nose, and mouth withunwashed hands  3  Wash your hands often with soap and water for at least 20 seconds  Use an alcohol-based hand  that contains at least 60% alcohol if soap and water are not available  If you are sick, to keep from spreading respiratory illness to others, you should  1  Stay home when you are sick  2  Cover your cough or sneeze with a tissue, then throw the tissue in the trash  3  Clean and disinfect frequently touched objectsand surfaces  What should I do if I recently traveled from an area with ongoing spread of COVID-19? If you have traveled from an affected area, there may be restrictions on your movements for up to 2 weeks  If you develop symptoms during that period (fever, cough, trouble breathing), seek medical advice  Call the office of your health care provider before you go, and tell them about your travel and your symptoms  They will give you instructions on how to get care without exposing other people to your illness  While sick, avoid contact with people, don't go out and delay any travel to reduce the possibility of spreading illness to others  Is there a vaccine? There is currently no vaccine to protect against COVID-19  The best way to prevent infection is to take everyday preventive actions, like avoiding close contact with people who are sick and washing your hands often  Is there a treatment? There is no specific antiviral treatment for COVID-19  People with COVID-19 can seek medical care to helprelieve symptoms    For more information: www cdc gov/XJQPG11KN 634580-Y 03/03/2020         What to do if you are sick withcoronavirus disease 2019 (COVID-19)      If you are sick with COVID-19 or suspect you are infected with the virus that causes COVID-19, follow the steps below to help prevent the disease from spreading to people in your home and community  Stay home except to get medical care   You should restrict activities outside your home, except for getting medical care  Do not go to work, school, or public areas  Avoid using public transportation, ride-sharing, or taxis  Separate yourself from other people and animals inyour home  People: As much as possible, you should stay in a specific room and away from other people in your home  Also, you should use a separate bathroom, if available  Animals: Do not handle pets or other animals while sick  See COVID-19 and Animals for more information  Call ahead before visiting your doctor   If you have a medical appointment, call the healthcare provider and tell them that you have or may have COVID-19  This will help the healthcare provider's office take steps to keep other people from getting infected or exposed  Wear a facemask  You should wear a facemask when you are around other people (e g , sharing a room or vehicle) or pets and before you enter a healthcare provider's office  If you are not able to wear a facemask (for example, because it causes trouble breathing), then people who live with you should not stay in the same room with you, or they should wear a facemask if they enteryour room  Cover your coughs and sneezes   Cover your mouth and nose with a tissue when you cough or sneeze  Throw used tissues in a lined trash can; immediately wash your hands with soap and water for at least 20 seconds or clean your hands with an alcohol-based hand  that contains at least 60 to 95% alcohol, covering all surfaces of your hands and rubbing them together until they feel dry  Soap and water should be used preferentially if hands are visibly dirty    Avoid sharing personal household items   You should not share dishes, drinking glasses, cups, eating utensils, towels, or bedding with other people or pets in your home  After using these items, they should be washed thoroughly with soap and water  Clean your hands often  Wash your hands often with soap and water for at least 20 seconds  If soap and water are not available, clean your hands with an alcohol-based hand  that contains at least 60% alcohol, covering all surfaces of your hands and rubbing them together until they feel dry  Soap and water should be used preferentially if hands are visibly dirty  Avoid touching your eyes, nose, and mouth with unwashed hands  Clean all "high-touch" surfaces every day  High touch surfaces include counters, tabletops, doorknobs, bathroom fixtures, toilets, phones, keyboards, tablets, and bedside tables  Also, clean any surfaces that may have blood, stool, or body fluids on them  Use a household cleaning spray or wipe, according to the label instructions  Labels contain instructions for safe and effective use of the cleaning product including precautions you should take when applying the product, such as wearing gloves and making sure you have good ventilation during use of the product  Monitor your symptoms  Seek prompt medical attention if your illness is worsening (e g , difficulty breathing)  Before seeking care, call your healthcare provider and tell them that you have, or are being evaluated for, COVID-19  Put on a facemask before you enter the facility  These steps will help the healthcare provider's office to keep other people in the office or waiting room from getting infectedor exposed  Ask your healthcare provider to call the local or [facility] health department  Persons who are placed under active monitoring or facilitated self-monitoring should follow instructions provided by their local health department or occupational health professionals, as appropriate  If you have a medical emergency and need to call 911, notify the dispatch personnel that you have, or are being evaluated for COVID-19  If possible, put on a facemask before emergency medical services arrive  Discontinuing home isolation  Patients with confirmed COVID-19 should remain under home isolation precautions until the risk of secondary transmission to others is thought to be low  The decision to discontinue home isolation precautions should be made on a case-by-case basis, in consultation with healthcare providers and state and local health departments  For more information: www cdc gov/YTJNA98TS 855702-D 02/24/2020         Stay home when you are sick,except to get medical care  Wash your hands often with soap and water for at least 20 seconds  Cover your cough or sneeze with a tissue, then throw the tissue in the trash  Clean and disinfect frequently touched objects and surfaces  Avoid touching your eyes, nose, and mouth  STOP THE SPREAD OF GERMS  For more information: www cdc gov/COVID19 Avoid close contact with people who are sick  Help prevent the spread of respiratory diseases like COVID-19  [address]   Do you feel     Stressed?  Overwhelmed?  Alone?  Afraid?  Anxious? Get connected with a FREE crisis counselor  During these uncertain times, you are not alone  We are here to listen  Please call our [facility] and Referral Line: [phone] TTY: [phone]     There are trained professionals available 24/7 ready to help you navigate these unprecedented challenges  These services are FREE & CONFIDENTIAL  This publication was made possible by Adina Lopez Number [id number], in collaboration with the [address] [place] [address]

## 2021-04-23 NOTE — SOCIAL WORK
CM received phone call from patient's mother, Kiran Nicolas, who reported that she and her  discussed patient's treatment last night and have decided they would like patient to not have any psychiatric medications  Kiran Nicolas reported they would like to look into getting patient on medical marijuana as they feel patient will have less side effects and he already responds well to CBD oil  PIETER then spoke with the provider who reported if that is the family's plan she is agreeable patient being discharged home today  CM notified [facility]  Staff and called patient's mother back requesting she pick patient up at 1430 this afternoon  CM will continue to follow patient's progress and assist with discharge planning needs

## 2021-04-23 NOTE — PROGRESS NOTES
04/23/21 0800   Team Meeting   Meeting Type Daily Rounds   Initial Conference Date 04/23/21   Team Members Present   Team Members Present Physician;Nurse;   Physician Team Member Dr Lindsey Lancaster; Дмитрий Haywood, [address] [place] [address]   Nursing Team Member Karolyn Gauthier, RN   Social Work Team Member Niko Gavin   Patient/Family Present   Patient Present No   Patient's Family Present No     303 hearing commitment this morning  Upheld for 20 days  VPA level drawn today  Mom is concerned about medications and how they will affect his blood sugars  Provider notes that Depakote would not affect his sugars

## 2021-04-23 NOTE — PLAN OF CARE
Problem: DISCHARGE PLANNING  Goal: Discharge to home or other facility with appropriate resources  Description: INTERVENTIONS:  - Identify barriers to discharge w/patient and caregiver  - Arrange for needed discharge resources and transportation as appropriate  - Identify discharge learning needs (meds, wound care, etc )  - Arrange for interpretive services to assist at discharge as needed  - Refer to Case Management Department for coordinating discharge planning if the patient needs post-hospital services based on physician/advanced practitioner order or complex needs related to functional status, cognitive ability, or social support system  Outcome: Adequate for Discharge

## 2021-04-23 NOTE — NURSING NOTE
BTH review patient's belonging,all items accounted for  Review discharge instruction ,patient use white  Broad to communicate  understanding  Paper script given to patient  Escort by T to lobby for transport

## 2021-04-23 NOTE — PLAN OF CARE
Problem: Ineffective Coping  Goal: Cooperates with admission process  Description: Interventions:   - Complete admission process  Outcome: Adequate for Discharge  Goal: Identifies ineffective coping skills  Outcome: Adequate for Discharge  Goal: Identifies healthy coping skills  Outcome: Adequate for Discharge  Goal: Demonstrates healthy coping skills  Outcome: Adequate for Discharge  Goal: Participates in unit activities  Description: Interventions:  - Provide therapeutic environment   - Provide required programming   - Redirect inappropriate behaviors   Outcome: Adequate for Discharge  Goal: Patient/Family participate in treatment and DC plans  Description: Interventions:  - Provide therapeutic environment  Outcome: Adequate for Discharge  Goal: Patient/Family verbalizes awareness of resources  Outcome: Adequate for Discharge  Goal: Understands least restrictive measures  Description: Interventions:  - Utilize least restrictive behavior  Outcome: Adequate for Discharge  Goal: Free from restraint events  Description: - Utilize least restrictive measures   - Provide behavioral interventions   - Redirect inappropriate behaviors   Outcome: Adequate for Discharge     Problem: Risk for Self Injury/Neglect  Goal: Treatment Goal: Remain safe during length of stay, learn and adopt new coping skills, and be free of self-injurious ideation, impulses and acts at the time of discharge  Outcome: Adequate for Discharge  Goal: Verbalize thoughts and feelings  Description: Interventions:  - Assess and re-assess patient's lethality and potential for self-injury  - Engage patient in 1:1 interactions, daily, for a minimum of 15 minutes  - Encourage patient to express feelings, fears, frustrations, hopes  - Establish rapport/trust with patient   Outcome: Adequate for Discharge  Goal: Refrain from harming self  Description: Interventions:  - Monitor patient closely, per order  - Develop a trusting relationship  - Supervise medication ingestion, monitor effects and side effects   Outcome: Adequate for Discharge  Goal: Recognize maladaptive responses and adopt new coping mechanisms  Outcome: Adequate for Discharge  Goal: Complete daily ADLs, including personal hygiene independently, as able  Description: Interventions:  - Observe, teach, and assist patient with ADLS  - Monitor and promote a balance of rest/activity, with adequate nutrition and elimination  Outcome: Adequate for Discharge     Problem: Depression  Goal: Treatment Goal: Demonstrate behavioral control of depressive symptoms, verbalize feelings of improved mood/affect, and adopt new coping skills prior to discharge  Outcome: Adequate for Discharge  Goal: Refrain from isolation  Description: Interventions:  - Develop a trusting relationship   - Encourage socialization   Outcome: Adequate for Discharge  Goal: Refrain from self-neglect  Outcome: Adequate for Discharge     Problem: Anxiety  Goal: Anxiety is at manageable level  Description: Interventions:  - Assess and monitor patient's anxiety level  - Monitor for signs and symptoms (heart palpitations, chest pain, shortness of breath, headaches, nausea, feeling jumpy, restlessness, irritable, apprehensive)  - Collaborate with interdisciplinary team and initiate plan and interventions as ordered    - [place] patient to unit/surroundings  - Explain treatment plan  - Encourage participation in care  - Encourage verbalization of concerns/fears  - Identify coping mechanisms  - Assist in developing anxiety-reducing skills  - Administer/offer alternative therapies  - Limit or eliminate stimulants  Outcome: Adequate for Discharge     Problem: Risk for Violence/Aggression Toward Others  Goal: Treatment Goal: Refrain from acts of violence/aggression during length of stay, and demonstrate improved impulse control at the time of discharge  Outcome: Adequate for Discharge  Goal: Refrain from harming others  Outcome: Adequate for Discharge  Goal: Refrain from destructive acts on the environment or property  Outcome: Adequate for Discharge  Goal: Control angry outbursts  Description: Interventions:  - Monitor patient closely, per order  - Ensure early verbal de-escalation  - Monitor prn medication needs  - Set reasonable/therapeutic limits, outline behavioral expectations, and consequences   - Provide a non-threatening milieu, utilizing the least restrictive interventions   Outcome: Adequate for Discharge  Goal: Identify appropriate positive anger management techniques  Description: Interventions:  - Offer anger management and coping skills groups   - Staff will provide positive feedback for appropriate anger control  Outcome: Adequate for Discharge

## 2021-06-05 DIAGNOSIS — L70.0 ACNE VULGARIS: ICD-10-CM

## 2021-06-05 DIAGNOSIS — J30.2 SEASONAL ALLERGIES: ICD-10-CM

## 2021-06-06 RX ORDER — CETIRIZINE HYDROCHLORIDE 10 MG/1
TABLET ORAL
Qty: 30 TABLET | Refills: 5 | Status: SHIPPED | OUTPATIENT
Start: 2021-06-06 | End: 2021-08-16 | Stop reason: SDUPTHER

## 2021-06-06 RX ORDER — DOXYCYCLINE HYCLATE 100 MG/1
CAPSULE ORAL
Qty: 30 CAPSULE | Refills: 5 | Status: SHIPPED | OUTPATIENT
Start: 2021-06-06 | End: 2021-08-16 | Stop reason: SDUPTHER

## 2021-08-16 ENCOUNTER — OFFICE VISIT (OUTPATIENT)
Dept: FAMILY MEDICINE CLINIC | Facility: CLINIC | Age: 21
End: 2021-08-16
Payer: COMMERCIAL

## 2021-08-16 VITALS
RESPIRATION RATE: 20 BRPM | DIASTOLIC BLOOD PRESSURE: 64 MMHG | HEART RATE: 68 BPM | BODY MASS INDEX: 27.16 KG/M2 | SYSTOLIC BLOOD PRESSURE: 118 MMHG | WEIGHT: 169 LBS | HEIGHT: 66 IN | TEMPERATURE: 96.8 F

## 2021-08-16 DIAGNOSIS — L70.0 ACNE VULGARIS: ICD-10-CM

## 2021-08-16 DIAGNOSIS — E11.9 TYPE 2 DIABETES MELLITUS WITHOUT COMPLICATION, WITHOUT LONG-TERM CURRENT USE OF INSULIN (HCC): ICD-10-CM

## 2021-08-16 DIAGNOSIS — J30.2 SEASONAL ALLERGIES: ICD-10-CM

## 2021-08-16 DIAGNOSIS — E55.9 VITAMIN D DEFICIENCY: Primary | ICD-10-CM

## 2021-08-16 PROCEDURE — 3725F SCREEN DEPRESSION PERFORMED: CPT | Performed by: FAMILY MEDICINE

## 2021-08-16 PROCEDURE — 3078F DIAST BP <80 MM HG: CPT | Performed by: FAMILY MEDICINE

## 2021-08-16 PROCEDURE — 1036F TOBACCO NON-USER: CPT | Performed by: FAMILY MEDICINE

## 2021-08-16 PROCEDURE — 3074F SYST BP LT 130 MM HG: CPT | Performed by: FAMILY MEDICINE

## 2021-08-16 PROCEDURE — 3008F BODY MASS INDEX DOCD: CPT | Performed by: FAMILY MEDICINE

## 2021-08-16 PROCEDURE — 99214 OFFICE O/P EST MOD 30 MIN: CPT | Performed by: FAMILY MEDICINE

## 2021-08-16 RX ORDER — CETIRIZINE HYDROCHLORIDE 10 MG/1
10 TABLET ORAL DAILY
Qty: 30 TABLET | Refills: 5 | Status: SHIPPED | OUTPATIENT
Start: 2021-08-16 | End: 2022-03-04 | Stop reason: SDUPTHER

## 2021-08-16 RX ORDER — CLINDAMYCIN PHOSPHATE 11.9 MG/ML
SOLUTION TOPICAL 2 TIMES DAILY
Qty: 60 ML | Refills: 5 | Status: SHIPPED | OUTPATIENT
Start: 2021-08-16

## 2021-08-16 RX ORDER — FLUTICASONE PROPIONATE 50 MCG
2 SPRAY, SUSPENSION (ML) NASAL DAILY
Qty: 16 G | Refills: 5 | Status: SHIPPED | OUTPATIENT
Start: 2021-08-16 | End: 2022-04-07 | Stop reason: SDUPTHER

## 2021-08-16 RX ORDER — DOXYCYCLINE HYCLATE 100 MG/1
100 CAPSULE ORAL DAILY
Qty: 30 CAPSULE | Refills: 5 | Status: SHIPPED | OUTPATIENT
Start: 2021-08-16 | End: 2022-02-12

## 2021-08-16 NOTE — PROGRESS NOTES
Assessment/Plan: diabetes mellitus type 2 with a hemoglobin A1c on last check of 5 7    Seasonal allergies Flonase is affective    Acne vulgaris treated with doxycycline Cleocin 1% and benzoyl peroxide    Autism the patient is nonverbal    Problem List Items Addressed This Visit        Endocrine    Type 2 diabetes mellitus without complication, without long-term current use of insulin ([facility])       Musculoskeletal and Integument    Acne vulgaris       Other    Seasonal allergies           Diagnoses and all orders for this visit:    Type 2 diabetes mellitus without complication, without long-term current use of insulin ([facility])  -     metFORMIN (GLUCOPHAGE) 1000 MG tablet; Take 1 tablet (1,000 mg total) by mouth 2 (two) times a day    Seasonal allergies  -     cetirizine (ZyrTEC) 10 mg tablet; Take 1 tablet (10 mg total) by mouth daily  -     fluticasone (FLONASE) 50 mcg/act nasal spray; 2 sprays into each nostril daily    Acne vulgaris  -     benzoyl peroxide 5 % gel; Apply topically 2 (two) times a day  -     clindamycin (CLEOCIN T) 1 % external solution; Apply topically 2 (two) times a day  -     doxycycline hyclate (VIBRAMYCIN) 100 mg capsule; Take 1 capsule (100 mg total) by mouth daily    Other orders  -     NON FORMULARY; Medical Marijuana        No problem-specific Assessment & Plan notes found for this encounter  PHQ-9 Depression Screening    PHQ-9:   Frequency of the following problems over the past two weeks:      Little interest or pleasure in doing things: 0 - not at all  Feeling down, depressed, or hopeless: 0 - not at all  PHQ-2 Score: 0          Body mass index is 27 28 kg/m²  BMI Counseling: Body mass index is 27 28 kg/m²  The BMI    Subjective:      Patient ID: Ángela Richard is a 24 y o  male       Patient presents accompanied by mother to reestablish care for diabetes mellitus the patient is nonverbal      The following portions of the patient's history were reviewed and updated as appropriate: He has a past medical history of Allergic, Autism, Diabetes mellitus ([address] ), and Seasonal allergies  ,  does not have any pertinent problems on file  ,   has a past surgical history that includes Cataract extraction, bilateral ,  family history includes Alcohol abuse in his maternal grandmother; Mental illness in his maternal aunt; No Known Problems in his father and mother  ,   reports that he is a non-smoker but has been exposed to tobacco smoke  He has never used smokeless tobacco  He reports that he does not drink alcohol and does not use drugs  ,  has No Known Allergies     Current Outpatient Medications   Medication Sig Dispense Refill    Apple Cider Vinegar 600 MG CAPS Take 1,200 mg by mouth 3 (three) times a day with meals      ascorbic acid (VITAMIN C) 250 mg tablet Take 250 mg by mouth daily      benzoyl peroxide 5 % gel Apply topically 2 (two) times a day 45 g 5    cetirizine (ZyrTEC) 10 mg tablet Take 1 tablet by mouth once daily 30 tablet 5    Cholecalciferol (D3 VITAMIN PO) Take 5,000 Units by mouth daily      clindamycin (CLEOCIN T) 1 % external solution Apply topically 2 (two) times a day 60 mL 5    Cyanocobalamin (VITAMIN B-12) 6000 MCG SUBL Place 6,000 mcg under the tongue daily      doxycycline hyclate (VIBRAMYCIN) 100 mg capsule Take 1 capsule by mouth once daily 30 capsule 5    fluticasone (FLONASE) 50 mcg/act nasal spray 2 sprays into each nostril daily 16 g 5    metFORMIN (GLUCOPHAGE) 1000 MG tablet Take 1 tablet (1,000 mg total) by mouth 2 (two) times a day 60 tablet 5    NON FORMULARY Medical Marijuana      zinc sulfate (ZINCATE) 220 mg capsule Take 220 mg by mouth daily       No current facility-administered medications for this visit  Review of Systems   Constitutional: Negative for chills and fever  HENT: Negative for ear pain and sore throat  Eyes: Negative for pain and visual disturbance  Respiratory: Negative for cough and shortness of breath      Cardiovascular: Negative for chest pain and palpitations  Gastrointestinal: Negative for abdominal pain and vomiting  Genitourinary: Negative for dysuria and hematuria  Musculoskeletal: Negative for arthralgias and back pain  Skin: Negative for color change and rash  Neurological: Negative for seizures and syncope  All other systems reviewed and are negative  Objective:    /64   Pulse 68   Temp (!) 96 8 °F (36 °C)   Resp 20   Ht 5' 6" (1 676 m)   Wt 76 7 kg (169 lb)   BMI 27 28 kg/m²   Body mass index is 27 28 kg/m²  Physical Exam  Constitutional:       Appearance: He is well-developed  HENT:      Head: Normocephalic  Eyes:      Pupils: Pupils are equal, round, and reactive to light  Cardiovascular:      Rate and Rhythm: Normal rate and regular rhythm  Heart sounds: Normal heart sounds  Pulmonary:      Effort: Pulmonary effort is normal       Breath sounds: Normal breath sounds  Abdominal:      General: Bowel sounds are normal       Palpations: Abdomen is soft  Tenderness: There is no abdominal tenderness  Musculoskeletal:      Cervical back: Normal range of motion  Skin:     General: Skin is warm  Neurological:      Mental Status: He is alert and oriented to person, place, and time

## 2021-08-26 ENCOUNTER — VBI (OUTPATIENT)
Dept: ADMINISTRATIVE | Facility: OTHER | Age: 21
End: 2021-08-26

## 2021-10-12 ENCOUNTER — APPOINTMENT (OUTPATIENT)
Dept: LAB | Facility: HOSPITAL | Age: 21
End: 2021-10-12
Attending: FAMILY MEDICINE
Payer: COMMERCIAL

## 2021-10-12 DIAGNOSIS — E55.9 VITAMIN D DEFICIENCY: ICD-10-CM

## 2021-10-12 DIAGNOSIS — E11.9 TYPE 2 DIABETES MELLITUS WITHOUT COMPLICATION, WITHOUT LONG-TERM CURRENT USE OF INSULIN (HCC): ICD-10-CM

## 2021-10-12 LAB
25(OH)D3 SERPL-MCNC: 107.5 NG/ML (ref 30–100)
ALBUMIN SERPL BCP-MCNC: 4.1 G/DL (ref 3.5–5)
ALP SERPL-CCNC: 71 U/L (ref 46–116)
ALT SERPL W P-5'-P-CCNC: 31 U/L (ref 12–78)
ANION GAP SERPL CALCULATED.3IONS-SCNC: 9 MMOL/L (ref 4–13)
AST SERPL W P-5'-P-CCNC: 17 U/L (ref 5–45)
BASOPHILS # BLD AUTO: 0.08 THOUSANDS/ΜL (ref 0–0.1)
BASOPHILS NFR BLD AUTO: 1 % (ref 0–1)
BILIRUB SERPL-MCNC: 0.28 MG/DL (ref 0.2–1)
BUN SERPL-MCNC: 10 MG/DL (ref 5–25)
CALCIUM SERPL-MCNC: 9 MG/DL (ref 8.3–10.1)
CHLORIDE SERPL-SCNC: 101 MMOL/L (ref 100–108)
CHOLEST SERPL-MCNC: 158 MG/DL (ref 50–200)
CO2 SERPL-SCNC: 28 MMOL/L (ref 21–32)
CREAT SERPL-MCNC: 0.71 MG/DL (ref 0.6–1.3)
CREAT UR-MCNC: 118 MG/DL
EOSINOPHIL # BLD AUTO: 0.1 THOUSAND/ΜL (ref 0–0.61)
EOSINOPHIL NFR BLD AUTO: 1 % (ref 0–6)
ERYTHROCYTE [DISTWIDTH] IN BLOOD BY AUTOMATED COUNT: 13.1 % (ref 11.6–15.1)
EST. AVERAGE GLUCOSE BLD GHB EST-MCNC: 140 MG/DL
GFR SERPL CREATININE-BSD FRML MDRD: 133 ML/MIN/1.73SQ M
GLUCOSE P FAST SERPL-MCNC: 135 MG/DL (ref 65–99)
HBA1C MFR BLD: 6.5 %
HCT VFR BLD AUTO: 43.4 % (ref 36.5–49.3)
HDLC SERPL-MCNC: 54 MG/DL
HGB BLD-MCNC: 14.1 G/DL (ref 12–17)
IMM GRANULOCYTES # BLD AUTO: 0.03 THOUSAND/UL (ref 0–0.2)
IMM GRANULOCYTES NFR BLD AUTO: 0 % (ref 0–2)
LDLC SERPL CALC-MCNC: 79 MG/DL (ref 0–100)
LYMPHOCYTES # BLD AUTO: 3.2 THOUSANDS/ΜL (ref 0.6–4.47)
LYMPHOCYTES NFR BLD AUTO: 40 % (ref 14–44)
MCH RBC QN AUTO: 29.1 PG (ref 26.8–34.3)
MCHC RBC AUTO-ENTMCNC: 32.5 G/DL (ref 31.4–37.4)
MCV RBC AUTO: 90 FL (ref 82–98)
MICROALBUMIN UR-MCNC: 10.3 MG/L (ref 0–20)
MICROALBUMIN/CREAT 24H UR: 9 MG/G CREATININE (ref 0–30)
MONOCYTES # BLD AUTO: 0.56 THOUSAND/ΜL (ref 0.17–1.22)
MONOCYTES NFR BLD AUTO: 7 % (ref 4–12)
NEUTROPHILS # BLD AUTO: 4.14 THOUSANDS/ΜL (ref 1.85–7.62)
NEUTS SEG NFR BLD AUTO: 51 % (ref 43–75)
NRBC BLD AUTO-RTO: 0 /100 WBCS
PLATELET # BLD AUTO: 219 THOUSANDS/UL (ref 149–390)
PMV BLD AUTO: 9.7 FL (ref 8.9–12.7)
POTASSIUM SERPL-SCNC: 4.2 MMOL/L (ref 3.5–5.3)
PROT SERPL-MCNC: 7.5 G/DL (ref 6.4–8.2)
RBC # BLD AUTO: 4.85 MILLION/UL (ref 3.88–5.62)
SODIUM SERPL-SCNC: 138 MMOL/L (ref 136–145)
TRIGL SERPL-MCNC: 123 MG/DL
WBC # BLD AUTO: 8.11 THOUSAND/UL (ref 4.31–10.16)

## 2021-10-12 PROCEDURE — 85025 COMPLETE CBC W/AUTO DIFF WBC: CPT

## 2021-10-12 PROCEDURE — 80053 COMPREHEN METABOLIC PANEL: CPT

## 2021-10-12 PROCEDURE — 80061 LIPID PANEL: CPT

## 2021-10-12 PROCEDURE — 36415 COLL VENOUS BLD VENIPUNCTURE: CPT

## 2021-10-12 PROCEDURE — 83036 HEMOGLOBIN GLYCOSYLATED A1C: CPT

## 2021-10-12 PROCEDURE — 82570 ASSAY OF URINE CREATININE: CPT

## 2021-10-12 PROCEDURE — 82043 UR ALBUMIN QUANTITATIVE: CPT

## 2021-10-12 PROCEDURE — 82306 VITAMIN D 25 HYDROXY: CPT

## 2021-12-16 ENCOUNTER — OFFICE VISIT (OUTPATIENT)
Dept: FAMILY MEDICINE CLINIC | Facility: CLINIC | Age: 21
End: 2021-12-16
Payer: COMMERCIAL

## 2021-12-16 VITALS
DIASTOLIC BLOOD PRESSURE: 74 MMHG | RESPIRATION RATE: 16 BRPM | BODY MASS INDEX: 30.37 KG/M2 | HEART RATE: 68 BPM | HEIGHT: 66 IN | SYSTOLIC BLOOD PRESSURE: 126 MMHG | WEIGHT: 189 LBS | TEMPERATURE: 96.8 F

## 2021-12-16 DIAGNOSIS — E11.9 TYPE 2 DIABETES MELLITUS WITHOUT COMPLICATION, WITHOUT LONG-TERM CURRENT USE OF INSULIN (HCC): Primary | ICD-10-CM

## 2021-12-16 DIAGNOSIS — F63.81 INTERMITTENT EXPLOSIVE DISORDER: ICD-10-CM

## 2021-12-16 DIAGNOSIS — F84.0 AUTISM: ICD-10-CM

## 2021-12-16 DIAGNOSIS — L70.0 ACNE VULGARIS: ICD-10-CM

## 2021-12-16 DIAGNOSIS — E78.5 DYSLIPIDEMIA: ICD-10-CM

## 2021-12-16 DIAGNOSIS — E55.9 VITAMIN D DEFICIENCY: ICD-10-CM

## 2021-12-16 PROCEDURE — 3074F SYST BP LT 130 MM HG: CPT | Performed by: FAMILY MEDICINE

## 2021-12-16 PROCEDURE — 3078F DIAST BP <80 MM HG: CPT | Performed by: FAMILY MEDICINE

## 2021-12-16 PROCEDURE — 99214 OFFICE O/P EST MOD 30 MIN: CPT | Performed by: FAMILY MEDICINE

## 2021-12-16 RX ORDER — ATORVASTATIN CALCIUM 10 MG/1
10 TABLET, FILM COATED ORAL DAILY
Qty: 30 TABLET | Refills: 5 | Status: SHIPPED | OUTPATIENT
Start: 2021-12-16 | End: 2021-12-16 | Stop reason: SDUPTHER

## 2022-03-04 ENCOUNTER — TELEPHONE (OUTPATIENT)
Dept: FAMILY MEDICINE CLINIC | Facility: CLINIC | Age: 22
End: 2022-03-04

## 2022-03-04 DIAGNOSIS — L70.0 ACNE VULGARIS: Primary | ICD-10-CM

## 2022-03-04 DIAGNOSIS — J30.2 SEASONAL ALLERGIES: ICD-10-CM

## 2022-03-04 DIAGNOSIS — E11.9 TYPE 2 DIABETES MELLITUS WITHOUT COMPLICATION, WITHOUT LONG-TERM CURRENT USE OF INSULIN (HCC): ICD-10-CM

## 2022-03-04 RX ORDER — DOXYCYCLINE HYCLATE 100 MG/1
100 CAPSULE ORAL DAILY
Qty: 30 CAPSULE | Refills: 5 | Status: SHIPPED | OUTPATIENT
Start: 2022-03-04 | End: 2022-08-31

## 2022-03-04 RX ORDER — CETIRIZINE HYDROCHLORIDE 10 MG/1
10 TABLET ORAL DAILY
Qty: 30 TABLET | Refills: 5 | Status: SHIPPED | OUTPATIENT
Start: 2022-03-04 | End: 2022-04-07 | Stop reason: SDUPTHER

## 2022-03-24 ENCOUNTER — APPOINTMENT (OUTPATIENT)
Dept: LAB | Facility: HOSPITAL | Age: 22
End: 2022-03-24
Attending: FAMILY MEDICINE
Payer: COMMERCIAL

## 2022-03-24 DIAGNOSIS — E55.9 VITAMIN D DEFICIENCY: ICD-10-CM

## 2022-03-24 DIAGNOSIS — E11.9 TYPE 2 DIABETES MELLITUS WITHOUT COMPLICATION, WITHOUT LONG-TERM CURRENT USE OF INSULIN (HCC): ICD-10-CM

## 2022-03-24 LAB
25(OH)D3 SERPL-MCNC: 41.7 NG/ML (ref 30–100)
ALBUMIN SERPL BCP-MCNC: 4.3 G/DL (ref 3.5–5)
ALP SERPL-CCNC: 66 U/L (ref 46–116)
ALT SERPL W P-5'-P-CCNC: 41 U/L (ref 12–78)
ANION GAP SERPL CALCULATED.3IONS-SCNC: 11 MMOL/L (ref 4–13)
AST SERPL W P-5'-P-CCNC: 23 U/L (ref 5–45)
BILIRUB SERPL-MCNC: 0.42 MG/DL (ref 0.2–1)
BUN SERPL-MCNC: 10 MG/DL (ref 5–25)
CALCIUM SERPL-MCNC: 9 MG/DL (ref 8.3–10.1)
CHLORIDE SERPL-SCNC: 104 MMOL/L (ref 100–108)
CO2 SERPL-SCNC: 26 MMOL/L (ref 21–32)
CREAT SERPL-MCNC: 0.72 MG/DL (ref 0.6–1.3)
CREAT UR-MCNC: 198 MG/DL
EST. AVERAGE GLUCOSE BLD GHB EST-MCNC: 166 MG/DL
GFR SERPL CREATININE-BSD FRML MDRD: 132 ML/MIN/1.73SQ M
GLUCOSE P FAST SERPL-MCNC: 148 MG/DL (ref 65–99)
HBA1C MFR BLD: 7.4 %
MICROALBUMIN UR-MCNC: 53.4 MG/L (ref 0–20)
MICROALBUMIN/CREAT 24H UR: 27 MG/G CREATININE (ref 0–30)
POTASSIUM SERPL-SCNC: 4.3 MMOL/L (ref 3.5–5.3)
PROT SERPL-MCNC: 8 G/DL (ref 6.4–8.2)
SODIUM SERPL-SCNC: 141 MMOL/L (ref 136–145)

## 2022-03-24 PROCEDURE — 83036 HEMOGLOBIN GLYCOSYLATED A1C: CPT

## 2022-03-24 PROCEDURE — 82306 VITAMIN D 25 HYDROXY: CPT

## 2022-03-24 PROCEDURE — 80053 COMPREHEN METABOLIC PANEL: CPT

## 2022-03-24 PROCEDURE — 82570 ASSAY OF URINE CREATININE: CPT

## 2022-03-24 PROCEDURE — 36415 COLL VENOUS BLD VENIPUNCTURE: CPT

## 2022-03-24 PROCEDURE — 82043 UR ALBUMIN QUANTITATIVE: CPT

## 2022-04-07 DIAGNOSIS — J30.2 SEASONAL ALLERGIES: ICD-10-CM

## 2022-04-07 RX ORDER — CETIRIZINE HYDROCHLORIDE 10 MG/1
10 TABLET ORAL DAILY
Qty: 90 TABLET | Refills: 1 | Status: SHIPPED | OUTPATIENT
Start: 2022-04-07

## 2022-04-07 RX ORDER — FLUTICASONE PROPIONATE 50 MCG
2 SPRAY, SUSPENSION (ML) NASAL DAILY
Qty: 48 G | Refills: 3 | Status: SHIPPED | OUTPATIENT
Start: 2022-04-07

## 2022-04-15 ENCOUNTER — OFFICE VISIT (OUTPATIENT)
Dept: FAMILY MEDICINE CLINIC | Facility: CLINIC | Age: 22
End: 2022-04-15
Payer: COMMERCIAL

## 2022-04-15 VITALS
DIASTOLIC BLOOD PRESSURE: 68 MMHG | WEIGHT: 186 LBS | SYSTOLIC BLOOD PRESSURE: 124 MMHG | HEART RATE: 68 BPM | HEIGHT: 66 IN | BODY MASS INDEX: 29.89 KG/M2 | TEMPERATURE: 97.8 F | RESPIRATION RATE: 20 BRPM

## 2022-04-15 DIAGNOSIS — E67.3 HYPERVITAMINOSIS D: ICD-10-CM

## 2022-04-15 DIAGNOSIS — F84.0 AUTISM: ICD-10-CM

## 2022-04-15 DIAGNOSIS — E11.9 TYPE 2 DIABETES MELLITUS WITHOUT COMPLICATION, WITHOUT LONG-TERM CURRENT USE OF INSULIN (HCC): Primary | ICD-10-CM

## 2022-04-15 DIAGNOSIS — F63.81 INTERMITTENT EXPLOSIVE DISORDER: ICD-10-CM

## 2022-04-15 DIAGNOSIS — L70.0 ACNE VULGARIS: ICD-10-CM

## 2022-04-15 DIAGNOSIS — E78.5 DYSLIPIDEMIA: ICD-10-CM

## 2022-04-15 PROCEDURE — 99214 OFFICE O/P EST MOD 30 MIN: CPT | Performed by: FAMILY MEDICINE

## 2022-04-15 PROCEDURE — 3078F DIAST BP <80 MM HG: CPT | Performed by: FAMILY MEDICINE

## 2022-04-15 PROCEDURE — 3074F SYST BP LT 130 MM HG: CPT | Performed by: FAMILY MEDICINE

## 2022-04-15 NOTE — PROGRESS NOTES
Assessment/Plan:  Diabetes mellitus type 2 with a glucose well controlled and hemoglobin A1c of 7 1 up from 6 5    Hyperlipidemia with cholesterol pattern much improved on atorvastatin 10 mg    Autism nonverbal with explosive disorder doing well with parental support    Acne vulgaris treated with benzoyl peroxide and Cleocin 1% gel with good effect    Hypervitaminosis D resolved with the vitamin-D level now at 41 7 down from 107 5    Seasonal allergies treated with Flonase and Zyrtec 10 mg  Problem List Items Addressed This Visit        Endocrine    Type 2 diabetes mellitus without complication, without long-term current use of insulin (HCC) - Primary    Relevant Orders    Comprehensive metabolic panel    CBC and differential    TSH, 3rd generation with Free T4 reflex    Lipid Panel with Direct LDL reflex           Diagnoses and all orders for this visit:    Type 2 diabetes mellitus without complication, without long-term current use of insulin ([facility])  -     Comprehensive metabolic panel; Future  -     CBC and differential; Future  -     TSH, 3rd generation with Free T4 reflex; Future  -     Lipid Panel with Direct LDL reflex; Future        No problem-specific Assessment & Plan notes found for this encounter  PHQ-2/9 Depression Screening    Little interest or pleasure in doing things: 0 - not at all  Feeling down, depressed, or hopeless: 0 - not at all  PHQ-2 Score: 0  PHQ-2 Interpretation: Negative depression screen          Body mass index is 30 02 kg/m²  BMI Counseling: Body mass index is 30 02 kg/m²  The BMI   Subjective:      Patient ID: Jethro Patino is a 24 y o  male  Patient presents accompanied by father the patient has severe autism and is nonverbal      The following portions of the patient's history were reviewed and updated as appropriate:   He has a past medical history of Allergic, Autism, Diabetes mellitus ([address] ), and Seasonal allergies  ,  does not have any pertinent problems on file  , has a past surgical history that includes Cataract extraction, bilateral ,  family history includes Alcohol abuse in his maternal grandmother; Mental illness in his maternal aunt; No Known Problems in his father and mother  ,   reports that he is a non-smoker but has been exposed to tobacco smoke  He has never used smokeless tobacco  He reports that he does not drink alcohol and does not use drugs  ,  has No Known Allergies     Current Outpatient Medications   Medication Sig Dispense Refill    Apple Cider Vinegar 600 MG CAPS Take 1,200 mg by mouth 3 (three) times a day with meals      ascorbic acid (VITAMIN C) 250 mg tablet Take 250 mg by mouth daily      benzoyl peroxide 5 % gel Apply topically 2 (two) times a day 45 g 5    cetirizine (ZyrTEC) 10 mg tablet Take 1 tablet (10 mg total) by mouth daily 90 tablet 1    clindamycin (CLEOCIN T) 1 % external solution Apply topically 2 (two) times a day 60 mL 5    Cyanocobalamin (VITAMIN B-12) 6000 MCG SUBL Place 6,000 mcg under the tongue daily      doxycycline hyclate (VIBRAMYCIN) 100 mg capsule Take 1 capsule (100 mg total) by mouth in the morning 30 capsule 5    fluticasone (FLONASE) 50 mcg/act nasal spray 2 sprays into each nostril daily 48 g 3    metFORMIN (GLUCOPHAGE) 1000 MG tablet Take 1 tablet (1,000 mg total) by mouth 2 (two) times a day 60 tablet 5    NON FORMULARY Medical Marijuana      zinc sulfate (ZINCATE) 220 mg capsule Take 220 mg by mouth daily       No current facility-administered medications for this visit  Review of Systems   Unable to perform ROS: Patient nonverbal (Father states there are no issues)         Objective:    /68   Pulse 68   Temp 97 8 °F (36 6 °C)   Resp 20   Ht 5' 6" (1 676 m)   Wt 84 4 kg (186 lb)   BMI 30 02 kg/m²   Body mass index is 30 02 kg/m²  Physical Exam  Constitutional:       Appearance: He is well-developed  HENT:      Head: Normocephalic     Eyes:      Pupils: Pupils are equal, round, and reactive to light  Cardiovascular:      Rate and Rhythm: Normal rate and regular rhythm  Heart sounds: Normal heart sounds  Pulmonary:      Effort: Pulmonary effort is normal       Breath sounds: Normal breath sounds  Abdominal:      General: Bowel sounds are normal       Palpations: Abdomen is soft  Tenderness: There is no abdominal tenderness  Musculoskeletal:      Cervical back: Normal range of motion  Skin:     General: Skin is warm  Neurological:      Mental Status: He is alert and oriented to person, place, and time

## 2022-08-09 NOTE — PLAN OF CARE
Dapsone Pregnancy And Lactation Text: This medication is Pregnancy Category C and is not considered safe during pregnancy or breast feeding. Problem: Ineffective Coping  Goal: Participates in unit activities  Description: Interventions:  - Provide therapeutic environment   - Provide required programming   - Redirect inappropriate behaviors   Outcome: Progressing Azithromycin Counseling:  I discussed with the patient the risks of azithromycin including but not limited to GI upset, allergic reaction, drug rash, diarrhea, and yeast infections. Tetracycline Pregnancy And Lactation Text: This medication is Pregnancy Category D and not consider safe during pregnancy. It is also excreted in breast milk. Topical Retinoid counseling:  Patient advised to apply a pea-sized amount only at bedtime and wait 30 minutes after washing their face before applying.  If too drying, patient may add a non-comedogenic moisturizer. The patient verbalized understanding of the proper use and possible adverse effects of retinoids.  All of the patient's questions and concerns were addressed. Bactrim Counseling:  I discussed with the patient the risks of sulfa antibiotics including but not limited to GI upset, allergic reaction, drug rash, diarrhea, dizziness, photosensitivity, and yeast infections.  Rarely, more serious reactions can occur including but not limited to aplastic anemia, agranulocytosis, methemoglobinemia, blood dyscrasias, liver or kidney failure, lung infiltrates or desquamative/blistering drug rashes. Doxycycline Pregnancy And Lactation Text: This medication is Pregnancy Category D and not consider safe during pregnancy. It is also excreted in breast milk but is considered safe for shorter treatment courses. Minocycline Counseling: Patient advised regarding possible photosensitivity and discoloration of the teeth, skin, lips, tongue and gums.  Patient instructed to avoid sunlight, if possible.  When exposed to sunlight, patients should wear protective clothing, sunglasses, and sunscreen.  The patient was instructed to call the office immediately if the following severe adverse effects occur:  hearing changes, easy bruising/bleeding, severe headache, or vision changes.  The patient verbalized understanding of the proper use and possible adverse effects of minocycline.  All of the patient's questions and concerns were addressed. Topical Sulfur Applications Pregnancy And Lactation Text: This medication is Pregnancy Category C and has an unknown safety profile during pregnancy. It is unknown if this topical medication is excreted in breast milk. High Dose Vitamin A Counseling: Side effects reviewed, pt to contact office should one occur. Topical Clindamycin Pregnancy And Lactation Text: This medication is Pregnancy Category B and is considered safe during pregnancy. It is unknown if it is excreted in breast milk. Spironolactone Pregnancy And Lactation Text: This medication can cause feminization of the male fetus and should be avoided during pregnancy. The active metabolite is also found in breast milk. Benzoyl Peroxide Counseling: Patient counseled that medicine may cause skin irritation and bleach clothing.  In the event of skin irritation, the patient was advised to reduce the amount of the drug applied or use it less frequently.   The patient verbalized understanding of the proper use and possible adverse effects of benzoyl peroxide.  All of the patient's questions and concerns were addressed. Birth Control Pills Pregnancy And Lactation Text: This medication should be avoided if pregnant and for the first 30 days post-partum. Isotretinoin Counseling: Patient should get monthly blood tests, not donate blood, not drive at night if vision affected, not share medication, and not undergo elective surgery for 6 months after tx completed. Side effects reviewed, pt to contact office should one occur. Azelaic Acid Counseling: Patient counseled that medicine may cause skin irritation and to avoid applying near the eyes.  In the event of skin irritation, the patient was advised to reduce the amount of the drug applied or use it less frequently.   The patient verbalized understanding of the proper use and possible adverse effects of azelaic acid.  All of the patient's questions and concerns were addressed. Tazorac Pregnancy And Lactation Text: This medication is not safe during pregnancy. It is unknown if this medication is excreted in breast milk. Bactrim Pregnancy And Lactation Text: This medication is Pregnancy Category D and is known to cause fetal risk.  It is also excreted in breast milk. Erythromycin Counseling:  I discussed with the patient the risks of erythromycin including but not limited to GI upset, allergic reaction, drug rash, diarrhea, increase in liver enzymes, and yeast infections. Winlevi Counseling:  I discussed with the patient the risks of topical clascoterone including but not limited to erythema, scaling, itching, and stinging. Patient voiced their understanding. Aklief counseling:  Patient advised to apply a pea-sized amount only at bedtime and wait 30 minutes after washing their face before applying.  If too drying, patient may add a non-comedogenic moisturizer.  The most commonly reported side effects including irritation, redness, scaling, dryness, stinging, burning, itching, and increased risk of sunburn.  The patient verbalized understanding of the proper use and possible adverse effects of retinoids.  All of the patient's questions and concerns were addressed. Topical Retinoid Pregnancy And Lactation Text: This medication is Pregnancy Category C. It is unknown if this medication is excreted in breast milk. Detail Level: Zone Topical Sulfur Applications Counseling: Topical Sulfur Counseling: Patient counseled that this medication may cause skin irritation or allergic reactions.  In the event of skin irritation, the patient was advised to reduce the amount of the drug applied or use it less frequently.   The patient verbalized understanding of the proper use and possible adverse effects of topical sulfur application.  All of the patient's questions and concerns were addressed. Use Enhanced Medication Counseling?: No Tetracycline Counseling: Patient counseled regarding possible photosensitivity and increased risk for sunburn.  Patient instructed to avoid sunlight, if possible.  When exposed to sunlight, patients should wear protective clothing, sunglasses, and sunscreen.  The patient was instructed to call the office immediately if the following severe adverse effects occur:  hearing changes, easy bruising/bleeding, severe headache, or vision changes.  The patient verbalized understanding of the proper use and possible adverse effects of tetracycline.  All of the patient's questions and concerns were addressed. Patient understands to avoid pregnancy while on therapy due to potential birth defects. Benzoyl Peroxide Pregnancy And Lactation Text: This medication is Pregnancy Category C. It is unknown if benzoyl peroxide is excreted in breast milk. Dapsone Counseling: I discussed with the patient the risks of dapsone including but not limited to hemolytic anemia, agranulocytosis, rashes, methemoglobinemia, kidney failure, peripheral neuropathy, headaches, GI upset, and liver toxicity.  Patients who start dapsone require monitoring including baseline LFTs and weekly CBCs for the first month, then every month thereafter.  The patient verbalized understanding of the proper use and possible adverse effects of dapsone.  All of the patient's questions and concerns were addressed. Isotretinoin Pregnancy And Lactation Text: This medication is Pregnancy Category X and is considered extremely dangerous during pregnancy. It is unknown if it is excreted in breast milk. Azelaic Acid Pregnancy And Lactation Text: This medication is considered safe during pregnancy and breast feeding. Topical Clindamycin Counseling: Patient counseled that this medication may cause skin irritation or allergic reactions.  In the event of skin irritation, the patient was advised to reduce the amount of the drug applied or use it less frequently.   The patient verbalized understanding of the proper use and possible adverse effects of clindamycin.  All of the patient's questions and concerns were addressed. Spironolactone Counseling: Patient advised regarding risks of diarrhea, abdominal pain, hyperkalemia, birth defects (for female patients), liver toxicity and renal toxicity. The patient may need blood work to monitor liver and kidney function and potassium levels while on therapy. The patient verbalized understanding of the proper use and possible adverse effects of spironolactone.  All of the patient's questions and concerns were addressed. Azithromycin Pregnancy And Lactation Text: This medication is considered safe during pregnancy and is also secreted in breast milk. Doxycycline Counseling:  Patient counseled regarding possible photosensitivity and increased risk for sunburn.  Patient instructed to avoid sunlight, if possible.  When exposed to sunlight, patients should wear protective clothing, sunglasses, and sunscreen.  The patient was instructed to call the office immediately if the following severe adverse effects occur:  hearing changes, easy bruising/bleeding, severe headache, or vision changes.  The patient verbalized understanding of the proper use and possible adverse effects of doxycycline.  All of the patient's questions and concerns were addressed. High Dose Vitamin A Pregnancy And Lactation Text: High dose vitamin A therapy is contraindicated during pregnancy and breast feeding. Winlevi Pregnancy And Lactation Text: This medication is considered safe during pregnancy and breastfeeding. Aklief Pregnancy And Lactation Text: It is unknown if this medication is safe to use during pregnancy.  It is unknown if this medication is excreted in breast milk.  Breastfeeding women should use the topical cream on the smallest area of the skin for the shortest time needed while breastfeeding.  Do not apply to nipple and areola. Tazorac Counseling:  Patient advised that medication is irritating and drying.  Patient may need to apply sparingly and wash off after an hour before eventually leaving it on overnight.  The patient verbalized understanding of the proper use and possible adverse effects of tazorac.  All of the patient's questions and concerns were addressed. Sarecycline Counseling: Patient advised regarding possible photosensitivity and discoloration of the teeth, skin, lips, tongue and gums.  Patient instructed to avoid sunlight, if possible.  When exposed to sunlight, patients should wear protective clothing, sunglasses, and sunscreen.  The patient was instructed to call the office immediately if the following severe adverse effects occur:  hearing changes, easy bruising/bleeding, severe headache, or vision changes.  The patient verbalized understanding of the proper use and possible adverse effects of sarecycline.  All of the patient's questions and concerns were addressed. Erythromycin Pregnancy And Lactation Text: This medication is Pregnancy Category B and is considered safe during pregnancy. It is also excreted in breast milk. Birth Control Pills Counseling: Birth Control Pill Counseling: I discussed with the patient the potential side effects of OCPs including but not limited to increased risk of stroke, heart attack, thrombophlebitis, deep venous thrombosis, hepatic adenomas, breast changes, GI upset, headaches, and depression.  The patient verbalized understanding of the proper use and possible adverse effects of OCPs. All of the patient's questions and concerns were addressed.

## 2022-08-13 ENCOUNTER — APPOINTMENT (OUTPATIENT)
Dept: LAB | Facility: HOSPITAL | Age: 22
End: 2022-08-13
Attending: FAMILY MEDICINE
Payer: COMMERCIAL

## 2022-08-13 DIAGNOSIS — E11.9 TYPE 2 DIABETES MELLITUS WITHOUT COMPLICATION, WITHOUT LONG-TERM CURRENT USE OF INSULIN (HCC): ICD-10-CM

## 2022-08-13 LAB
ALBUMIN SERPL BCP-MCNC: 4.4 G/DL (ref 3.5–5)
ALP SERPL-CCNC: 66 U/L (ref 46–116)
ALT SERPL W P-5'-P-CCNC: 40 U/L (ref 12–78)
ANION GAP SERPL CALCULATED.3IONS-SCNC: 12 MMOL/L (ref 4–13)
AST SERPL W P-5'-P-CCNC: 21 U/L (ref 5–45)
BASOPHILS # BLD AUTO: 0.03 THOUSANDS/ΜL (ref 0–0.1)
BASOPHILS NFR BLD AUTO: 1 % (ref 0–1)
BILIRUB SERPL-MCNC: 0.64 MG/DL (ref 0.2–1)
BUN SERPL-MCNC: 8 MG/DL (ref 5–25)
CALCIUM SERPL-MCNC: 9.4 MG/DL (ref 8.3–10.1)
CHLORIDE SERPL-SCNC: 102 MMOL/L (ref 96–108)
CHOLEST SERPL-MCNC: 147 MG/DL
CO2 SERPL-SCNC: 27 MMOL/L (ref 21–32)
CREAT SERPL-MCNC: 0.75 MG/DL (ref 0.6–1.3)
EOSINOPHIL # BLD AUTO: 0.15 THOUSAND/ΜL (ref 0–0.61)
EOSINOPHIL NFR BLD AUTO: 2 % (ref 0–6)
ERYTHROCYTE [DISTWIDTH] IN BLOOD BY AUTOMATED COUNT: 13.2 % (ref 11.6–15.1)
EST. AVERAGE GLUCOSE BLD GHB EST-MCNC: 151 MG/DL
GFR SERPL CREATININE-BSD FRML MDRD: 129 ML/MIN/1.73SQ M
GLUCOSE P FAST SERPL-MCNC: 133 MG/DL (ref 65–99)
HBA1C MFR BLD: 6.9 %
HCT VFR BLD AUTO: 44.3 % (ref 36.5–49.3)
HDLC SERPL-MCNC: 48 MG/DL
HGB BLD-MCNC: 14.7 G/DL (ref 12–17)
IMM GRANULOCYTES # BLD AUTO: 0.02 THOUSAND/UL (ref 0–0.2)
IMM GRANULOCYTES NFR BLD AUTO: 0 % (ref 0–2)
LDLC SERPL CALC-MCNC: 81 MG/DL (ref 0–100)
LYMPHOCYTES # BLD AUTO: 2.72 THOUSANDS/ΜL (ref 0.6–4.47)
LYMPHOCYTES NFR BLD AUTO: 41 % (ref 14–44)
MCH RBC QN AUTO: 29 PG (ref 26.8–34.3)
MCHC RBC AUTO-ENTMCNC: 33.2 G/DL (ref 31.4–37.4)
MCV RBC AUTO: 87 FL (ref 82–98)
MONOCYTES # BLD AUTO: 0.41 THOUSAND/ΜL (ref 0.17–1.22)
MONOCYTES NFR BLD AUTO: 6 % (ref 4–12)
NEUTROPHILS # BLD AUTO: 3.29 THOUSANDS/ΜL (ref 1.85–7.62)
NEUTS SEG NFR BLD AUTO: 50 % (ref 43–75)
NRBC BLD AUTO-RTO: 0 /100 WBCS
PLATELET # BLD AUTO: 256 THOUSANDS/UL (ref 149–390)
PMV BLD AUTO: 9.9 FL (ref 8.9–12.7)
POTASSIUM SERPL-SCNC: 4.1 MMOL/L (ref 3.5–5.3)
PROT SERPL-MCNC: 7.6 G/DL (ref 6.4–8.4)
RBC # BLD AUTO: 5.07 MILLION/UL (ref 3.88–5.62)
SODIUM SERPL-SCNC: 141 MMOL/L (ref 135–147)
TRIGL SERPL-MCNC: 91 MG/DL
TSH SERPL DL<=0.05 MIU/L-ACNC: 1.67 UIU/ML (ref 0.45–4.5)
WBC # BLD AUTO: 6.62 THOUSAND/UL (ref 4.31–10.16)

## 2022-08-13 PROCEDURE — 3044F HG A1C LEVEL LT 7.0%: CPT | Performed by: FAMILY MEDICINE

## 2022-08-13 PROCEDURE — 85025 COMPLETE CBC W/AUTO DIFF WBC: CPT

## 2022-08-13 PROCEDURE — 84443 ASSAY THYROID STIM HORMONE: CPT

## 2022-08-13 PROCEDURE — 83036 HEMOGLOBIN GLYCOSYLATED A1C: CPT

## 2022-08-13 PROCEDURE — 36415 COLL VENOUS BLD VENIPUNCTURE: CPT

## 2022-08-13 PROCEDURE — 80061 LIPID PANEL: CPT

## 2022-08-13 PROCEDURE — 80053 COMPREHEN METABOLIC PANEL: CPT

## 2022-08-15 ENCOUNTER — OFFICE VISIT (OUTPATIENT)
Dept: FAMILY MEDICINE CLINIC | Facility: CLINIC | Age: 22
End: 2022-08-15
Payer: COMMERCIAL

## 2022-08-15 VITALS
RESPIRATION RATE: 20 BRPM | BODY MASS INDEX: 28.61 KG/M2 | WEIGHT: 178 LBS | HEIGHT: 66 IN | DIASTOLIC BLOOD PRESSURE: 84 MMHG | TEMPERATURE: 98 F | HEART RATE: 80 BPM | SYSTOLIC BLOOD PRESSURE: 130 MMHG

## 2022-08-15 DIAGNOSIS — L70.0 ACNE VULGARIS: ICD-10-CM

## 2022-08-15 DIAGNOSIS — E78.5 DYSLIPIDEMIA: ICD-10-CM

## 2022-08-15 DIAGNOSIS — E11.9 TYPE 2 DIABETES MELLITUS WITHOUT COMPLICATION, WITHOUT LONG-TERM CURRENT USE OF INSULIN (HCC): Primary | ICD-10-CM

## 2022-08-15 DIAGNOSIS — F84.0 AUTISM: ICD-10-CM

## 2022-08-15 DIAGNOSIS — E55.9 VITAMIN D DEFICIENCY: ICD-10-CM

## 2022-08-15 DIAGNOSIS — J30.2 SEASONAL ALLERGIES: ICD-10-CM

## 2022-08-15 PROCEDURE — 99214 OFFICE O/P EST MOD 30 MIN: CPT | Performed by: FAMILY MEDICINE

## 2022-08-15 PROCEDURE — 3075F SYST BP GE 130 - 139MM HG: CPT | Performed by: FAMILY MEDICINE

## 2022-08-15 PROCEDURE — 3079F DIAST BP 80-89 MM HG: CPT | Performed by: FAMILY MEDICINE

## 2022-08-15 RX ORDER — DOXYCYCLINE HYCLATE 100 MG/1
100 CAPSULE ORAL DAILY
Qty: 90 CAPSULE | Refills: 1 | Status: SHIPPED | OUTPATIENT
Start: 2022-08-15 | End: 2023-02-11

## 2022-08-15 RX ORDER — FLUTICASONE PROPIONATE 50 MCG
2 SPRAY, SUSPENSION (ML) NASAL DAILY
Qty: 48 G | Refills: 3 | Status: SHIPPED | OUTPATIENT
Start: 2022-08-15

## 2022-08-15 RX ORDER — CLINDAMYCIN PHOSPHATE 11.9 MG/ML
SOLUTION TOPICAL 2 TIMES DAILY
Qty: 60 ML | Refills: 5 | Status: SHIPPED | OUTPATIENT
Start: 2022-08-15

## 2022-08-15 NOTE — PROGRESS NOTES
Assessment/Plan:  Diabetes mellitus type 2 with the hemoglobin A1c at 6 9 down from 7 2  Seasonal allergies are treated with Zyrtec 10 mg with good effect    History of vitamin-D deficiency will evaluate a vitamin-D level    Acne vulgaris treated with doxycycline 100 mg daily    History of dyslipidemia currently corrected with diet with a total cholesterol of 147 triglycerides at 91 HDL at 48 and LDL at 81    Autism currently at baseline    Problem List Items Addressed This Visit        Endocrine    Type 2 diabetes mellitus without complication, without long-term current use of insulin ([facility]) - Primary    Relevant Medications    metFORMIN (GLUCOPHAGE) 1000 MG tablet    Other Relevant Orders    Hemoglobin A1C    Comprehensive metabolic panel    CBC and differential    TSH, 3rd generation with Free T4 reflex       Musculoskeletal and Integument    Acne vulgaris    Relevant Medications    doxycycline hyclate (VIBRAMYCIN) 100 mg capsule    benzoyl peroxide 5 % gel    clindamycin (CLEOCIN T) 1 % external solution       Other    Seasonal allergies    Relevant Medications    fluticasone (FLONASE) 50 mcg/act nasal spray    Dyslipidemia    Autism      Other Visit Diagnoses     Vitamin D deficiency        Relevant Orders    Vitamin D 25 hydroxy           Diagnoses and all orders for this visit:    Type 2 diabetes mellitus without complication, without long-term current use of insulin ([facility])  -     metFORMIN (GLUCOPHAGE) 1000 MG tablet; Take 1 tablet (1,000 mg total) by mouth 2 (two) times a day  -     Hemoglobin A1C; Future  -     Comprehensive metabolic panel; Future  -     CBC and differential; Future  -     TSH, 3rd generation with Free T4 reflex; Future    Seasonal allergies  -     fluticasone (FLONASE) 50 mcg/act nasal spray; 2 sprays into each nostril daily    Vitamin D deficiency  -     Vitamin D 25 hydroxy; Future    Acne vulgaris  -     doxycycline hyclate (VIBRAMYCIN) 100 mg capsule;  Take 1 capsule (100 mg total) by mouth in the morning  -     benzoyl peroxide 5 % gel; Apply topically 2 (two) times a day  -     clindamycin (CLEOCIN T) 1 % external solution; Apply topically 2 (two) times a day    Autism    Dyslipidemia      No problem-specific Assessment & Plan notes found for this encounter  PHQ-2/9 Depression Screening    Little interest or pleasure in doing things: 0 - not at all  Feeling down, depressed, or hopeless: 0 - not at all  PHQ-2 Score: 0  PHQ-2 Interpretation: Negative depression screen          Body mass index is 28 73 kg/m²  BMI Counseling: Body mass index is 28 73 kg/m²  The BMI   Subjective:      Patient ID: Sangeetha Garcia is a 20 y o  male  Patient presents accompanied by mother for four-month checkup on diabetes mellitus type 2      The following portions of the patient's history were reviewed and updated as appropriate:   He has a past medical history of Allergic, Autism, Diabetes mellitus ([address] ), and Seasonal allergies  ,  does not have any pertinent problems on file  ,   has a past surgical history that includes Cataract extraction, bilateral ,  family history includes Alcohol abuse in his maternal grandmother; Mental illness in his maternal aunt; No Known Problems in his father and mother  ,   reports that he is a non-smoker but has been exposed to tobacco smoke  He has never used smokeless tobacco  He reports that he does not drink alcohol and does not use drugs  ,  has No Known Allergies     Current Outpatient Medications   Medication Sig Dispense Refill    benzoyl peroxide 5 % gel Apply topically 2 (two) times a day 45 g 5    clindamycin (CLEOCIN T) 1 % external solution Apply topically 2 (two) times a day 60 mL 5    doxycycline hyclate (VIBRAMYCIN) 100 mg capsule Take 1 capsule (100 mg total) by mouth in the morning 90 capsule 1    fluticasone (FLONASE) 50 mcg/act nasal spray 2 sprays into each nostril daily 48 g 3    metFORMIN (GLUCOPHAGE) 1000 MG tablet Take 1 tablet (1,000 mg total) by mouth 2 (two) times a day 180 tablet 1    Apple Cider Vinegar 600 MG CAPS Take 1,200 mg by mouth 3 (three) times a day with meals      ascorbic acid (VITAMIN C) 250 mg tablet Take 250 mg by mouth daily      cetirizine (ZyrTEC) 10 mg tablet Take 1 tablet (10 mg total) by mouth daily 90 tablet 1    Cyanocobalamin (VITAMIN B-12) 6000 MCG SUBL Place 6,000 mcg under the tongue daily      NON FORMULARY Medical Marijuana      zinc sulfate (ZINCATE) 220 mg capsule Take 220 mg by mouth daily       No current facility-administered medications for this visit  Review of Systems   Constitutional: Negative for chills and fever  HENT: Negative for ear pain and sore throat  Eyes: Negative for pain and visual disturbance  Respiratory: Negative for cough and shortness of breath  Cardiovascular: Negative for chest pain and palpitations  Gastrointestinal: Negative for abdominal pain and vomiting  Genitourinary: Negative for dysuria and hematuria  Musculoskeletal: Negative for arthralgias and back pain  Skin: Negative for color change and rash  Neurological: Negative for seizures and syncope  All other systems reviewed and are negative  Objective:    /84   Pulse 80   Temp 98 °F (36 7 °C)   Resp 20   Ht 5' 6" (1 676 m)   Wt 80 7 kg (178 lb)   BMI 28 73 kg/m²   Body mass index is 28 73 kg/m²  Physical Exam  Constitutional:       Appearance: He is well-developed  HENT:      Head: Normocephalic  Eyes:      Pupils: Pupils are equal, round, and reactive to light  Cardiovascular:      Rate and Rhythm: Normal rate and regular rhythm  Heart sounds: Normal heart sounds  Pulmonary:      Effort: Pulmonary effort is normal       Breath sounds: Normal breath sounds  Abdominal:      General: Bowel sounds are normal       Palpations: Abdomen is soft  Tenderness: There is no abdominal tenderness  Musculoskeletal:      Cervical back: Normal range of motion     Skin: General: Skin is warm  Neurological:      Mental Status: He is alert and oriented to person, place, and time

## 2022-09-06 DIAGNOSIS — J30.2 SEASONAL ALLERGIES: ICD-10-CM

## 2022-09-06 RX ORDER — CETIRIZINE HYDROCHLORIDE 10 MG/1
TABLET, FILM COATED ORAL
Qty: 90 TABLET | Refills: 0 | Status: SHIPPED | OUTPATIENT
Start: 2022-09-06

## 2022-11-28 ENCOUNTER — VBI (OUTPATIENT)
Dept: ADMINISTRATIVE | Facility: OTHER | Age: 22
End: 2022-11-28

## 2022-12-10 ENCOUNTER — APPOINTMENT (OUTPATIENT)
Dept: LAB | Facility: HOSPITAL | Age: 22
End: 2022-12-10
Attending: FAMILY MEDICINE

## 2022-12-10 DIAGNOSIS — E55.9 VITAMIN D DEFICIENCY: ICD-10-CM

## 2022-12-10 DIAGNOSIS — E11.9 TYPE 2 DIABETES MELLITUS WITHOUT COMPLICATION, WITHOUT LONG-TERM CURRENT USE OF INSULIN (HCC): ICD-10-CM

## 2022-12-10 LAB
25(OH)D3 SERPL-MCNC: 38 NG/ML (ref 30–100)
ALBUMIN SERPL BCP-MCNC: 4 G/DL (ref 3.5–5)
ALP SERPL-CCNC: 77 U/L (ref 46–116)
ALT SERPL W P-5'-P-CCNC: 37 U/L (ref 12–78)
ANION GAP SERPL CALCULATED.3IONS-SCNC: 9 MMOL/L (ref 4–13)
AST SERPL W P-5'-P-CCNC: 18 U/L (ref 5–45)
BASOPHILS # BLD AUTO: 0.03 THOUSANDS/ÂΜL (ref 0–0.1)
BASOPHILS NFR BLD AUTO: 0 % (ref 0–1)
BILIRUB SERPL-MCNC: 0.39 MG/DL (ref 0.2–1)
BUN SERPL-MCNC: 13 MG/DL (ref 5–25)
CALCIUM SERPL-MCNC: 9.3 MG/DL (ref 8.3–10.1)
CHLORIDE SERPL-SCNC: 105 MMOL/L (ref 96–108)
CO2 SERPL-SCNC: 23 MMOL/L (ref 21–32)
CREAT SERPL-MCNC: 0.84 MG/DL (ref 0.6–1.3)
EOSINOPHIL # BLD AUTO: 0.08 THOUSAND/ÂΜL (ref 0–0.61)
EOSINOPHIL NFR BLD AUTO: 1 % (ref 0–6)
ERYTHROCYTE [DISTWIDTH] IN BLOOD BY AUTOMATED COUNT: 12.7 % (ref 11.6–15.1)
GFR SERPL CREATININE-BSD FRML MDRD: 123 ML/MIN/1.73SQ M
GLUCOSE P FAST SERPL-MCNC: 190 MG/DL (ref 65–99)
HCT VFR BLD AUTO: 45 % (ref 36.5–49.3)
HGB BLD-MCNC: 14.7 G/DL (ref 12–17)
IMM GRANULOCYTES # BLD AUTO: 0.01 THOUSAND/UL (ref 0–0.2)
IMM GRANULOCYTES NFR BLD AUTO: 0 % (ref 0–2)
LYMPHOCYTES # BLD AUTO: 2.23 THOUSANDS/ÂΜL (ref 0.6–4.47)
LYMPHOCYTES NFR BLD AUTO: 32 % (ref 14–44)
MCH RBC QN AUTO: 29.2 PG (ref 26.8–34.3)
MCHC RBC AUTO-ENTMCNC: 32.7 G/DL (ref 31.4–37.4)
MCV RBC AUTO: 89 FL (ref 82–98)
MONOCYTES # BLD AUTO: 0.36 THOUSAND/ÂΜL (ref 0.17–1.22)
MONOCYTES NFR BLD AUTO: 5 % (ref 4–12)
NEUTROPHILS # BLD AUTO: 4.25 THOUSANDS/ÂΜL (ref 1.85–7.62)
NEUTS SEG NFR BLD AUTO: 62 % (ref 43–75)
NRBC BLD AUTO-RTO: 0 /100 WBCS
PLATELET # BLD AUTO: 265 THOUSANDS/UL (ref 149–390)
PMV BLD AUTO: 10.4 FL (ref 8.9–12.7)
POTASSIUM SERPL-SCNC: 4.4 MMOL/L (ref 3.5–5.3)
PROT SERPL-MCNC: 7.8 G/DL (ref 6.4–8.4)
RBC # BLD AUTO: 5.04 MILLION/UL (ref 3.88–5.62)
SODIUM SERPL-SCNC: 137 MMOL/L (ref 135–147)
TSH SERPL DL<=0.05 MIU/L-ACNC: 1.07 UIU/ML (ref 0.45–4.5)
WBC # BLD AUTO: 6.96 THOUSAND/UL (ref 4.31–10.16)

## 2022-12-11 LAB
EST. AVERAGE GLUCOSE BLD GHB EST-MCNC: 160 MG/DL
HBA1C MFR BLD: 7.2 %

## 2023-01-01 NOTE — ASSESSMENT & PLAN NOTE
Lab Results   Component Value Date    HGBA1C 6 9 (H) 07/21/2020    Patient has type 2 diabetes mellitus  His weight is increased by 6 lb since his last visit  His fasting blood sugars elevated at 165  His hemoglobin A1c is elevated at 6 9  I asked the patient to get back on track with his diet  I want to see his weight down under 170 lb by the time he returns in 4 months  I want his hemoglobin A1c at 6 5 or less, given his young age  Although he is only 23years old, he has documented type 2 diabetes mellitus since he was 12  At that time, he had been under the care of a different physician  His diabetes has been well controlled on metformin  Hopefully, we can avoid having to add another agent  We discussed routine diabetic foot care  I also recommended a dilated eye exam yearly    I counseled the patient regarding starting an exercise program 
Patient is currently not on statin therapy  A fasting lipid panel has been ordered  His goal LDL cholesterol is less than 70  If he does not meet this goal, I will need to speak with his mother about starting statin therapy 
No

## 2023-01-09 ENCOUNTER — OFFICE VISIT (OUTPATIENT)
Dept: FAMILY MEDICINE CLINIC | Facility: CLINIC | Age: 23
End: 2023-01-09

## 2023-01-09 VITALS
SYSTOLIC BLOOD PRESSURE: 126 MMHG | HEART RATE: 76 BPM | HEIGHT: 66 IN | WEIGHT: 180 LBS | DIASTOLIC BLOOD PRESSURE: 76 MMHG | TEMPERATURE: 97.7 F | RESPIRATION RATE: 20 BRPM | BODY MASS INDEX: 28.93 KG/M2

## 2023-01-09 DIAGNOSIS — E11.9 TYPE 2 DIABETES MELLITUS WITHOUT COMPLICATION, WITHOUT LONG-TERM CURRENT USE OF INSULIN (HCC): ICD-10-CM

## 2023-01-09 DIAGNOSIS — Z00.00 ANNUAL PHYSICAL EXAM: Primary | ICD-10-CM

## 2023-01-09 DIAGNOSIS — F63.81 INTERMITTENT EXPLOSIVE DISORDER: ICD-10-CM

## 2023-01-09 DIAGNOSIS — Z11.59 NEED FOR HEPATITIS C SCREENING TEST: ICD-10-CM

## 2023-01-09 DIAGNOSIS — E78.5 DYSLIPIDEMIA: ICD-10-CM

## 2023-01-09 DIAGNOSIS — J30.2 SEASONAL ALLERGIES: ICD-10-CM

## 2023-01-09 DIAGNOSIS — F84.0 AUTISM: ICD-10-CM

## 2023-01-09 DIAGNOSIS — L70.0 ACNE VULGARIS: ICD-10-CM

## 2023-01-09 DIAGNOSIS — E55.9 VITAMIN D DEFICIENCY: ICD-10-CM

## 2023-01-09 DIAGNOSIS — Z11.3 SCREEN FOR STD (SEXUALLY TRANSMITTED DISEASE): ICD-10-CM

## 2023-01-09 RX ORDER — BLOOD-GLUCOSE METER
KIT MISCELLANEOUS
Qty: 1 KIT | Refills: 0 | Status: SHIPPED | OUTPATIENT
Start: 2023-01-09

## 2023-01-09 RX ORDER — LANCETS 33 GAUGE
EACH MISCELLANEOUS
Qty: 100 EACH | Refills: 3 | Status: SHIPPED | OUTPATIENT
Start: 2023-01-09

## 2023-01-09 RX ORDER — BLOOD SUGAR DIAGNOSTIC
STRIP MISCELLANEOUS
Qty: 100 EACH | Refills: 3 | Status: SHIPPED | OUTPATIENT
Start: 2023-01-09

## 2023-01-09 RX ORDER — CETIRIZINE HYDROCHLORIDE 10 MG/1
10 TABLET ORAL DAILY
Qty: 90 TABLET | Refills: 1 | Status: SHIPPED | OUTPATIENT
Start: 2023-01-09

## 2023-01-09 NOTE — PATIENT INSTRUCTIONS

## 2023-01-09 NOTE — PROGRESS NOTES
ADULT ANNUAL [id number] [address] PRIMARY CARE    NAME: Javan Olszewski  AGE: 20 y o  SEX: male  : 2000     DATE: 2023     Assessment and Plan: diabetes mellitus type 2  With blood sugar fairly controlled and hemoglobin A1c of 7 2    Autism at baseline    Acne vulgaris treated with Cleocin T and Vibramycin with good results    Intermittent explosive disorder treated by Behavioral Health    Dyslipidemia laboratory is pending    Patient mother defers diabetic foot testing she also defers screening for HIV and hepatitis-C stating that her son is not sexually active     Problem List Items Addressed This Visit        Endocrine    Type 2 diabetes mellitus without complication, without long-term current use of insulin ([address] )       Other    Seasonal allergies       Immunizations and preventive care screenings were discussed with patient today  Appropriate education was printed on patient's after visit summary  Counseling:  Alcohol/drug use: discussed moderation in alcohol intake, the recommendations for healthy alcohol use, and avoidance of illicit drug use  Dental Health: discussed importance of regular tooth brushing, flossing, and dental visits  · Injury prevention: discussed safety/seat belts, safety helmets, smoke detectors, carbon dioxide detectors, and smoking near bedding or upholstery  BMI Counseling: Body mass index is 29 05 kg/m²  The BMI is above normal  Nutrition recommendations include decreasing portion sizes, encouraging healthy choices of fruits and vegetables, decreasing fast food intake, consuming healthier snacks, limiting drinks that contain sugar, moderation in carbohydrate intake, increasing intake of lean protein, reducing intake of saturated and trans fat and reducing intake of cholesterol  Exercise recommendations include moderate physical activity 150 minutes/week   Rationale for BMI follow-up plan is due to patient being overweight or obese  No follow-ups on file  Chief Complaint:     Chief Complaint   Patient presents with   • Annual Exam   • Follow-up     4 month check up -- no new concerns   • Medication Refill      History of Present Illness:     Adult Annual Physical   Patient here for a comprehensive physical exam  The patient reports no problems  Diet and Physical Activity  · Diet/Nutrition: diabetic diet  · Exercise: no formal exercise  Depression Screening  PHQ-2/9 Depression Screening         General Health  · Sleep: sleeps well and gets 7-8 hours of sleep on average  · Hearing: normal - bilateral   · Vision: wears glasses  · Dental: no dental visits for >1 year and brushes teeth once daily   Health  · History of STDs?: no      Review of Systems:     Review of Systems   Constitutional: Negative for chills and fever  HENT: Negative for ear pain and sore throat  Eyes: Negative for pain and visual disturbance  Respiratory: Negative for cough and shortness of breath  Cardiovascular: Negative for chest pain and palpitations  Gastrointestinal: Negative for abdominal pain and vomiting  Genitourinary: Negative for dysuria and hematuria  Musculoskeletal: Negative for arthralgias and back pain  Skin: Negative for color change and rash  Neurological: Negative for seizures and syncope  All other systems reviewed and are negative       Past Medical History:     Past Medical History:   Diagnosis Date   • Allergic    • Autism    • Diabetes mellitus ([facility] )     type 2 without complications   • Seasonal allergies       Past Surgical History:     Past Surgical History:   Procedure Laterality Date   • CATARACT EXTRACTION, BILATERAL        Social History:     Social History     Socioeconomic History   • Marital status: Single     Spouse name: None   • Number of children: None   • Years of education: None   • Highest education level: None   Occupational History   • None   Tobacco Use   • Smoking status: Passive Smoke Exposure - Never Smoker   • Smokeless tobacco: Never   Vaping Use   • Vaping Use: Never used   Substance and Sexual Activity   • Alcohol use: No   • Drug use: No   • Sexual activity: None   Other Topics Concern   • None   Social History Narrative   • None     Social Determinants of Health     Financial Resource Strain: Not on file   Food Insecurity: Not on file   Transportation Needs: Not on file   Physical Activity: Not on file   Stress: Not on file   Social Connections: Not on file   Intimate Partner Violence: Not on file   Housing Stability: Not on file      Family History:     Family History   Problem Relation Age of Onset   • No Known Problems Mother    • No Known Problems Father    • Alcohol abuse Maternal Grandmother    • Mental illness Maternal Aunt       Current Medications:     Current Outpatient Medications   Medication Sig Dispense Refill   • EQ Allergy Relief, Cetirizine, 10 MG tablet Take 1 tablet by mouth once daily 90 tablet 0   • Apple Cider Vinegar 600 MG CAPS Take 1,200 mg by mouth 3 (three) times a day with meals     • ascorbic acid (VITAMIN C) 250 mg tablet Take 250 mg by mouth daily     • benzoyl peroxide 5 % gel Apply topically 2 (two) times a day 45 g 5   • clindamycin (CLEOCIN T) 1 % external solution Apply topically 2 (two) times a day 60 mL 5   • Cyanocobalamin (VITAMIN B-12) 6000 MCG SUBL Place 6,000 mcg under the tongue daily     • doxycycline hyclate (VIBRAMYCIN) 100 mg capsule Take 1 capsule (100 mg total) by mouth in the morning 90 capsule 1   • fluticasone (FLONASE) 50 mcg/act nasal spray 2 sprays into each nostril daily 48 g 3   • metFORMIN (GLUCOPHAGE) 1000 MG tablet Take 1 tablet (1,000 mg total) by mouth 2 (two) times a day 180 tablet 1   • NON FORMULARY Medical Marijuana     • zinc sulfate (ZINCATE) 220 mg capsule Take 220 mg by mouth daily       No current facility-administered medications for this visit        Allergies:     No Known Allergies Physical Exam:     /76   Pulse 76   Temp 97 7 °F (36 5 °C)   Resp 20   Ht 5' 6" (1 676 m)   Wt 81 6 kg (180 lb)   BMI 29 05 kg/m²     Physical Exam  Vitals and nursing note reviewed  Constitutional:       General: He is not in acute distress  Appearance: He is well-developed  HENT:      Head: Normocephalic and atraumatic  Eyes:      Conjunctiva/sclera: Conjunctivae normal    Cardiovascular:      Rate and Rhythm: Normal rate and regular rhythm  Heart sounds: No murmur heard  Pulmonary:      Effort: Pulmonary effort is normal  No respiratory distress  Breath sounds: Normal breath sounds  Abdominal:      Palpations: Abdomen is soft  Tenderness: There is no abdominal tenderness  Musculoskeletal:         General: No swelling  Cervical back: Neck supple  Skin:     General: Skin is warm and dry  Capillary Refill: Capillary refill takes less than 2 seconds  Neurological:      Mental Status: He is alert     Psychiatric:         Mood and Affect: Mood normal           Champ Kelly DO   [facility] PRIMARY CARE

## 2023-02-03 ENCOUNTER — VBI (OUTPATIENT)
Dept: ADMINISTRATIVE | Facility: OTHER | Age: 23
End: 2023-02-03

## 2023-03-17 ENCOUNTER — OFFICE VISIT (OUTPATIENT)
Dept: URGENT CARE | Facility: CLINIC | Age: 23
End: 2023-03-17

## 2023-03-17 VITALS
HEART RATE: 124 BPM | BODY MASS INDEX: 28.48 KG/M2 | DIASTOLIC BLOOD PRESSURE: 85 MMHG | OXYGEN SATURATION: 97 % | TEMPERATURE: 98.2 F | HEIGHT: 66 IN | RESPIRATION RATE: 20 BRPM | WEIGHT: 177.2 LBS | SYSTOLIC BLOOD PRESSURE: 137 MMHG

## 2023-03-17 DIAGNOSIS — J20.9 ACUTE BRONCHITIS, UNSPECIFIED ORGANISM: Primary | ICD-10-CM

## 2023-03-17 RX ORDER — AZITHROMYCIN 500 MG/1
TABLET, FILM COATED ORAL
Qty: 5 TABLET | Refills: 0 | Status: SHIPPED | OUTPATIENT
Start: 2023-03-17 | End: 2023-03-21

## 2023-03-17 RX ORDER — PREDNISONE 10 MG/1
TABLET ORAL
Qty: 26 TABLET | Refills: 0 | Status: SHIPPED | OUTPATIENT
Start: 2023-03-17

## 2023-03-17 RX ORDER — BENZONATATE 200 MG/1
200 CAPSULE ORAL 3 TIMES DAILY PRN
Qty: 20 CAPSULE | Refills: 0 | Status: SHIPPED | OUTPATIENT
Start: 2023-03-17

## 2023-03-17 NOTE — PROGRESS NOTES
[address]    NAME: Pauline Carias is a 20 y o  male  : 2000    MRN: [id number]  DATE: 2023  TIME: 5:37 PM    Assessment and Plan   Acute bronchitis, unspecified organism [J20 9]  1  Acute bronchitis, unspecified organism  azithromycin (ZITHROMAX) 500 MG tablet    predniSONE 10 mg tablet    benzonatate (TESSALON) 200 MG capsule          Patient Instructions     Patient Instructions   I have prescribed an antibiotic for the infection  Please take the antibiotic as prescribed and finish the entire prescription  I recommend that the patient takes an over the counter probiotic or eats yogurt with live cultures in it [place]) to keep good bacteria in the gut and help prevent diarrhea  Wash hands frequently to prevent the spread of infection  Can use over the counter cough and cold medications to help with symptoms  Ibuprofen and/or tylenol as needed for pain or fever  If not improving over the next 7-10 days, follow up with PCP  Chief Complaint     Chief Complaint   Patient presents with   • Cough     Since last week- mom unsure if productive, pt is non verbal       • Nasal Congestion     Since last week       History of Present Illness   Patient is a 19 yo male with PMH of autism and DM2 brought into office by parents presenting with cough x1 week  Mother states that he has been constantly coughing, unsure if productive or nonproductive  Yesterday, he had 1 episode of vomiting secondary to coughing  Coughing wakes him up at night  Denies fevers, abdominal pain, sore throat  He has been taking Mucinex and diabetic Robitussin with no symptom improvement  Mother just had bronchitis  Denied COVID test       Review of Systems   Review of Systems   Constitutional: Negative for chills, fatigue and fever  HENT: Positive for postnasal drip  Negative for congestion, ear pain, sinus pressure and sore throat  Respiratory: Positive for cough   Negative for shortness of breath and wheezing  Gastrointestinal: Negative for constipation, diarrhea and vomiting  Neurological: Negative for headaches  All other systems reviewed and are negative  Current Medications     Current Outpatient Medications:   •  Apple Cider Vinegar 600 MG CAPS, Take 1,200 mg by mouth 3 (three) times a day with meals, Disp: , Rfl:   •  ascorbic acid (VITAMIN C) 250 mg tablet, Take 250 mg by mouth daily, Disp: , Rfl:   •  azithromycin (ZITHROMAX) 500 MG tablet, Give 1 tablet daily for 5 days, Disp: 5 tablet, Rfl: 0  •  benzonatate (TESSALON) 200 MG capsule, Take 1 capsule (200 mg total) by mouth 3 (three) times a day as needed for cough, Disp: 20 capsule, Rfl: 0  •  benzoyl peroxide 5 % gel, Apply topically 2 (two) times a day, Disp: 45 g, Rfl: 5  •  Blood Glucose Monitoring Suppl (OneTouch Verio Reflect) w/Device KIT, Check blood sugars once daily  Please substitute with appropriate alternative as covered by patient's insurance  Dx: E11 65, Disp: 1 kit, Rfl: 0  •  cetirizine (EQ Allergy Relief, Cetirizine,) 10 mg tablet, Take 1 tablet (10 mg total) by mouth daily, Disp: 90 tablet, Rfl: 1  •  clindamycin (CLEOCIN T) 1 % external solution, Apply topically 2 (two) times a day, Disp: 60 mL, Rfl: 5  •  Cyanocobalamin (VITAMIN B-12) 6000 MCG SUBL, Place 6,000 mcg under the tongue daily, Disp: , Rfl:   •  fluticasone (FLONASE) 50 mcg/act nasal spray, 2 sprays into each nostril daily, Disp: 48 g, Rfl: 3  •  glucose blood (OneTouch Verio) test strip, Check blood sugars once daily  Please substitute with appropriate alternative as covered by patient's insurance  Dx: E11 65, Disp: 100 each, Rfl: 3  •  metFORMIN (GLUCOPHAGE) 1000 MG tablet, Take 1 tablet (1,000 mg total) by mouth 2 (two) times a day, Disp: 180 tablet, Rfl: 1  •  NON FORMULARY, Medical Marijuana, Disp: , Rfl:   •  OneTouch Delica Lancets 72C MISC, Check blood sugars once daily  Please substitute with appropriate alternative as covered by patient's insurance  Dx: E11 65, Disp: 100 each, Rfl: 3  •  predniSONE 10 mg tablet, Take 3 tabs BID X 2 days, 2 tabs BID X 2 days, 1 tab BID X 2 days, 1 tab daily X 2 days, Disp: 26 tablet, Rfl: 0  •  zinc sulfate (ZINCATE) 220 mg capsule, Take 220 mg by mouth daily, Disp: , Rfl:     Current Allergies     Allergies as of 03/17/2023   • (No Known Allergies)          The following portions of the patient's history were reviewed and updated as appropriate: allergies, current medications, past family history, past medical history, past social history, past surgical history and problem list    Past Medical History:   Diagnosis Date   • Allergic    • Autism    • Diabetes mellitus ([facility] )     type 2 without complications   • Seasonal allergies      Past Surgical History:   Procedure Laterality Date   • CATARACT EXTRACTION, BILATERAL       Family History   Problem Relation Age of Onset   • No Known Problems Mother    • No Known Problems Father    • Alcohol abuse Maternal Grandmother    • Mental illness Maternal Aunt      Social History     Socioeconomic History   • Marital status: Single     Spouse name: Not on file   • Number of children: Not on file   • Years of education: Not on file   • Highest education level: Not on file   Occupational History   • Not on file   Tobacco Use   • Smoking status: Never     Passive exposure:  Yes   • Smokeless tobacco: Never   Vaping Use   • Vaping Use: Never used   Substance and Sexual Activity   • Alcohol use: No   • Drug use: No   • Sexual activity: Not on file   Other Topics Concern   • Not on file   Social History Narrative    Parents smoke in the house, in basement     Social Determinants of Health     Financial Resource Strain: Not on file   Food Insecurity: Not on file   Transportation Needs: Not on file   Physical Activity: Not on file   Stress: Not on file   Social Connections: Not on file   Intimate Partner Violence: Not on file   Housing Stability: Not on file     Medications have been verified  Objective   /85   Pulse (!) 124 Comment: anxious  Temp 98 2 °F (36 8 °C)   Resp 20   Ht 5' 6" (1 676 m)   Wt 80 4 kg (177 lb 3 2 oz)   SpO2 97%   BMI 28 60 kg/m²      Physical Exam   Physical Exam  Vitals reviewed  Constitutional:       General: He is not in acute distress  Appearance: Normal appearance  He is well-developed  HENT:      Head: Normocephalic and atraumatic  Right Ear: Tympanic membrane normal       Left Ear: Tympanic membrane normal       Nose: Rhinorrhea present  No mucosal edema or congestion  Mouth/Throat:      Mouth: Mucous membranes are moist       Pharynx: Oropharynx is clear  No oropharyngeal exudate  Eyes:      Extraocular Movements: Extraocular movements intact  Conjunctiva/sclera: Conjunctivae normal       Pupils: Pupils are equal, round, and reactive to light  Cardiovascular:      Rate and Rhythm: Normal rate and regular rhythm  Heart sounds: Normal heart sounds  Pulmonary:      Effort: Pulmonary effort is normal  No respiratory distress  Breath sounds: Normal breath sounds  Neurological:      Mental Status: He is alert

## 2023-04-22 ENCOUNTER — APPOINTMENT (OUTPATIENT)
Dept: LAB | Facility: HOSPITAL | Age: 23
End: 2023-04-22
Attending: FAMILY MEDICINE

## 2023-04-22 DIAGNOSIS — E55.9 VITAMIN D DEFICIENCY: ICD-10-CM

## 2023-04-22 DIAGNOSIS — E11.9 TYPE 2 DIABETES MELLITUS WITHOUT COMPLICATION, WITHOUT LONG-TERM CURRENT USE OF INSULIN (HCC): ICD-10-CM

## 2023-04-22 LAB
25(OH)D3 SERPL-MCNC: 29.2 NG/ML (ref 30–100)
ALBUMIN SERPL BCP-MCNC: 4.5 G/DL (ref 3.5–5)
ALP SERPL-CCNC: 61 U/L (ref 34–104)
ALT SERPL W P-5'-P-CCNC: 17 U/L (ref 7–52)
ANION GAP SERPL CALCULATED.3IONS-SCNC: 10 MMOL/L (ref 4–13)
AST SERPL W P-5'-P-CCNC: 14 U/L (ref 13–39)
BASOPHILS # BLD AUTO: 0.02 THOUSANDS/ΜL (ref 0–0.1)
BASOPHILS NFR BLD AUTO: 0 % (ref 0–1)
BILIRUB SERPL-MCNC: 0.42 MG/DL (ref 0.2–1)
BUN SERPL-MCNC: 11 MG/DL (ref 5–25)
CALCIUM SERPL-MCNC: 9.4 MG/DL (ref 8.4–10.2)
CHLORIDE SERPL-SCNC: 103 MMOL/L (ref 96–108)
CHOLEST SERPL-MCNC: 153 MG/DL
CO2 SERPL-SCNC: 24 MMOL/L (ref 21–32)
CREAT SERPL-MCNC: 0.74 MG/DL (ref 0.6–1.3)
CREAT UR-MCNC: 201 MG/DL
EOSINOPHIL # BLD AUTO: 0.05 THOUSAND/ΜL (ref 0–0.61)
EOSINOPHIL NFR BLD AUTO: 1 % (ref 0–6)
ERYTHROCYTE [DISTWIDTH] IN BLOOD BY AUTOMATED COUNT: 12.9 % (ref 11.6–15.1)
EST. AVERAGE GLUCOSE BLD GHB EST-MCNC: 180 MG/DL
GFR SERPL CREATININE-BSD FRML MDRD: 130 ML/MIN/1.73SQ M
GLUCOSE P FAST SERPL-MCNC: 157 MG/DL (ref 65–99)
HBA1C MFR BLD: 7.9 %
HCT VFR BLD AUTO: 43.5 % (ref 36.5–49.3)
HDLC SERPL-MCNC: 47 MG/DL
HGB BLD-MCNC: 14.6 G/DL (ref 12–17)
IMM GRANULOCYTES # BLD AUTO: 0.01 THOUSAND/UL (ref 0–0.2)
IMM GRANULOCYTES NFR BLD AUTO: 0 % (ref 0–2)
LDLC SERPL CALC-MCNC: 84 MG/DL (ref 0–100)
LYMPHOCYTES # BLD AUTO: 2.24 THOUSANDS/ΜL (ref 0.6–4.47)
LYMPHOCYTES NFR BLD AUTO: 42 % (ref 14–44)
MCH RBC QN AUTO: 29.9 PG (ref 26.8–34.3)
MCHC RBC AUTO-ENTMCNC: 33.6 G/DL (ref 31.4–37.4)
MCV RBC AUTO: 89 FL (ref 82–98)
MICROALBUMIN UR-MCNC: 14.9 MG/L (ref 0–20)
MICROALBUMIN/CREAT 24H UR: 7 MG/G CREATININE (ref 0–30)
MONOCYTES # BLD AUTO: 0.33 THOUSAND/ΜL (ref 0.17–1.22)
MONOCYTES NFR BLD AUTO: 6 % (ref 4–12)
NEUTROPHILS # BLD AUTO: 2.66 THOUSANDS/ΜL (ref 1.85–7.62)
NEUTS SEG NFR BLD AUTO: 51 % (ref 43–75)
NRBC BLD AUTO-RTO: 0 /100 WBCS
PLATELET # BLD AUTO: 230 THOUSANDS/UL (ref 149–390)
PMV BLD AUTO: 10.3 FL (ref 8.9–12.7)
POTASSIUM SERPL-SCNC: 4 MMOL/L (ref 3.5–5.3)
PROT SERPL-MCNC: 6.8 G/DL (ref 6.4–8.4)
RBC # BLD AUTO: 4.89 MILLION/UL (ref 3.88–5.62)
SODIUM SERPL-SCNC: 137 MMOL/L (ref 135–147)
TRIGL SERPL-MCNC: 111 MG/DL
TSH SERPL DL<=0.05 MIU/L-ACNC: 1.14 UIU/ML (ref 0.45–4.5)
WBC # BLD AUTO: 5.31 THOUSAND/UL (ref 4.31–10.16)

## 2023-05-08 ENCOUNTER — OFFICE VISIT (OUTPATIENT)
Dept: FAMILY MEDICINE CLINIC | Facility: CLINIC | Age: 23
End: 2023-05-08

## 2023-05-08 ENCOUNTER — TELEPHONE (OUTPATIENT)
Dept: FAMILY MEDICINE CLINIC | Facility: CLINIC | Age: 23
End: 2023-05-08

## 2023-05-08 VITALS
RESPIRATION RATE: 16 BRPM | SYSTOLIC BLOOD PRESSURE: 126 MMHG | TEMPERATURE: 98.2 F | BODY MASS INDEX: 28.12 KG/M2 | HEIGHT: 66 IN | WEIGHT: 175 LBS | HEART RATE: 84 BPM | DIASTOLIC BLOOD PRESSURE: 84 MMHG

## 2023-05-08 DIAGNOSIS — F84.0 AUTISM: ICD-10-CM

## 2023-05-08 DIAGNOSIS — E55.9 VITAMIN D DEFICIENCY: ICD-10-CM

## 2023-05-08 DIAGNOSIS — L70.0 ACNE VULGARIS: ICD-10-CM

## 2023-05-08 DIAGNOSIS — E11.9 TYPE 2 DIABETES MELLITUS WITHOUT COMPLICATION, WITHOUT LONG-TERM CURRENT USE OF INSULIN (HCC): Primary | ICD-10-CM

## 2023-05-08 DIAGNOSIS — J30.2 SEASONAL ALLERGIES: ICD-10-CM

## 2023-05-08 RX ORDER — FLUTICASONE PROPIONATE 50 MCG
2 SPRAY, SUSPENSION (ML) NASAL DAILY
Qty: 48 G | Refills: 3 | Status: SHIPPED | OUTPATIENT
Start: 2023-05-08

## 2023-05-08 RX ORDER — CETIRIZINE HYDROCHLORIDE 10 MG/1
10 TABLET ORAL DAILY
Qty: 90 TABLET | Refills: 1 | Status: SHIPPED | OUTPATIENT
Start: 2023-05-08

## 2023-05-08 RX ORDER — GLIMEPIRIDE 1 MG/1
1 TABLET ORAL
Qty: 90 TABLET | Refills: 3 | Status: SHIPPED | OUTPATIENT
Start: 2023-05-08 | End: 2024-05-02

## 2023-05-08 RX ORDER — ERGOCALCIFEROL 1.25 MG/1
50000 CAPSULE ORAL WEEKLY
Qty: 12 CAPSULE | Refills: 0 | Status: SHIPPED | OUTPATIENT
Start: 2023-05-08

## 2023-05-08 RX ORDER — CLINDAMYCIN PHOSPHATE 11.9 MG/ML
SOLUTION TOPICAL 2 TIMES DAILY
Qty: 60 ML | Refills: 5 | Status: SHIPPED | OUTPATIENT
Start: 2023-05-08

## 2023-05-08 NOTE — PROGRESS NOTES
Assessment/Plan: Diabetes mellitus type 2 with a hemoglobin A1c at 7 9 currently on metformin 1000 mg twice daily we will add Rybelsus to the regimen  Total cholesterol is 153 triglycerides 111 HDL 47 and LDL 84 currently not on statin therapy    Vitamin D deficiency at 29 9 we will megadose vitamin D at 50,000 units weekly for 3 months and then 1000 units daily we will recheck a vitamin D level in 4 months    Acne vulgaris Cleocin topical and benzoyl peroxide are effective    Seasonal allergies treated with cetirizine with good effect        Problem List Items Addressed This Visit        Endocrine    Type 2 diabetes mellitus without complication, without long-term current use of insulin ([facility]) - Primary    Relevant Medications    metFORMIN (GLUCOPHAGE) 1000 MG tablet    semaglutide (Rybelsus) 3 MG tablet    semaglutide (Rybelsus) 7 MG tablet (Start on 6/7/2023)    Other Relevant Orders    Hemoglobin A1C    Comprehensive metabolic panel    CBC and differential    Microalbumin / creatinine urine ratio    TSH, 3rd generation with Free T4 reflex       Musculoskeletal and Integument    Acne vulgaris    Relevant Medications    benzoyl peroxide 5 % gel    clindamycin (CLEOCIN T) 1 % external solution       Other    Seasonal allergies    Relevant Medications    cetirizine (EQ Allergy Relief, Cetirizine,) 10 mg tablet    fluticasone (FLONASE) 50 mcg/act nasal spray    Autism   Other Visit Diagnoses     Vitamin D deficiency        Relevant Medications    ergocalciferol (VITAMIN D2) 50,000 units    Other Relevant Orders    Vitamin D 25 hydroxy           Diagnoses and all orders for this visit:    Type 2 diabetes mellitus without complication, without long-term current use of insulin ([facility])  -     metFORMIN (GLUCOPHAGE) 1000 MG tablet; Take 1 tablet (1,000 mg total) by mouth 2 (two) times a day  -     semaglutide (Rybelsus) 3 MG tablet;  Take 1 tablet (3 mg total) by mouth daily Take on an empty stomach with 4 oz water, and wait 30 minutes before eating  -     semaglutide (Rybelsus) 7 MG tablet; Take 1 tablet (7 mg total) by mouth daily Take on an empty stomach with 4 oz water, and wait 30 minutes before eating Do not start before June 7, 2023   -     Hemoglobin A1C; Future  -     Comprehensive metabolic panel; Future  -     CBC and differential; Future  -     Microalbumin / creatinine urine ratio; Future  -     TSH, 3rd generation with Free T4 reflex; Future    Acne vulgaris  -     benzoyl peroxide 5 % gel; Apply topically 2 (two) times a day  -     clindamycin (CLEOCIN T) 1 % external solution; Apply topically 2 (two) times a day    Seasonal allergies  -     cetirizine (EQ Allergy Relief, Cetirizine,) 10 mg tablet; Take 1 tablet (10 mg total) by mouth daily  -     fluticasone (FLONASE) 50 mcg/act nasal spray; 2 sprays into each nostril daily    Autism    Vitamin D deficiency  -     ergocalciferol (VITAMIN D2) 50,000 units; Take 1 capsule (50,000 Units total) by mouth once a week  -     Vitamin D 25 hydroxy; Future      No problem-specific Assessment & Plan notes found for this encounter  PHQ-2/9 Depression Screening            Body mass index is 28 25 kg/m²  BMI Counseling: Body mass index is 28 25 kg/m²  The BMI   Subjective:      Patient ID: Marcelyn Krabbe is a 20 y o  male  Patient presents accompanied by mother for checkup regarding diabetes      The following portions of the patient's history were reviewed and updated as appropriate:   He has a past medical history of Allergic, Autism, Diabetes mellitus ([address] ), and Seasonal allergies  ,  does not have any pertinent problems on file  ,   has a past surgical history that includes Cataract extraction, bilateral ,  family history includes Alcohol abuse in his maternal grandmother; Mental illness in his maternal aunt; No Known Problems in his father and mother  ,   reports that he has never smoked  He has been exposed to tobacco smoke   He has never used smokeless tobacco  He reports that he does not drink alcohol and does not use drugs  ,  has No Known Allergies     Current Outpatient Medications   Medication Sig Dispense Refill   • Apple Cider Vinegar 600 MG CAPS Take 1,200 mg by mouth 3 (three) times a day with meals     • ascorbic acid (VITAMIN C) 250 mg tablet Take 250 mg by mouth daily     • benzoyl peroxide 5 % gel Apply topically 2 (two) times a day 45 g 5   • Blood Glucose Monitoring Suppl (OneTouch Verio Reflect) w/Device KIT Check blood sugars once daily  Please substitute with appropriate alternative as covered by patient's insurance  Dx: E11 65 1 kit 0   • cetirizine (EQ Allergy Relief, Cetirizine,) 10 mg tablet Take 1 tablet (10 mg total) by mouth daily 90 tablet 1   • clindamycin (CLEOCIN T) 1 % external solution Apply topically 2 (two) times a day 60 mL 5   • Cyanocobalamin (VITAMIN B-12) 6000 MCG SUBL Place 6,000 mcg under the tongue daily     • ergocalciferol (VITAMIN D2) 50,000 units Take 1 capsule (50,000 Units total) by mouth once a week 12 capsule 0   • fluticasone (FLONASE) 50 mcg/act nasal spray 2 sprays into each nostril daily 48 g 3   • glucose blood (OneTouch Verio) test strip Check blood sugars once daily  Please substitute with appropriate alternative as covered by patient's insurance  Dx: E11 65 100 each 3   • metFORMIN (GLUCOPHAGE) 1000 MG tablet Take 1 tablet (1,000 mg total) by mouth 2 (two) times a day 180 tablet 1   • OneTouch Delica Lancets 18N MISC Check blood sugars once daily  Please substitute with appropriate alternative as covered by patient's insurance  Dx: E11 65 100 each 3   • semaglutide (Rybelsus) 3 MG tablet Take 1 tablet (3 mg total) by mouth daily Take on an empty stomach with 4 oz water, and wait 30 minutes before eating 30 tablet 0   • [START ON 6/7/2023] semaglutide (Rybelsus) 7 MG tablet Take 1 tablet (7 mg total) by mouth daily Take on an empty stomach with 4 oz water, and wait 30 minutes before eating Do not start before June 7, 2023  "90 tablet 1   • zinc sulfate (ZINCATE) 220 mg capsule Take 220 mg by mouth daily     • benzonatate (TESSALON) 200 MG capsule Take 1 capsule (200 mg total) by mouth 3 (three) times a day as needed for cough (Patient not taking: Reported on 5/8/2023) 20 capsule 0   • gentamicin (GARAMYCIN) 0 3 % ophthalmic solution Administer 1 drop to the right eye every 4 (four) hours (Patient not taking: Reported on 5/8/2023) 5 mL 0   • NON FORMULARY Medical Marijuana     • predniSONE 10 mg tablet Take 3 tabs BID X 2 days, 2 tabs BID X 2 days, 1 tab BID X 2 days, 1 tab daily X 2 days (Patient not taking: Reported on 5/8/2023) 26 tablet 0     No current facility-administered medications for this visit  Review of Systems   Constitutional: Negative for chills and fever  HENT: Negative for ear pain and sore throat  Eyes: Negative for pain and visual disturbance  Respiratory: Negative for cough and shortness of breath  Cardiovascular: Negative for chest pain and palpitations  Gastrointestinal: Negative for abdominal pain and vomiting  Genitourinary: Negative for dysuria and hematuria  Musculoskeletal: Negative for arthralgias and back pain  Skin: Negative for color change and rash  Neurological: Negative for seizures and syncope  All other systems reviewed and are negative  Objective:    /84   Pulse 84   Temp 98 2 °F (36 8 °C)   Resp 16   Ht 5' 6\" (1 676 m)   Wt 79 4 kg (175 lb)   BMI 28 25 kg/m²   Body mass index is 28 25 kg/m²  Physical Exam  Constitutional:       Appearance: Normal appearance  He is well-developed  HENT:      Head: Normocephalic and atraumatic  Right Ear: Tympanic membrane, ear canal and external ear normal       Left Ear: Tympanic membrane, ear canal and external ear normal       Nose: Nose normal       Mouth/Throat:      Mouth: Mucous membranes are moist       Pharynx: Oropharynx is clear  Eyes:      Extraocular Movements: Extraocular movements intact       " Conjunctiva/sclera: Conjunctivae normal       Pupils: Pupils are equal, round, and reactive to light  Cardiovascular:      Rate and Rhythm: Normal rate and regular rhythm  Pulses: Normal pulses  Heart sounds: Normal heart sounds  Pulmonary:      Effort: Pulmonary effort is normal       Breath sounds: Normal breath sounds  Abdominal:      General: Abdomen is flat  Bowel sounds are normal       Palpations: Abdomen is soft  Tenderness: There is no abdominal tenderness  Musculoskeletal:         General: Normal range of motion  Cervical back: Normal range of motion and neck supple  Skin:     General: Skin is warm and dry  Capillary Refill: Capillary refill takes less than 2 seconds  Neurological:      General: No focal deficit present  Mental Status: He is alert and oriented to person, place, and time  Psychiatric:         Mood and Affect: Mood normal          Behavior: Behavior normal          Thought Content:  Thought content normal          Judgment: Judgment normal

## 2023-06-23 DIAGNOSIS — H01.001 BLEPHARITIS OF RIGHT UPPER EYELID, UNSPECIFIED TYPE: ICD-10-CM

## 2023-06-23 RX ORDER — GENTAMICIN SULFATE 3 MG/ML
1 SOLUTION/ DROPS OPHTHALMIC EVERY 4 HOURS
Qty: 5 ML | Refills: 0 | Status: SHIPPED | OUTPATIENT
Start: 2023-06-23

## 2023-06-23 RX ORDER — CEPHALEXIN 500 MG/1
500 CAPSULE ORAL EVERY 6 HOURS SCHEDULED
Qty: 28 CAPSULE | Refills: 0 | Status: SHIPPED | OUTPATIENT
Start: 2023-06-23 | End: 2023-06-23

## 2023-06-23 RX ORDER — CEPHALEXIN 500 MG/1
CAPSULE ORAL
Qty: 28 CAPSULE | Refills: 0 | Status: SHIPPED | OUTPATIENT
Start: 2023-06-23 | End: 2023-06-30

## 2023-08-10 ENCOUNTER — VBI (OUTPATIENT)
Dept: ADMINISTRATIVE | Facility: OTHER | Age: 23
End: 2023-08-10

## 2023-09-02 ENCOUNTER — APPOINTMENT (OUTPATIENT)
Dept: LAB | Facility: HOSPITAL | Age: 23
End: 2023-09-02
Payer: COMMERCIAL

## 2023-09-02 DIAGNOSIS — E55.9 VITAMIN D DEFICIENCY: ICD-10-CM

## 2023-09-02 DIAGNOSIS — E11.9 TYPE 2 DIABETES MELLITUS WITHOUT COMPLICATION, WITHOUT LONG-TERM CURRENT USE OF INSULIN (HCC): ICD-10-CM

## 2023-09-02 LAB
25(OH)D3 SERPL-MCNC: 119.3 NG/ML (ref 30–100)
ALBUMIN SERPL BCP-MCNC: 4.7 G/DL (ref 3.5–5)
ALP SERPL-CCNC: 50 U/L (ref 34–104)
ALT SERPL W P-5'-P-CCNC: 19 U/L (ref 7–52)
ANION GAP SERPL CALCULATED.3IONS-SCNC: 10 MMOL/L
AST SERPL W P-5'-P-CCNC: 16 U/L (ref 13–39)
BASOPHILS # BLD AUTO: 0.04 THOUSANDS/ÂΜL (ref 0–0.1)
BASOPHILS NFR BLD AUTO: 1 % (ref 0–1)
BILIRUB SERPL-MCNC: 0.41 MG/DL (ref 0.2–1)
BUN SERPL-MCNC: 10 MG/DL (ref 5–25)
CALCIUM SERPL-MCNC: 9.6 MG/DL (ref 8.4–10.2)
CHLORIDE SERPL-SCNC: 105 MMOL/L (ref 96–108)
CO2 SERPL-SCNC: 24 MMOL/L (ref 21–32)
CREAT SERPL-MCNC: 0.77 MG/DL (ref 0.6–1.3)
CREAT UR-MCNC: 164.1 MG/DL
EOSINOPHIL # BLD AUTO: 0.09 THOUSAND/ÂΜL (ref 0–0.61)
EOSINOPHIL NFR BLD AUTO: 2 % (ref 0–6)
ERYTHROCYTE [DISTWIDTH] IN BLOOD BY AUTOMATED COUNT: 13.1 % (ref 11.6–15.1)
GFR SERPL CREATININE-BSD FRML MDRD: 127 ML/MIN/1.73SQ M
GLUCOSE P FAST SERPL-MCNC: 109 MG/DL (ref 65–99)
HCT VFR BLD AUTO: 43 % (ref 36.5–49.3)
HGB BLD-MCNC: 14.2 G/DL (ref 12–17)
IMM GRANULOCYTES # BLD AUTO: 0.01 THOUSAND/UL (ref 0–0.2)
IMM GRANULOCYTES NFR BLD AUTO: 0 % (ref 0–2)
LYMPHOCYTES # BLD AUTO: 1.92 THOUSANDS/ÂΜL (ref 0.6–4.47)
LYMPHOCYTES NFR BLD AUTO: 39 % (ref 14–44)
MCH RBC QN AUTO: 29.4 PG (ref 26.8–34.3)
MCHC RBC AUTO-ENTMCNC: 33 G/DL (ref 31.4–37.4)
MCV RBC AUTO: 89 FL (ref 82–98)
MICROALBUMIN UR-MCNC: 13.2 MG/L
MICROALBUMIN/CREAT 24H UR: 8 MG/G CREATININE (ref 0–30)
MONOCYTES # BLD AUTO: 0.36 THOUSAND/ÂΜL (ref 0.17–1.22)
MONOCYTES NFR BLD AUTO: 7 % (ref 4–12)
NEUTROPHILS # BLD AUTO: 2.51 THOUSANDS/ÂΜL (ref 1.85–7.62)
NEUTS SEG NFR BLD AUTO: 51 % (ref 43–75)
NRBC BLD AUTO-RTO: 0 /100 WBCS
PLATELET # BLD AUTO: 227 THOUSANDS/UL (ref 149–390)
PMV BLD AUTO: 10.5 FL (ref 8.9–12.7)
POTASSIUM SERPL-SCNC: 4.3 MMOL/L (ref 3.5–5.3)
PROT SERPL-MCNC: 7.3 G/DL (ref 6.4–8.4)
RBC # BLD AUTO: 4.83 MILLION/UL (ref 3.88–5.62)
SODIUM SERPL-SCNC: 139 MMOL/L (ref 135–147)
TSH SERPL DL<=0.05 MIU/L-ACNC: 1.42 UIU/ML (ref 0.45–4.5)
WBC # BLD AUTO: 4.93 THOUSAND/UL (ref 4.31–10.16)

## 2023-09-02 PROCEDURE — 82306 VITAMIN D 25 HYDROXY: CPT

## 2023-09-02 PROCEDURE — 84443 ASSAY THYROID STIM HORMONE: CPT

## 2023-09-02 PROCEDURE — 36415 COLL VENOUS BLD VENIPUNCTURE: CPT

## 2023-09-02 PROCEDURE — 85025 COMPLETE CBC W/AUTO DIFF WBC: CPT

## 2023-09-02 PROCEDURE — 80053 COMPREHEN METABOLIC PANEL: CPT

## 2023-09-02 PROCEDURE — 83036 HEMOGLOBIN GLYCOSYLATED A1C: CPT

## 2023-09-02 PROCEDURE — 82570 ASSAY OF URINE CREATININE: CPT

## 2023-09-02 PROCEDURE — 82043 UR ALBUMIN QUANTITATIVE: CPT

## 2023-09-05 LAB
EST. AVERAGE GLUCOSE BLD GHB EST-MCNC: 131 MG/DL
HBA1C MFR BLD: 6.2 %

## 2023-09-08 ENCOUNTER — OFFICE VISIT (OUTPATIENT)
Dept: FAMILY MEDICINE CLINIC | Facility: CLINIC | Age: 23
End: 2023-09-08
Payer: COMMERCIAL

## 2023-09-08 VITALS
BODY MASS INDEX: 27.97 KG/M2 | WEIGHT: 174 LBS | DIASTOLIC BLOOD PRESSURE: 84 MMHG | RESPIRATION RATE: 16 BRPM | SYSTOLIC BLOOD PRESSURE: 128 MMHG | TEMPERATURE: 98.2 F | HEART RATE: 68 BPM | HEIGHT: 66 IN

## 2023-09-08 DIAGNOSIS — L70.0 ACNE VULGARIS: ICD-10-CM

## 2023-09-08 DIAGNOSIS — J30.2 SEASONAL ALLERGIES: ICD-10-CM

## 2023-09-08 DIAGNOSIS — F84.0 AUTISM: ICD-10-CM

## 2023-09-08 DIAGNOSIS — E11.9 TYPE 2 DIABETES MELLITUS WITHOUT COMPLICATION, WITHOUT LONG-TERM CURRENT USE OF INSULIN (HCC): Primary | ICD-10-CM

## 2023-09-08 PROCEDURE — 99214 OFFICE O/P EST MOD 30 MIN: CPT | Performed by: FAMILY MEDICINE

## 2023-09-08 RX ORDER — CLINDAMYCIN PHOSPHATE 11.9 MG/ML
SOLUTION TOPICAL 2 TIMES DAILY
Qty: 60 ML | Refills: 5 | Status: SHIPPED | OUTPATIENT
Start: 2023-09-08

## 2023-09-08 RX ORDER — CETIRIZINE HYDROCHLORIDE 10 MG/1
10 TABLET ORAL DAILY
Qty: 90 TABLET | Refills: 1 | Status: SHIPPED | OUTPATIENT
Start: 2023-09-08

## 2023-09-08 RX ORDER — GLIMEPIRIDE 1 MG/1
1 TABLET ORAL
Qty: 90 TABLET | Refills: 3 | Status: SHIPPED | OUTPATIENT
Start: 2023-09-08 | End: 2024-09-02

## 2023-09-08 RX ORDER — BLOOD-GLUCOSE METER
KIT MISCELLANEOUS
Qty: 1 KIT | Refills: 0 | Status: CANCELLED | OUTPATIENT
Start: 2023-09-08

## 2023-09-08 RX ORDER — LANCETS 33 GAUGE
EACH MISCELLANEOUS
Qty: 100 EACH | Refills: 3 | Status: SHIPPED | OUTPATIENT
Start: 2023-09-08

## 2023-09-08 NOTE — PROGRESS NOTES
Assessment/Plan: Diabetes mellitus type 2 was advised glucose well controlled and hemoglobin A1c of 6.2    Elevated vitamin D at 119.3 the patient and the patient's mother were advised to stop vitamin D supplementation we will recheck a vitamin D level in 4 months    Acne vulgaris Cleocin is effective therapy    Seasonal allergies treated with cetirizine with good effect     Autistic spectrum nonverbal at baseline    Problem List Items Addressed This Visit        Endocrine    Type 2 diabetes mellitus without complication, without long-term current use of insulin (HCC) - Primary    Relevant Medications    glimepiride (AMARYL) 1 mg tablet    metFORMIN (GLUCOPHAGE) 1000 MG tablet    OneTouch Delica Lancets 53I MISC    Other Relevant Orders    Albumin / creatinine urine ratio    Comprehensive metabolic panel    Hemoglobin A1C    TSH, 3rd generation with Free T4 reflex    Lipid Panel with Direct LDL reflex       Musculoskeletal and Integument    Acne vulgaris    Relevant Medications    benzoyl peroxide 5 % gel    clindamycin (CLEOCIN T) 1 % external solution       Other    Seasonal allergies    Relevant Medications    cetirizine (EQ Allergy Relief, Cetirizine,) 10 mg tablet    Autism        Diagnoses and all orders for this visit:    Type 2 diabetes mellitus without complication, without long-term current use of insulin (HCC)  -     glimepiride (AMARYL) 1 mg tablet; Take 1 tablet (1 mg total) by mouth daily with breakfast  -     metFORMIN (GLUCOPHAGE) 1000 MG tablet; Take 1 tablet (1,000 mg total) by mouth 2 (two) times a day  -     OneTouch Delica Lancets 04B MISC; Check blood sugars once daily. Please substitute with appropriate alternative as covered by patient's insurance. Dx: E11.65  -     Albumin / creatinine urine ratio; Future  -     Comprehensive metabolic panel; Future  -     Hemoglobin A1C; Future  -     TSH, 3rd generation with Free T4 reflex; Future  -     Lipid Panel with Direct LDL reflex;  Future    Acne vulgaris  -     benzoyl peroxide 5 % gel; Apply topically 2 (two) times a day  -     clindamycin (CLEOCIN T) 1 % external solution; Apply topically 2 (two) times a day    Seasonal allergies  -     cetirizine (EQ Allergy Relief, Cetirizine,) 10 mg tablet; Take 1 tablet (10 mg total) by mouth daily    Autism        No problem-specific Assessment & Plan notes found for this encounter. PHQ-2/9 Depression Screening            Body mass index is 28.08 kg/m². BMI Counseling: Body mass index is 28.08 kg/m². The BMI     Subjective:      Patient ID: Bucky Miner is a 24 y.o. male. Patient presents accompanied by mother for 4-month checkup on diabetes      The following portions of the patient's history were reviewed and updated as appropriate:   He has a past medical history of Allergic, Autism, Diabetes mellitus ([address]), and Seasonal allergies. ,  does not have any pertinent problems on file. ,   has a past surgical history that includes Cataract extraction, bilateral.,  family history includes Alcohol abuse in his maternal grandmother; Mental illness in his maternal aunt; No Known Problems in his father and mother. ,   reports that he has never smoked. He has been exposed to tobacco smoke. He has never used smokeless tobacco. He reports that he does not drink alcohol and does not use drugs. ,  has No Known Allergies. .  Current Outpatient Medications   Medication Sig Dispense Refill   • benzoyl peroxide 5 % gel Apply topically 2 (two) times a day 45 g 5   • cetirizine (EQ Allergy Relief, Cetirizine,) 10 mg tablet Take 1 tablet (10 mg total) by mouth daily 90 tablet 1   • clindamycin (CLEOCIN T) 1 % external solution Apply topically 2 (two) times a day 60 mL 5   • glimepiride (AMARYL) 1 mg tablet Take 1 tablet (1 mg total) by mouth daily with breakfast 90 tablet 3   • metFORMIN (GLUCOPHAGE) 1000 MG tablet Take 1 tablet (1,000 mg total) by mouth 2 (two) times a day 180 tablet 1   • OneTouch Delica Lancets 88M MISC Check blood sugars once daily. Please substitute with appropriate alternative as covered by patient's insurance. Dx: E11.65 100 each 3   • Apple Cider Vinegar 600 MG CAPS Take 1,200 mg by mouth 3 (three) times a day with meals     • ascorbic acid (VITAMIN C) 250 mg tablet Take 250 mg by mouth daily     • benzonatate (TESSALON) 200 MG capsule Take 1 capsule (200 mg total) by mouth 3 (three) times a day as needed for cough (Patient not taking: Reported on 5/8/2023) 20 capsule 0   • Blood Glucose Monitoring Suppl (OneTouch Verio Reflect) w/Device KIT Check blood sugars once daily. Please substitute with appropriate alternative as covered by patient's insurance. Dx: E11.65 1 kit 0   • Cyanocobalamin (VITAMIN B-12) 6000 MCG SUBL Place 6,000 mcg under the tongue daily     • fluticasone (FLONASE) 50 mcg/act nasal spray 2 sprays into each nostril daily 48 g 3   • gentamicin (GARAMYCIN) 0.3 % ophthalmic solution Administer 1 drop to the right eye every 4 (four) hours 5 mL 0   • glucose blood (OneTouch Verio) test strip Check blood sugars once daily. Please substitute with appropriate alternative as covered by patient's insurance. Dx: E11.65 100 each 3   • NON FORMULARY Medical Marijuana     • predniSONE 10 mg tablet Take 3 tabs BID X 2 days, 2 tabs BID X 2 days, 1 tab BID X 2 days, 1 tab daily X 2 days (Patient not taking: Reported on 5/8/2023) 26 tablet 0   • zinc sulfate (ZINCATE) 220 mg capsule Take 220 mg by mouth daily       No current facility-administered medications for this visit. Review of Systems   Constitutional: Negative for chills and fever. HENT: Negative for ear pain and sore throat. Eyes: Negative for pain and visual disturbance. Respiratory: Negative for cough and shortness of breath. Cardiovascular: Negative for chest pain and palpitations. Gastrointestinal: Negative for abdominal pain and vomiting. Genitourinary: Negative for dysuria and hematuria.    Musculoskeletal: Negative for arthralgias and back pain. Skin: Negative for color change and rash. Neurological: Negative for seizures and syncope. All other systems reviewed and are negative. Objective:    /84   Pulse 68   Temp 98.2 °F (36.8 °C)   Resp 16   Ht 5' 6" (1.676 m)   Wt 78.9 kg (174 lb)   BMI 28.08 kg/m²   Body mass index is 28.08 kg/m². Physical Exam  Constitutional:       Appearance: Normal appearance. He is well-developed. HENT:      Head: Normocephalic and atraumatic. Right Ear: Tympanic membrane, ear canal and external ear normal.      Left Ear: Tympanic membrane, ear canal and external ear normal.      Nose: Nose normal.      Mouth/Throat:      Mouth: Mucous membranes are moist.      Pharynx: Oropharynx is clear. Eyes:      Extraocular Movements: Extraocular movements intact. Conjunctiva/sclera: Conjunctivae normal.      Pupils: Pupils are equal, round, and reactive to light. Cardiovascular:      Rate and Rhythm: Normal rate and regular rhythm. Pulses: Normal pulses. Heart sounds: Normal heart sounds. Pulmonary:      Effort: Pulmonary effort is normal.      Breath sounds: Normal breath sounds. Abdominal:      General: Abdomen is flat. Bowel sounds are normal.      Palpations: Abdomen is soft. Tenderness: There is no abdominal tenderness. Musculoskeletal:         General: Normal range of motion. Cervical back: Normal range of motion and neck supple. Skin:     General: Skin is warm and dry. Capillary Refill: Capillary refill takes less than 2 seconds. Neurological:      General: No focal deficit present. Mental Status: He is alert and oriented to person, place, and time. Psychiatric:         Mood and Affect: Mood normal.         Behavior: Behavior normal.         Thought Content:  Thought content normal.         Judgment: Judgment normal.

## 2023-09-11 ENCOUNTER — TELEPHONE (OUTPATIENT)
Dept: ADMINISTRATIVE | Facility: OTHER | Age: 23
End: 2023-09-11

## 2023-09-11 ENCOUNTER — TELEPHONE (OUTPATIENT)
Dept: FAMILY MEDICINE CLINIC | Facility: CLINIC | Age: 23
End: 2023-09-11

## 2023-09-11 DIAGNOSIS — J30.2 SEASONAL ALLERGIES: ICD-10-CM

## 2023-09-11 RX ORDER — FLUTICASONE PROPIONATE 50 MCG
2 SPRAY, SUSPENSION (ML) NASAL DAILY
Qty: 48 G | Refills: 3 | Status: SHIPPED | OUTPATIENT
Start: 2023-09-11

## 2023-09-11 NOTE — TELEPHONE ENCOUNTER
Upon review of the In Basket request and the patient's chart, initial outreach has been made via fax to facility. Please see Contacts section for details.      Thank you  Sree Serrano

## 2023-09-11 NOTE — LETTER
Diabetic Eye Exam Form    Date Requested: 23  Patient: Petar Plasencia  Patient : 2000   Referring Provider: Minal Garcia DO      DIABETIC Eye Exam Date _______________________________      Type of Exam MUST be documented for Diabetic Eye Exams. Please CHECK ONE. Retinal Exam       Dilated Retinal Exam       OCT       Optomap-Iris Exam      Fundus Photography       Left Eye - Please check Retinopathy or No Retinopathy        Exam did show retinopathy    Exam did not show retinopathy       Right Eye - Please check Retinopathy or No Retinopathy       Exam did show retinopathy    Exam did not show retinopathy       Comments DOS: 23    Practice Providing Exam ______________________________________________    Exam Performed By (print name) _______________________________________      Provider Signature ___________________________________________________      These reports are needed for  compliance. Please fax this completed form and a copy of the Diabetic Eye Exam report to our office located at [address] [place] [address] as soon as possible via Fax [phone] osimn Cuellar: Phone [phone]  We thank you for your assistance in treating our mutual patient.

## 2023-09-11 NOTE — TELEPHONE ENCOUNTER
----- Message from Jeana Gallegos sent at 9/8/2023 12:59 PM EDT -----  Regarding: Care Gaps Request  09/08/23 12:59 PM    Hello, our patient Bucky Miner has had Diabetic Eye Exam completed/performed. Please assist in updating the patient chart by making an External outreach to [facility] facility located in [facility]. The date of service is 07/05/2023.     Thank you,  Jeana Gallegos  [facility] [address] [facility]

## 2023-09-15 NOTE — TELEPHONE ENCOUNTER
Upon review of the In Basket request we were able to locate, review, and update the patient chart as requested for Diabetic Eye Exam.    Any additional questions or concerns should be emailed to the Practice Liaisons via the appropriate education email address, please do not reply via In Basket.     Thank you  Josh Turpin

## 2023-11-06 ENCOUNTER — OFFICE VISIT (OUTPATIENT)
Dept: FAMILY MEDICINE CLINIC | Facility: CLINIC | Age: 23
End: 2023-11-06
Payer: COMMERCIAL

## 2023-11-06 VITALS
BODY MASS INDEX: 28.77 KG/M2 | SYSTOLIC BLOOD PRESSURE: 124 MMHG | HEART RATE: 84 BPM | HEIGHT: 66 IN | RESPIRATION RATE: 20 BRPM | DIASTOLIC BLOOD PRESSURE: 68 MMHG | WEIGHT: 179 LBS | TEMPERATURE: 97.9 F

## 2023-11-06 DIAGNOSIS — E11.9 TYPE 2 DIABETES MELLITUS WITHOUT COMPLICATION, WITHOUT LONG-TERM CURRENT USE OF INSULIN (HCC): ICD-10-CM

## 2023-11-06 DIAGNOSIS — H66.93 OTITIS OF BOTH EARS: ICD-10-CM

## 2023-11-06 DIAGNOSIS — J20.9 ACUTE BRONCHITIS, UNSPECIFIED ORGANISM: Primary | ICD-10-CM

## 2023-11-06 DIAGNOSIS — F84.0 AUTISM: ICD-10-CM

## 2023-11-06 PROCEDURE — 99214 OFFICE O/P EST MOD 30 MIN: CPT | Performed by: FAMILY MEDICINE

## 2023-11-06 RX ORDER — DEXTROMETHORPHAN HYDROBROMIDE AND PROMETHAZINE HYDROCHLORIDE 15; 6.25 MG/5ML; MG/5ML
5 SYRUP ORAL 4 TIMES DAILY PRN
Qty: 180 ML | Refills: 1 | Status: SHIPPED | OUTPATIENT
Start: 2023-11-06

## 2023-11-06 RX ORDER — AZITHROMYCIN 250 MG/1
TABLET, FILM COATED ORAL
Qty: 6 TABLET | Refills: 0 | Status: SHIPPED | OUTPATIENT
Start: 2023-11-06 | End: 2023-11-11

## 2023-11-06 NOTE — PROGRESS NOTES
Assessment/Plan: Bilateral otitis with bronchitis we will provide a Zithromax Z-GITA along with Promethazine DM    Diabetes mellitus type 2 with a September 2 hemoglobin A1c of 6.2    Autistic spectrum nonverbal at baseline    Problem List Items Addressed This Visit     Type 2 diabetes mellitus without complication, without long-term current use of insulin (HCC)    Autism   Other Visit Diagnoses     Acute bronchitis, unspecified organism    -  Primary    Relevant Medications    azithromycin (Zithromax) 250 mg tablet    promethazine-dextromethorphan (PHENERGAN-DM) 6.25-15 mg/5 mL oral syrup    Otitis of both ears        Relevant Medications    azithromycin (Zithromax) 250 mg tablet    promethazine-dextromethorphan (PHENERGAN-DM) 6.25-15 mg/5 mL oral syrup             Diagnoses and all orders for this visit:    Acute bronchitis, unspecified organism  -     azithromycin (Zithromax) 250 mg tablet; Take 2 tablets (500 mg total) by mouth daily for 1 day, THEN 1 tablet (250 mg total) daily for 4 days. -     promethazine-dextromethorphan (PHENERGAN-DM) 6.25-15 mg/5 mL oral syrup; Take 5 mL by mouth 4 (four) times a day as needed for cough    Otitis of both ears  -     azithromycin (Zithromax) 250 mg tablet; Take 2 tablets (500 mg total) by mouth daily for 1 day, THEN 1 tablet (250 mg total) daily for 4 days. -     promethazine-dextromethorphan (PHENERGAN-DM) 6.25-15 mg/5 mL oral syrup; Take 5 mL by mouth 4 (four) times a day as needed for cough    Type 2 diabetes mellitus without complication, without long-term current use of insulin (HCC)    Autism        No problem-specific Assessment & Plan notes found for this encounter. PHQ-2/9 Depression Screening    Little interest or pleasure in doing things: 0 - not at all  Feeling down, depressed, or hopeless: 0 - not at all  PHQ-2 Score: 0  PHQ-2 Interpretation: Negative depression screen          Body mass index is 28.89 kg/m². BMI Counseling:  Body mass index is 28.89 kg/m². The BMI     Subjective:      Patient ID: Casie Cassidy is a 24 y.o. male. Patient presents with cold-like symptoms of 1 week duration with nasal congestion sore throat cough productive of yellowish phlegm bilateral earaches and headache    Sore Throat   Associated symptoms include coughing, ear pain and headaches. Pertinent negatives include no abdominal pain, shortness of breath or vomiting. Cough  Associated symptoms include ear pain, headaches and a sore throat. Pertinent negatives include no chest pain, chills, fever, rash or shortness of breath. Earache   Associated symptoms include coughing, headaches and a sore throat. Pertinent negatives include no abdominal pain, rash or vomiting. Headache      The following portions of the patient's history were reviewed and updated as appropriate:   He has a past medical history of Allergic, Autism, Diabetes mellitus ([address]), Non-verbal learning disorder, and Seasonal allergies. ,  does not have any pertinent problems on file. ,   has a past surgical history that includes Cataract extraction, bilateral.,  family history includes Alcohol abuse in his maternal grandmother; Mental illness in his maternal aunt; No Known Problems in his father and mother. ,   reports that he has never smoked. He has been exposed to tobacco smoke. He has never used smokeless tobacco. He reports that he does not drink alcohol and does not use drugs. ,  has No Known Allergies. .  Current Outpatient Medications   Medication Sig Dispense Refill   • azithromycin (Zithromax) 250 mg tablet Take 2 tablets (500 mg total) by mouth daily for 1 day, THEN 1 tablet (250 mg total) daily for 4 days.  6 tablet 0   • promethazine-dextromethorphan (PHENERGAN-DM) 6.25-15 mg/5 mL oral syrup Take 5 mL by mouth 4 (four) times a day as needed for cough 180 mL 1   • Apple Cider Vinegar 600 MG CAPS Take 1,200 mg by mouth 3 (three) times a day with meals     • ascorbic acid (VITAMIN C) 250 mg tablet Take 250 mg by mouth daily     • benzonatate (TESSALON) 200 MG capsule Take 1 capsule (200 mg total) by mouth 3 (three) times a day as needed for cough (Patient not taking: Reported on 5/8/2023) 20 capsule 0   • benzoyl peroxide 5 % gel Apply topically 2 (two) times a day 45 g 5   • Blood Glucose Monitoring Suppl (OneTouch Verio Reflect) w/Device KIT Check blood sugars once daily. Please substitute with appropriate alternative as covered by patient's insurance. Dx: E11.65 1 kit 0   • cetirizine (EQ Allergy Relief, Cetirizine,) 10 mg tablet Take 1 tablet (10 mg total) by mouth daily 90 tablet 1   • clindamycin (CLEOCIN T) 1 % external solution Apply topically 2 (two) times a day 60 mL 5   • Cyanocobalamin (VITAMIN B-12) 6000 MCG SUBL Place 6,000 mcg under the tongue daily     • fluticasone (FLONASE) 50 mcg/act nasal spray 2 sprays into each nostril daily 48 g 3   • gentamicin (GARAMYCIN) 0.3 % ophthalmic solution Administer 1 drop to the right eye every 4 (four) hours 5 mL 0   • glimepiride (AMARYL) 1 mg tablet Take 1 tablet (1 mg total) by mouth daily with breakfast 90 tablet 3   • glucose blood (OneTouch Verio) test strip Check blood sugars once daily. Please substitute with appropriate alternative as covered by patient's insurance. Dx: E11.65 100 each 3   • metFORMIN (GLUCOPHAGE) 1000 MG tablet Take 1 tablet (1,000 mg total) by mouth 2 (two) times a day 180 tablet 1   • NON FORMULARY Medical Marijuana     • OneTouch Delica Lancets 95C MISC Check blood sugars once daily. Please substitute with appropriate alternative as covered by patient's insurance. Dx: E11.65 100 each 3   • predniSONE 10 mg tablet Take 3 tabs BID X 2 days, 2 tabs BID X 2 days, 1 tab BID X 2 days, 1 tab daily X 2 days (Patient not taking: Reported on 5/8/2023) 26 tablet 0   • zinc sulfate (ZINCATE) 220 mg capsule Take 220 mg by mouth daily       No current facility-administered medications for this visit.        Review of Systems   Constitutional:  Negative for chills and fever. HENT:  Positive for ear pain and sore throat. Eyes:  Negative for pain and visual disturbance. Respiratory:  Positive for cough. Negative for shortness of breath. Cardiovascular:  Negative for chest pain and palpitations. Gastrointestinal:  Negative for abdominal pain and vomiting. Genitourinary:  Negative for dysuria and hematuria. Musculoskeletal:  Negative for arthralgias and back pain. Skin:  Negative for color change and rash. Neurological:  Positive for headaches. Negative for seizures and syncope. All other systems reviewed and are negative. Objective:    /68   Pulse 84   Temp 97.9 °F (36.6 °C)   Resp 20   Ht 5' 6" (1.676 m)   Wt 81.2 kg (179 lb)   BMI 28.89 kg/m²   Body mass index is 28.89 kg/m². Physical Exam  Constitutional:       Appearance: Normal appearance. He is well-developed. HENT:      Head: Normocephalic and atraumatic. Right Ear: Ear canal and external ear normal. Tympanic membrane is erythematous and bulging. Left Ear: Ear canal and external ear normal. Tympanic membrane is erythematous and bulging. Nose: Nose normal.      Mouth/Throat:      Mouth: Mucous membranes are moist.      Pharynx: Oropharynx is clear. Eyes:      Extraocular Movements: Extraocular movements intact. Conjunctiva/sclera: Conjunctivae normal.      Pupils: Pupils are equal, round, and reactive to light. Cardiovascular:      Rate and Rhythm: Normal rate and regular rhythm. Pulses: Normal pulses. Heart sounds: Normal heart sounds. Pulmonary:      Effort: Pulmonary effort is normal.      Breath sounds: Rhonchi present. Abdominal:      General: Abdomen is flat. Bowel sounds are normal.      Palpations: Abdomen is soft. Tenderness: There is no abdominal tenderness. Musculoskeletal:         General: Normal range of motion. Cervical back: Normal range of motion and neck supple.    Skin:     General: Skin is warm and dry.      Capillary Refill: Capillary refill takes less than 2 seconds. Neurological:      General: No focal deficit present. Mental Status: He is alert and oriented to person, place, and time. Psychiatric:         Mood and Affect: Mood normal.         Behavior: Behavior normal.         Thought Content:  Thought content normal.         Judgment: Judgment normal.

## 2023-12-01 ENCOUNTER — TELEPHONE (OUTPATIENT)
Dept: FAMILY MEDICINE CLINIC | Facility: CLINIC | Age: 23
End: 2023-12-01

## 2023-12-01 DIAGNOSIS — Z01.810 PREOPERATIVE CARDIOVASCULAR EXAMINATION: Primary | ICD-10-CM

## 2023-12-01 NOTE — TELEPHONE ENCOUNTER
Called mom with same - she said that [place] from the eye [facility] is checking to see if he for sure needs the EKG

## 2023-12-01 NOTE — TELEPHONE ENCOUNTER
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

## 2023-12-02 ENCOUNTER — OFFICE VISIT (OUTPATIENT)
Dept: LAB | Facility: HOSPITAL | Age: 23
End: 2023-12-02
Attending: FAMILY MEDICINE
Payer: COMMERCIAL

## 2023-12-02 DIAGNOSIS — Z01.810 PREOPERATIVE CARDIOVASCULAR EXAMINATION: ICD-10-CM

## 2023-12-02 PROCEDURE — 93005 ELECTROCARDIOGRAM TRACING: CPT

## 2023-12-04 LAB
ATRIAL RATE: 128 BPM
P AXIS: 71 DEGREES
PR INTERVAL: 132 MS
QRS AXIS: 76 DEGREES
QRSD INTERVAL: 96 MS
QT INTERVAL: 298 MS
QTC INTERVAL: 435 MS
T WAVE AXIS: 32 DEGREES
VENTRICULAR RATE: 128 BPM

## 2023-12-05 ENCOUNTER — OFFICE VISIT (OUTPATIENT)
Dept: FAMILY MEDICINE CLINIC | Facility: CLINIC | Age: 23
End: 2023-12-05
Payer: COMMERCIAL

## 2023-12-05 VITALS
HEIGHT: 66 IN | DIASTOLIC BLOOD PRESSURE: 68 MMHG | BODY MASS INDEX: 27.97 KG/M2 | WEIGHT: 174 LBS | HEART RATE: 84 BPM | SYSTOLIC BLOOD PRESSURE: 124 MMHG | RESPIRATION RATE: 20 BRPM | TEMPERATURE: 98.2 F

## 2023-12-05 DIAGNOSIS — H33.23 BILATERAL RETINAL DETACHMENT: ICD-10-CM

## 2023-12-05 DIAGNOSIS — Z01.818 PREOPERATIVE CLEARANCE: Primary | ICD-10-CM

## 2023-12-05 DIAGNOSIS — E11.9 TYPE 2 DIABETES MELLITUS WITHOUT COMPLICATION, WITHOUT LONG-TERM CURRENT USE OF INSULIN (HCC): ICD-10-CM

## 2023-12-05 DIAGNOSIS — F84.0 AUTISM: ICD-10-CM

## 2023-12-05 PROCEDURE — 99214 OFFICE O/P EST MOD 30 MIN: CPT | Performed by: FAMILY MEDICINE

## 2023-12-05 NOTE — PROGRESS NOTES
Assessment/Plan: The patient is cleared for ophthalmological surgery    Diabetes mellitus type 2 with hemoglobin A1c on September 2, 2023 of 6.2    Autistic spectrum nonverbal at baseline    EKG with lateral infarct and possible inferior infarct age undetermined. When compared to the EKG of April 21, 2021 T wave amplitude has decreased in the anterior lateral leads. EKG is essentially unchanged from 2021. The patient has no complaints referable to the cardiovascular system    Problem List Items Addressed This Visit       Type 2 diabetes mellitus without complication, without long-term current use of insulin (HCC)    Autism    Bilateral retinal detachment     Other Visit Diagnoses       Preoperative clearance    -  Primary               Diagnoses and all orders for this visit:    Preoperative clearance    Bilateral retinal detachment    Type 2 diabetes mellitus without complication, without long-term current use of insulin (HCC)    Autism        No problem-specific Assessment & Plan notes found for this encounter. PHQ-2/9 Depression Screening              Body mass index is 28.08 kg/m². BMI Counseling: Body mass index is 28.08 kg/m². The BMI     Subjective:      Patient ID: Neil Shelley is a 24 y.o. male. Patient presents accompanied by mother for preoperative clearance patient has bilateral detached retina        The following portions of the patient's history were reviewed and updated as appropriate:   He has a past medical history of Allergic, Autism, Diabetes mellitus ([address]), Non-verbal learning disorder, and Seasonal allergies. ,  does not have any pertinent problems on file. ,   has a past surgical history that includes Cataract extraction, bilateral (2003). ,  family history includes Alcohol abuse in his maternal grandmother; Mental illness in his maternal aunt; No Known Problems in his father and mother. ,   reports that he has never smoked. He has been exposed to tobacco smoke.  He has never used smokeless tobacco. He reports that he does not drink alcohol and does not use drugs. ,  has No Known Allergies. .  Current Outpatient Medications   Medication Sig Dispense Refill    Apple Cider Vinegar 600 MG CAPS Take 1,200 mg by mouth 3 (three) times a day with meals      ascorbic acid (VITAMIN C) 250 mg tablet Take 250 mg by mouth daily      benzonatate (TESSALON) 200 MG capsule Take 1 capsule (200 mg total) by mouth 3 (three) times a day as needed for cough (Patient not taking: Reported on 5/8/2023) 20 capsule 0    benzoyl peroxide 5 % gel Apply topically 2 (two) times a day 45 g 5    Blood Glucose Monitoring Suppl (OneTouch Verio Reflect) w/Device KIT Check blood sugars once daily. Please substitute with appropriate alternative as covered by patient's insurance. Dx: E11.65 1 kit 0    cetirizine (EQ Allergy Relief, Cetirizine,) 10 mg tablet Take 1 tablet (10 mg total) by mouth daily 90 tablet 1    clindamycin (CLEOCIN T) 1 % external solution Apply topically 2 (two) times a day 60 mL 5    Cyanocobalamin (VITAMIN B-12) 6000 MCG SUBL Place 6,000 mcg under the tongue daily      fluticasone (FLONASE) 50 mcg/act nasal spray 2 sprays into each nostril daily 48 g 3    gentamicin (GARAMYCIN) 0.3 % ophthalmic solution Administer 1 drop to the right eye every 4 (four) hours 5 mL 0    glimepiride (AMARYL) 1 mg tablet Take 1 tablet (1 mg total) by mouth daily with breakfast 90 tablet 3    glucose blood (OneTouch Verio) test strip Check blood sugars once daily. Please substitute with appropriate alternative as covered by patient's insurance. Dx: E11.65 100 each 3    metFORMIN (GLUCOPHAGE) 1000 MG tablet Take 1 tablet (1,000 mg total) by mouth 2 (two) times a day 180 tablet 1    NON FORMULARY Medical Marijuana      OneTouch Delica Lancets 22G MISC Check blood sugars once daily. Please substitute with appropriate alternative as covered by patient's insurance.  Dx: E11.65 100 each 3    predniSONE 10 mg tablet Take 3 tabs BID X 2 days, 2 tabs BID X 2 days, 1 tab BID X 2 days, 1 tab daily X 2 days (Patient not taking: Reported on 5/8/2023) 26 tablet 0    promethazine-dextromethorphan (PHENERGAN-DM) 6.25-15 mg/5 mL oral syrup Take 5 mL by mouth 4 (four) times a day as needed for cough 180 mL 1    zinc sulfate (ZINCATE) 220 mg capsule Take 220 mg by mouth daily       No current facility-administered medications for this visit. Review of Systems   Constitutional:  Negative for chills and fever. HENT:  Negative for ear pain and sore throat. Eyes:  Positive for visual disturbance. Negative for pain. Respiratory:  Negative for cough and shortness of breath. Cardiovascular:  Negative for chest pain and palpitations. Gastrointestinal:  Negative for abdominal pain and vomiting. Genitourinary:  Negative for dysuria and hematuria. Musculoskeletal:  Negative for arthralgias and back pain. Skin:  Negative for color change and rash. Neurological:  Negative for seizures and syncope. All other systems reviewed and are negative. Objective:    /68   Pulse 84   Temp 98.2 °F (36.8 °C)   Resp 20   Ht 5' 6" (1.676 m)   Wt 78.9 kg (174 lb)   BMI 28.08 kg/m²   Body mass index is 28.08 kg/m². Physical Exam  Constitutional:       Appearance: Normal appearance. He is well-developed. HENT:      Head: Normocephalic and atraumatic. Right Ear: Tympanic membrane, ear canal and external ear normal.      Left Ear: Tympanic membrane, ear canal and external ear normal.      Nose: Nose normal.      Mouth/Throat:      Mouth: Mucous membranes are moist.      Pharynx: Oropharynx is clear. Eyes:      Extraocular Movements: Extraocular movements intact. Conjunctiva/sclera: Conjunctivae normal.      Pupils: Pupils are equal, round, and reactive to light. Cardiovascular:      Rate and Rhythm: Normal rate and regular rhythm. Pulses: Normal pulses. Heart sounds: Normal heart sounds.    Pulmonary:      Effort: Pulmonary effort is normal.      Breath sounds: Normal breath sounds. Abdominal:      General: Abdomen is flat. Bowel sounds are normal.      Palpations: Abdomen is soft. Tenderness: There is no abdominal tenderness. Musculoskeletal:         General: Normal range of motion. Cervical back: Normal range of motion and neck supple. Skin:     General: Skin is warm and dry. Capillary Refill: Capillary refill takes less than 2 seconds. Neurological:      General: No focal deficit present. Mental Status: He is alert and oriented to person, place, and time. Psychiatric:         Mood and Affect: Mood normal.         Behavior: Behavior normal.         Thought Content:  Thought content normal.         Judgment: Judgment normal.

## 2023-12-09 ENCOUNTER — HOSPITAL ENCOUNTER (EMERGENCY)
Facility: HOSPITAL | Age: 23
Discharge: HOME | End: 2023-12-09
Attending: EMERGENCY MEDICINE
Payer: COMMERCIAL

## 2023-12-09 ENCOUNTER — APPOINTMENT (EMERGENCY)
Dept: RADIOLOGY | Facility: HOSPITAL | Age: 23
End: 2023-12-09
Payer: COMMERCIAL

## 2023-12-09 VITALS
DIASTOLIC BLOOD PRESSURE: 100 MMHG | HEART RATE: 135 BPM | OXYGEN SATURATION: 100 % | RESPIRATION RATE: 20 BRPM | SYSTOLIC BLOOD PRESSURE: 139 MMHG | WEIGHT: 173.94 LBS | BODY MASS INDEX: 28.08 KG/M2 | TEMPERATURE: 98.2 F

## 2023-12-09 DIAGNOSIS — R05.9 COUGH: ICD-10-CM

## 2023-12-09 DIAGNOSIS — Z98.890 POST-OPERATIVE STATE: ICD-10-CM

## 2023-12-09 DIAGNOSIS — R11.2 NAUSEA AND VOMITING: Primary | ICD-10-CM

## 2023-12-09 LAB
FLUAV RNA RESP QL NAA+PROBE: NEGATIVE
FLUBV RNA RESP QL NAA+PROBE: NEGATIVE
GLUCOSE SERPL-MCNC: 205 MG/DL (ref 65–140)
RSV RNA RESP QL NAA+PROBE: NEGATIVE
SARS-COV-2 RNA RESP QL NAA+PROBE: NEGATIVE

## 2023-12-09 PROCEDURE — 71045 X-RAY EXAM CHEST 1 VIEW: CPT

## 2023-12-09 PROCEDURE — 0241U HB NFCT DS VIR RESP RNA 4 TRGT: CPT | Performed by: EMERGENCY MEDICINE

## 2023-12-09 PROCEDURE — 82948 REAGENT STRIP/BLOOD GLUCOSE: CPT

## 2023-12-09 PROCEDURE — 99283 EMERGENCY DEPT VISIT LOW MDM: CPT

## 2023-12-09 PROCEDURE — 99284 EMERGENCY DEPT VISIT MOD MDM: CPT | Performed by: EMERGENCY MEDICINE

## 2023-12-09 RX ORDER — BENZONATATE 100 MG/1
100 CAPSULE ORAL 3 TIMES DAILY PRN
Qty: 20 CAPSULE | Refills: 0 | Status: SHIPPED | OUTPATIENT
Start: 2023-12-09 | End: 2023-12-14 | Stop reason: SDUPTHER

## 2023-12-09 RX ORDER — POLYMYXIN B SULFATE AND TRIMETHOPRIM 1; 10000 MG/ML; [USP'U]/ML
1 SOLUTION OPHTHALMIC EVERY 4 HOURS
COMMUNITY

## 2023-12-09 RX ORDER — ONDANSETRON 4 MG/1
4 TABLET, ORALLY DISINTEGRATING ORAL ONCE
Status: COMPLETED | OUTPATIENT
Start: 2023-12-09 | End: 2023-12-09

## 2023-12-09 RX ORDER — PREDNISOLONE ACETATE 10 MG/ML
1 SUSPENSION/ DROPS OPHTHALMIC 4 TIMES DAILY
COMMUNITY

## 2023-12-09 RX ORDER — ONDANSETRON 4 MG/1
4 TABLET, ORALLY DISINTEGRATING ORAL EVERY 6 HOURS PRN
Qty: 30 TABLET | Refills: 0 | Status: SHIPPED | OUTPATIENT
Start: 2023-12-09

## 2023-12-09 RX ADMIN — ONDANSETRON 4 MG: 4 TABLET, ORALLY DISINTEGRATING ORAL at 12:12

## 2023-12-09 NOTE — ED PROVIDER NOTES
History  Chief Complaint   Patient presents with    Vomiting     Pt had recent eye surgery and now having tearing voming nasal drainage photo sensitivity. Came here for eval     39-year-old male presenting with both parents for evaluation of vomiting. Patient had surgery 2 days ago at [facility] for retinal detachment which involved placing an oil-based disc in his left eye. He did have sop appointment yesterday everything was deemed satisfactory per parents. There able to place drops in patient's eyes as scheduled. Patient has history of autism and is nonverbal.  Family concerned because he had an episode of vomiting at 230 this morning. He also has been coughing and they are concerned that that pressure may damage the recent surgical procedure. Other than the recent eyedrops and recent surgery, no other changes in medical condition. Tolerating p.o. intake. Afebrile. Prior to Admission Medications   Prescriptions Last Dose Informant Patient Reported? Taking? Apple Cider Vinegar 600 MG CAPS  Father Yes No   Sig: Take 1,200 mg by mouth 3 (three) times a day with meals   Blood Glucose Monitoring Suppl (OneTouch Verio Reflect) w/Device KIT   No No   Sig: Check blood sugars once daily. Please substitute with appropriate alternative as covered by patient's insurance. Dx: E11.65   Cyanocobalamin (VITAMIN B-12) 6000 MCG SUBL  Father Yes No   Sig: Place 6,000 mcg under the tongue daily   NON FORMULARY Not Taking  Yes No   Sig: Medical Marijuana   Patient not taking: Reported on 71/4/5406   OneTouch Delica Lancets 63I MISC   No No   Sig: Check blood sugars once daily. Please substitute with appropriate alternative as covered by patient's insurance.  Dx: E11.65   ascorbic acid (VITAMIN C) 250 mg tablet  Self Yes No   Sig: Take 250 mg by mouth daily   benzonatate (TESSALON) 200 MG capsule   No No   Sig: Take 1 capsule (200 mg total) by mouth 3 (three) times a day as needed for cough   Patient not taking: Reported on 2023   benzoyl peroxide 5 % gel   No No   Sig: Apply topically 2 (two) times a day   cetirizine (EQ Allergy Relief, Cetirizine,) 10 mg tablet   No No   Sig: Take 1 tablet (10 mg total) by mouth daily   clindamycin (CLEOCIN T) 1 % external solution   No No   Sig: Apply topically 2 (two) times a day   fluticasone (FLONASE) 50 mcg/act nasal spray   No No   Si sprays into each nostril daily   gentamicin (GARAMYCIN) 0.3 % ophthalmic solution   No No   Sig: Administer 1 drop to the right eye every 4 (four) hours   glimepiride (AMARYL) 1 mg tablet   No No   Sig: Take 1 tablet (1 mg total) by mouth daily with breakfast   glucose blood (OneTouch Verio) test strip   No No   Sig: Check blood sugars once daily. Please substitute with appropriate alternative as covered by patient's insurance.  Dx: E11.65   metFORMIN (GLUCOPHAGE) 1000 MG tablet   No No   Sig: Take 1 tablet (1,000 mg total) by mouth 2 (two) times a day   polymyxin b-trimethoprim (POLYTRIM) ophthalmic solution   Yes Yes   Sig: Administer 1 drop into the left eye every 4 (four) hours   predniSONE 10 mg tablet   No No   Sig: Take 3 tabs BID X 2 days, 2 tabs BID X 2 days, 1 tab BID X 2 days, 1 tab daily X 2 days   Patient not taking: Reported on 2023   prednisoLONE acetate (PRED FORTE) 1 % ophthalmic suspension   Yes Yes   Sig: Administer 1 drop to both eyes 4 (four) times a day   promethazine-dextromethorphan (PHENERGAN-DM) 6.25-15 mg/5 mL oral syrup   No No   Sig: Take 5 mL by mouth 4 (four) times a day as needed for cough   zinc sulfate (ZINCATE) 220 mg capsule  Self Yes No   Sig: Take 220 mg by mouth daily      Facility-Administered Medications: None       Past Medical History:   Diagnosis Date    Allergic     Autism     Diabetes mellitus ([address])     type 2 without complications    Non-verbal learning disorder     Autism    Seasonal allergies        Past Surgical History:   Procedure Laterality Date    CATARACT EXTRACTION, BILATERAL   Family History   Problem Relation Age of Onset    No Known Problems Mother     No Known Problems Father     Alcohol abuse Maternal Grandmother     Mental illness Maternal Aunt      I have reviewed and agree with the history as documented. E-Cigarette/Vaping    E-Cigarette Use Never User      E-Cigarette/Vaping Substances    Nicotine No     THC No     CBD No     Flavoring No     Other No     Unknown No      Social History     Tobacco Use    Smoking status: Never     Passive exposure: Yes    Smokeless tobacco: Never   Vaping Use    Vaping Use: Never used   Substance Use Topics    Alcohol use: No    Drug use: No       Review of Systems   Constitutional:  Negative for chills and fever. HENT:  Negative for ear pain and sore throat. Eyes:  Positive for discharge. Negative for pain and visual disturbance. Respiratory:  Negative for cough and shortness of breath. Cardiovascular:  Negative for chest pain and palpitations. Gastrointestinal:  Negative for abdominal pain and vomiting. Genitourinary:  Negative for dysuria and hematuria. Musculoskeletal:  Negative for arthralgias and back pain. Skin:  Negative for color change and rash. Neurological:  Negative for seizures and syncope. All other systems reviewed and are negative. Physical Exam  Physical Exam  Vitals and nursing note reviewed. Constitutional:       General: He is not in acute distress. Appearance: He is well-developed. Comments: Nonverbal at baseline, compliant and follows commands without difficulty   HENT:      Head: Normocephalic and atraumatic. Eyes:      Conjunctiva/sclera: Conjunctivae normal.      Comments: There is subconjunctival hemorrhage in the left eye consistent with postoperative state no active drainage. No photophobia. Cardiovascular:      Rate and Rhythm: Regular rhythm. Tachycardia present. Pulses: Normal pulses. Heart sounds: No murmur heard.   Pulmonary:      Effort: Pulmonary effort is normal. No respiratory distress. Breath sounds: Normal breath sounds. No wheezing. Abdominal:      Palpations: Abdomen is soft. Tenderness: There is no abdominal tenderness. Musculoskeletal:         General: No swelling. Normal range of motion. Cervical back: Normal range of motion and neck supple. Right lower leg: No edema. Left lower leg: No edema. Skin:     General: Skin is warm and dry. Capillary Refill: Capillary refill takes less than 2 seconds. Neurological:      Mental Status: He is alert. Mental status is at baseline. Psychiatric:         Mood and Affect: Mood normal.         Behavior: Behavior normal.         Vital Signs  ED Triage Vitals [12/09/23 1127]   Temperature Pulse Respirations Blood Pressure SpO2   98.2 °F (36.8 °C) (!) 135 20 139/100 100 %      Temp Source Heart Rate Source Patient Position - Orthostatic VS BP Location FiO2 (%)   Temporal Monitor Sitting Left arm --      Pain Score       --           Vitals:    12/09/23 1127   BP: 139/100   Pulse: (!) 135   Patient Position - Orthostatic VS: Sitting         Visual Acuity      ED Medications  Medications   ondansetron (ZOFRAN-ODT) dispersible tablet 4 mg (4 mg Oral Given 12/9/23 1212)       Diagnostic Studies  Results Reviewed       Procedure Component Value Units Date/Time    FLU/RSV/COVID - if FLU/RSV clinically relevant [[id number]]  (Normal) Collected: 12/09/23 1212    Lab Status: Final result Specimen: Nares from Nose Updated: 12/09/23 1255     SARS-CoV-2 Negative     INFLUENZA A PCR Negative     INFLUENZA B PCR Negative     RSV PCR Negative    Narrative:      FOR PEDIATRIC PATIENTS - copy/paste COVID Guidelines URL to browser: [email or web address]    SARS-CoV-2 assay is a Nucleic Acid Amplification assay intended for the  qualitative detection of nucleic acid from SARS-CoV-2 in nasopharyngeal  swabs.  Results are for the presumptive identification of SARS-CoV-2 RNA. Positive results are indicative of infection with SARS-CoV-2, the virus  causing COVID-19, but do not rule out bacterial infection or co-infection  with other viruses. Laboratories within the [facility] and its  territories are required to report all positive results to the appropriate  public health authorities. Negative results do not preclude SARS-CoV-2  infection and should not be used as the sole basis for treatment or other  patient management decisions. Negative results must be combined with  clinical observations, patient history, and epidemiological information. This test has not been FDA cleared or approved. This test has been authorized by FDA under an Emergency Use Authorization  (EUA). This test is only authorized for the duration of time the  declaration that circumstances exist justifying the authorization of the  emergency use of an in vitro diagnostic tests for detection of SARS-CoV-2  virus and/or diagnosis of COVID-19 infection under section 564(b)(1) of  the Act, 21 U. S.C. 175PZG-6(J)(0), unless the authorization is terminated  or revoked sooner. The test has been validated but independent review by FDA  and CLIA is pending. Test performed using [employer] GeneXpert: This RT-PCR assay targets N2,  a region unique to SARS-CoV-2. A conserved region in the E-gene was chosen  for pan-Sarbecovirus detection which includes SARS-CoV-2. According to CMS-2020-01-R, this platform meets the definition of high-throughput technology. Fingerstick Glucose (POCT) [[id number]]  (Abnormal) Collected: 12/09/23 1211    Lab Status: Final result Updated: 12/09/23 1213     POC Glucose 205 mg/dl                    XR chest 1 view portable    (Results Pending)              Procedures  Procedures         ED Course                               SBIRT 20yo+      Flowsheet Row Most Recent Value   Initial Alcohol Screen: US AUDIT-C     1.  How often do you have a drink containing alcohol? 0 Filed at: 12/09/2023 1129   2. How many drinks containing alcohol do you have on a typical day you are drinking? 0 Filed at: 12/09/2023 1129   3a. Male UNDER 65: How often do you have five or more drinks on one occasion? 0 Filed at: 12/09/2023 1129   Audit-C Score 0 Filed at: 12/09/2023 1129   PATRICIA: How many times in the past year have you. .. Used an illegal drug or used a prescription medication for non-medical reasons? Never Filed at: 12/09/2023 1129                      Medical Decision Making  41-year-old male vomiting coughing and 2 days postop ophthalmologically surgery    Problems Addressed:  Cough: acute illness or injury  Nausea and vomiting: acute illness or injury  Post-operative state: acute illness or injury    Amount and/or Complexity of Data Reviewed  Independent Historian: parent     Details: Both parents  External Data Reviewed: notes. Labs: ordered. Decision-making details documented in ED Course. Radiology: ordered and independent interpretation performed. Decision-making details documented in ED Course. Discussion of management or test interpretation with external provider(s): Discussed with Dr. Karyna Odonnell of [facility]. Reviewed physical exam findings as well as current treatment plan. Given it has been approximate 12 hours since last episode of vomiting, does not believe that the vomiting would be a result of elevated intraocular pressure. He did recommend however if symptoms persist there is any concern for persistent headache or vomiting that could be a symptom of increased intraocular pressure the patient should be transferred to [facility] emergency department in [place]. Risk  Prescription drug management. Risk Details: Reviewed instructions with parents who understand agree with treatment plan.   Dichter continuing drops as recommended by ophthalmologically surgeon, instructed to proceed either back to the emergency department or procedure of the [facility] in [place] emergency room if symptoms persist or worsen or if he has any severe headache eye pain or intractable vomiting. Disposition  Final diagnoses:   Nausea and vomiting   Cough   Post-operative state     Time reflects when diagnosis was documented in both MDM as applicable and the Disposition within this note       Time User Action Codes Description Comment    12/9/2023  1:12 PM Jamesetta Pain T Add [R11.2] Nausea and vomiting     12/9/2023  1:12 PM Hua Mcrae T Add [R05.9] Cough     12/9/2023  1:12 PM Kevin Cohen [M21.268] Post-operative state           ED Disposition       ED Disposition   Discharge    Condition   Stable    Date/Time   Sat Dec 9, 2023 [place] discharge to home/self care. Follow-up Information       Follow up With Specialties Details Why Bev Montenegro Dr, DO Family Medicine Schedule an appointment as soon as possible for a visit   [address] [facility] [address]  [address] [place] [address] Drive [address] [phone]              Discharge Medication List as of 12/9/2023  1:13 PM        START taking these medications    Details   !! benzonatate (TESSALON PERLES) 100 mg capsule Take 1 capsule (100 mg total) by mouth 3 (three) times a day as needed for cough, Starting Sat 12/9/2023, Normal      ondansetron (Zofran ODT) 4 mg disintegrating tablet Take 1 tablet (4 mg total) by mouth every 6 (six) hours as needed for nausea or vomiting, Starting Sat 12/9/2023, Normal       !! - Potential duplicate medications found. Please discuss with provider.         CONTINUE these medications which have NOT CHANGED    Details   polymyxin b-trimethoprim (POLYTRIM) ophthalmic solution Administer 1 drop into the left eye every 4 (four) hours, Historical Med      prednisoLONE acetate (PRED FORTE) 1 % ophthalmic suspension Administer 1 drop to both eyes 4 (four) times a day, Historical Med      Apple Cider Vinegar 600 MG CAPS Take 1,200 mg by mouth 3 (three) times a day with meals, Historical Med      ascorbic acid (VITAMIN C) 250 mg tablet Take 250 mg by mouth daily, Historical Med      !! benzonatate (TESSALON) 200 MG capsule Take 1 capsule (200 mg total) by mouth 3 (three) times a day as needed for cough, Starting Fri 3/17/2023, Normal      benzoyl peroxide 5 % gel Apply topically 2 (two) times a day, Starting Fri 9/8/2023, Normal      Blood Glucose Monitoring Suppl (OneTouch Verio Reflect) w/Device KIT Check blood sugars once daily. Please substitute with appropriate alternative as covered by patient's insurance. Dx: E11.65, Normal      cetirizine (EQ Allergy Relief, Cetirizine,) 10 mg tablet Take 1 tablet (10 mg total) by mouth daily, Starting Fri 9/8/2023, Normal      clindamycin (CLEOCIN T) 1 % external solution Apply topically 2 (two) times a day, Starting Fri 9/8/2023, Normal      Cyanocobalamin (VITAMIN B-12) 6000 MCG SUBL Place 6,000 mcg under the tongue daily, Historical Med      fluticasone (FLONASE) 50 mcg/act nasal spray 2 sprays into each nostril daily, Starting Mon 9/11/2023, Normal      gentamicin (GARAMYCIN) 0.3 % ophthalmic solution Administer 1 drop to the right eye every 4 (four) hours, Starting Fri 6/23/2023, Normal      glimepiride (AMARYL) 1 mg tablet Take 1 tablet (1 mg total) by mouth daily with breakfast, Starting Fri 9/8/2023, Until Mon 9/2/2024, Normal      glucose blood (OneTouch Verio) test strip Check blood sugars once daily. Please substitute with appropriate alternative as covered by patient's insurance. Dx: E11.65, Normal      metFORMIN (GLUCOPHAGE) 1000 MG tablet Take 1 tablet (1,000 mg total) by mouth 2 (two) times a day, Starting Fri 9/8/2023, Normal      NON FORMULARY Medical Marijuana, Historical Med      OneTouch Delica Lancets 99A MISC Check blood sugars once daily. Please substitute with appropriate alternative as covered by patient's insurance.  Dx: E11.65, Normal      predniSONE 10 mg tablet Take 3 tabs BID X 2 days, 2 tabs BID X 2 days, 1 tab BID X 2 days, 1 tab daily X 2 days, Normal      promethazine-dextromethorphan (PHENERGAN-DM) 6.25-15 mg/5 mL oral syrup Take 5 mL by mouth 4 (four) times a day as needed for cough, Starting Mon 11/6/2023, Normal      zinc sulfate (ZINCATE) 220 mg capsule Take 220 mg by mouth daily, Historical Med       !! - Potential duplicate medications found. Please discuss with provider. No discharge procedures on file.     PDMP Review       None            ED Provider  Electronically Signed by             Marcel Leavitt DO  12/09/23 [id number]

## 2023-12-09 NOTE — DISCHARGE INSTRUCTIONS
Please continue instructions provided by your eye surgeon. Take medications as directed. Return if any condition worsens especially develop severe headache, persistent vomiting.

## 2023-12-14 DIAGNOSIS — R05.9 COUGH: ICD-10-CM

## 2023-12-14 RX ORDER — BENZONATATE 100 MG/1
100 CAPSULE ORAL 3 TIMES DAILY PRN
Qty: 20 CAPSULE | Refills: 0 | Status: SHIPPED | OUTPATIENT
Start: 2023-12-14

## 2023-12-14 RX ORDER — AZITHROMYCIN 250 MG/1
TABLET, FILM COATED ORAL DAILY
Qty: 6 TABLET | Refills: 0 | Status: SHIPPED | OUTPATIENT
Start: 2023-12-14 | End: 2023-12-19

## 2023-12-14 NOTE — TELEPHONE ENCOUNTER
Mother called, patient was seen at ER, tessalon perles effective, but request a refill as well as a refill for Zpk. Patient also seems to be starting with a sinus infection.

## 2024-01-06 ENCOUNTER — APPOINTMENT (OUTPATIENT)
Dept: LAB | Facility: HOSPITAL | Age: 24
End: 2024-01-06
Payer: COMMERCIAL

## 2024-01-06 DIAGNOSIS — E11.9 TYPE 2 DIABETES MELLITUS WITHOUT COMPLICATION, WITHOUT LONG-TERM CURRENT USE OF INSULIN (HCC): ICD-10-CM

## 2024-01-06 LAB
ALBUMIN SERPL BCP-MCNC: 4.5 G/DL (ref 3.5–5)
ALP SERPL-CCNC: 59 U/L (ref 34–104)
ALT SERPL W P-5'-P-CCNC: 20 U/L (ref 7–52)
ANION GAP SERPL CALCULATED.3IONS-SCNC: 7 MMOL/L
AST SERPL W P-5'-P-CCNC: 16 U/L (ref 13–39)
BILIRUB SERPL-MCNC: 0.39 MG/DL (ref 0.2–1)
BUN SERPL-MCNC: 9 MG/DL (ref 5–25)
CALCIUM SERPL-MCNC: 9.2 MG/DL (ref 8.4–10.2)
CHLORIDE SERPL-SCNC: 103 MMOL/L (ref 96–108)
CHOLEST SERPL-MCNC: 159 MG/DL
CO2 SERPL-SCNC: 27 MMOL/L (ref 21–32)
CREAT SERPL-MCNC: 0.69 MG/DL (ref 0.6–1.3)
CREAT UR-MCNC: 217.3 MG/DL
EST. AVERAGE GLUCOSE BLD GHB EST-MCNC: 169 MG/DL
GFR SERPL CREATININE-BSD FRML MDRD: 132 ML/MIN/1.73SQ M
GLUCOSE P FAST SERPL-MCNC: 144 MG/DL (ref 65–99)
HBA1C MFR BLD: 7.5 %
HDLC SERPL-MCNC: 53 MG/DL
LDLC SERPL CALC-MCNC: 76 MG/DL (ref 0–100)
MICROALBUMIN UR-MCNC: 17.9 MG/L
MICROALBUMIN/CREAT 24H UR: 8 MG/G CREATININE (ref 0–30)
POTASSIUM SERPL-SCNC: 4 MMOL/L (ref 3.5–5.3)
PROT SERPL-MCNC: 6.8 G/DL (ref 6.4–8.4)
SODIUM SERPL-SCNC: 137 MMOL/L (ref 135–147)
TRIGL SERPL-MCNC: 150 MG/DL
TSH SERPL DL<=0.05 MIU/L-ACNC: 1.13 UIU/ML (ref 0.45–4.5)

## 2024-01-06 PROCEDURE — 82043 UR ALBUMIN QUANTITATIVE: CPT

## 2024-01-06 PROCEDURE — 80061 LIPID PANEL: CPT

## 2024-01-06 PROCEDURE — 36415 COLL VENOUS BLD VENIPUNCTURE: CPT

## 2024-01-06 PROCEDURE — 83036 HEMOGLOBIN GLYCOSYLATED A1C: CPT

## 2024-01-06 PROCEDURE — 80053 COMPREHEN METABOLIC PANEL: CPT

## 2024-01-06 PROCEDURE — 82570 ASSAY OF URINE CREATININE: CPT

## 2024-01-06 PROCEDURE — 84443 ASSAY THYROID STIM HORMONE: CPT

## 2024-01-11 ENCOUNTER — OFFICE VISIT (OUTPATIENT)
Dept: FAMILY MEDICINE CLINIC | Facility: CLINIC | Age: 24
End: 2024-01-11
Payer: COMMERCIAL

## 2024-01-11 VITALS
HEIGHT: 66 IN | BODY MASS INDEX: 28.73 KG/M2 | TEMPERATURE: 98.3 F | HEART RATE: 118 BPM | SYSTOLIC BLOOD PRESSURE: 118 MMHG | DIASTOLIC BLOOD PRESSURE: 74 MMHG | WEIGHT: 178.8 LBS | OXYGEN SATURATION: 98 %

## 2024-01-11 DIAGNOSIS — H33.23 BILATERAL RETINAL DETACHMENT: ICD-10-CM

## 2024-01-11 DIAGNOSIS — J30.2 SEASONAL ALLERGIES: ICD-10-CM

## 2024-01-11 DIAGNOSIS — E11.9 TYPE 2 DIABETES MELLITUS WITHOUT COMPLICATION, WITHOUT LONG-TERM CURRENT USE OF INSULIN (HCC): Primary | ICD-10-CM

## 2024-01-11 DIAGNOSIS — E55.9 VITAMIN D DEFICIENCY: ICD-10-CM

## 2024-01-11 DIAGNOSIS — F84.0 AUTISM: ICD-10-CM

## 2024-01-11 DIAGNOSIS — L70.0 ACNE VULGARIS: ICD-10-CM

## 2024-01-11 DIAGNOSIS — Z00.00 ANNUAL PHYSICAL EXAM: ICD-10-CM

## 2024-01-11 PROCEDURE — 99214 OFFICE O/P EST MOD 30 MIN: CPT | Performed by: FAMILY MEDICINE

## 2024-01-11 PROCEDURE — 99395 PREV VISIT EST AGE 18-39: CPT | Performed by: FAMILY MEDICINE

## 2024-01-11 NOTE — PATIENT INSTRUCTIONS
Medicare Preventive Visit Patient Instructions  Thank you for completing your Welcome to Medicare Visit or Medicare Annual Wellness Visit today. Your next wellness visit will be due in one year (1/11/2025).  The screening/preventive services that you may require over the next 5-10 years are detailed below. Some tests may not apply to you based off risk factors and/or age. Screening tests ordered at today's visit but not completed yet may show as past due. Also, please note that scanned in results may not display below.  Preventive Screenings:  Service Recommendations Previous Testing/Comments   Colorectal Cancer Screening  Colonoscopy    Fecal Occult Blood Test (FOBT)/Fecal Immunochemical Test (FIT)  Fecal DNA/Cologuard Test  Flexible Sigmoidoscopy Age: 45-75 years old   Colonoscopy: every 10 years (May be performed more frequently if at higher risk)  OR  FOBT/FIT: every 1 year  OR  Cologuard: every 3 years  OR  Sigmoidoscopy: every 5 years  Screening may be recommended earlier than age 45 if at higher risk for colorectal cancer. Also, an individualized decision between you and your healthcare provider will decide whether screening between the ages of 76-85 would be appropriate. Colonoscopy: Not on file  FOBT/FIT: Not on file  Cologuard: Not on file  Sigmoidoscopy: Not on file          Prostate Cancer Screening Individualized decision between patient and health care provider in men between ages of 55-69   Medicare will cover every 12 months beginning on the day after your 50th birthday PSA: No results in last 5 years     Screening Not Indicated     Hepatitis C Screening Once for adults born between 1945 and 1965  More frequently in patients at high risk for Hepatitis C Hep C Antibody: Not on file        Diabetes Screening 1-2 times per year if you're at risk for diabetes or have pre-diabetes Fasting glucose: 144 mg/dL (1/6/2024)  A1C: 7.5 % (1/6/2024)  Screening Not Indicated  History Diabetes   Cholesterol  Screening Once every 5 years if you don't have a lipid disorder. May order more often based on risk factors. Lipid panel: 01/06/2024  Screening Current      Other Preventive Screenings Covered by Medicare:  Abdominal Aortic Aneurysm (AAA) Screening: covered once if your at risk. You're considered to be at risk if you have a family history of AAA or a male between the age of 65-75 who smoking at least 100 cigarettes in your lifetime.  Lung Cancer Screening: covers low dose CT scan once per year if you meet all of the following conditions: (1) Age 55-77; (2) No signs or symptoms of lung cancer; (3) Current smoker or have quit smoking within the last 15 years; (4) You have a tobacco smoking history of at least 20 pack years (packs per day x number of years you smoked); (5) You get a written order from a healthcare provider.  Glaucoma Screening: covered annually if you're considered high risk: (1) You have diabetes OR (2) Family history of glaucoma OR (3)  aged 50 and older OR (4)  American aged 65 and older  Osteoporosis Screening: covered every 2 years if you meet one of the following conditions: (1) Have a vertebral abnormality; (2) On glucocorticoid therapy for more than 3 months; (3) Have primary hyperparathyroidism; (4) On osteoporosis medications and need to assess response to drug therapy.  HIV Screening: covered annually if you're between the age of 15-65. Also covered annually if you are younger than 15 and older than 65 with risk factors for HIV infection. For pregnant patients, it is covered up to 3 times per pregnancy.    Immunizations:  Immunization Recommendations   Influenza Vaccine Annual influenza vaccination during flu season is recommended for all persons aged >= 6 months who do not have contraindications   Pneumococcal Vaccine   * Pneumococcal conjugate vaccine = PCV13 (Prevnar 13), PCV15 (Vaxneuvance), PCV20 (Prevnar 20)  * Pneumococcal polysaccharide vaccine = PPSV23  (Pneumovax) Adults 19-65 yo with certain risk factors or if 65+ yo  If never received any pneumonia vaccine: recommend Prevnar 20 (PCV20)  Give PCV20 if previously received 1 dose of PCV13 or PPSV23   Hepatitis B Vaccine 3 dose series if at intermediate or high risk (ex: diabetes, end stage renal disease, liver disease)   Respiratory syncytial virus (RSV) Vaccine - COVERED BY MEDICARE PART D  * RSVPreF3 (Arexvy) CDC recommends that adults 60 years of age and older may receive a single dose of RSV vaccine using shared clinical decision-making (SCDM)   Tetanus (Td) Vaccine - COST NOT COVERED BY MEDICARE PART B Following completion of primary series, a booster dose should be given every 10 years to maintain immunity against tetanus. Td may also be given as tetanus wound prophylaxis.   Tdap Vaccine - COST NOT COVERED BY MEDICARE PART B Recommended at least once for all adults. For pregnant patients, recommended with each pregnancy.   Shingles Vaccine (Shingrix) - COST NOT COVERED BY MEDICARE PART B  2 shot series recommended in those 19 years and older who have or will have weakened immune systems or those 50 years and older     Health Maintenance Due:      Topic Date Due   • Hepatitis C Screening  Never done   • HIV Screening  Never done     Immunizations Due:      Topic Date Due   • COVID-19 Vaccine (1) Never done   • IPV Vaccine (3 of 3 - 4-dose series) 06/27/2003   • Pneumococcal Vaccine: Pediatrics (0 to 5 Years) and At-Risk Patients (6 to 64 Years) (1 - PCV) Never done   • HPV Vaccine (1 - Risk male 3-dose series) Never done     Advance Directives   What are advance directives?  Advance directives are legal documents that state your wishes and plans for medical care. These plans are made ahead of time in case you lose your ability to make decisions for yourself. Advance directives can apply to any medical decision, such as the treatments you want, and if you want to donate organs.   What are the types of advance  directives?  There are many types of advance directives, and each state has rules about how to use them. You may choose a combination of any of the following:  Living will:  This is a written record of the treatment you want. You can also choose which treatments you do not want, which to limit, and which to stop at a certain time. This includes surgery, medicine, IV fluid, and tube feedings.   Durable power of  for healthcare (DPAHC):  This is a written record that states who you want to make healthcare choices for you when you are unable to make them for yourself. This person, called a proxy, is usually a family member or a friend. You may choose more than 1 proxy.  Do not resuscitate (DNR) order:  A DNR order is used in case your heart stops beating or you stop breathing. It is a request not to have certain forms of treatment, such as CPR. A DNR order may be included in other types of advance directives.  Medical directive:  This covers the care that you want if you are in a coma, near death, or unable to make decisions for yourself. You can list the treatments you want for each condition. Treatment may include pain medicine, surgery, blood transfusions, dialysis, IV or tube feedings, and a ventilator (breathing machine).  Values history:  This document has questions about your views, beliefs, and how you feel and think about life. This information can help others choose the care that you would choose.  Why are advance directives important?  An advance directive helps you control your care. Although spoken wishes may be used, it is better to have your wishes written down. Spoken wishes can be misunderstood, or not followed. Treatments may be given even if you do not want them. An advance directive may make it easier for your family to make difficult choices about your care.   Weight Management   Why it is important to manage your weight:  Being overweight increases your risk of health conditions such as  heart disease, high blood pressure, type 2 diabetes, and certain types of cancer. It can also increase your risk for osteoarthritis, sleep apnea, and other respiratory problems. Aim for a slow, steady weight loss. Even a small amount of weight loss can lower your risk of health problems.  How to lose weight safely:  A safe and healthy way to lose weight is to eat fewer calories and get regular exercise. You can lose up about 1 pound a week by decreasing the number of calories you eat by 500 calories each day.   Healthy meal plan for weight management:  A healthy meal plan includes a variety of foods, contains fewer calories, and helps you stay healthy. A healthy meal plan includes the following:  Eat whole-grain foods more often.  A healthy meal plan should contain fiber. Fiber is the part of grains, fruits, and vegetables that is not broken down by your body. Whole-grain foods are healthy and provide extra fiber in your diet. Some examples of whole-grain foods are whole-wheat breads and pastas, oatmeal, brown rice, and bulgur.  Eat a variety of vegetables every day.  Include dark, leafy greens such as spinach, kale, shivam greens, and mustard greens. Eat yellow and orange vegetables such as carrots, sweet potatoes, and winter squash.   Eat a variety of fruits every day.  Choose fresh or canned fruit (canned in its own juice or light syrup) instead of juice. Fruit juice has very little or no fiber.  Eat low-fat dairy foods.  Drink fat-free (skim) milk or 1% milk. Eat fat-free yogurt and low-fat cottage cheese. Try low-fat cheeses such as mozzarella and other reduced-fat cheeses.  Choose meat and other protein foods that are low in fat.  Choose beans or other legumes such as split peas or lentils. Choose fish, skinless poultry (chicken or turkey), or lean cuts of red meat (beef or pork). Before you cook meat or poultry, cut off any visible fat.   Use less fat and oil.  Try baking foods instead of frying them. Add  less fat, such as margarine, sour cream, regular salad dressing and mayonnaise to foods. Eat fewer high-fat foods. Some examples of high-fat foods include french fries, doughnuts, ice cream, and cakes.  Eat fewer sweets.  Limit foods and drinks that are high in sugar. This includes candy, cookies, regular soda, and sweetened drinks.  Exercise:  Exercise at least 30 minutes per day on most days of the week. Some examples of exercise include walking, biking, dancing, and swimming. You can also fit in more physical activity by taking the stairs instead of the elevator or parking farther away from stores. Ask your healthcare provider about the best exercise plan for you.      © Copyright [employer] 2018 Information is for End User's use only and may not be sold, redistributed or otherwise used for commercial purposes. All illustrations and images included in CareNotes® are the copyrighted property of [employer]M., Inc. or Dr. Tariff

## 2024-01-11 NOTE — PROGRESS NOTES
Assessment and Plan: Diabetes mellitus type 2 with a hemoglobin A1c of 7.5 up from 6.2 September 2, 2023 diet has been reinforced    Bilateral retinal detachment has had surgery on the left eye at [facility]    Autism at baseline    Acne vulgaris treated with Cleocin 1% solution    Seasonal allergies treated with cetirizine and Flonase       Problem List Items Addressed This Visit     Seasonal allergies    Type 2 diabetes mellitus without complication, without long-term current use of insulin (HCC)    Relevant Orders    Albumin / creatinine urine ratio    Comprehensive metabolic panel    Hemoglobin A1C    CBC and Platelet    TSH, 3rd generation with Free T4 reflex    Lipid Panel with Direct LDL reflex    Acne vulgaris    Autism    Bilateral retinal detachment   Other Visit Diagnoses     Medicare annual wellness visit, subsequent    -  Primary    Vitamin D deficiency               Preventive health issues were discussed with patient, and age appropriate screening tests were ordered as noted in patient's After Visit Summary.  Personalized health advice and appropriate referrals for health education or preventive services given if needed, as noted in patient's After Visit Summary.     History of Present Illness:     Patient presents for a Medicare Wellness Visit    Patient presents for Medicare wellness visit       Patient Care Team:  Champ Kelly DO as PCP - General (Family Medicine)  Nam Carreno DO as PCP - PCP-[facility] ([facility])  Nam Carreno DO as PCP - PCP-[facility] ([facility])  Champ Kelly DO as PCP - PCP-[employer] ([facility])  Ruth Bishop PA-C as Physician Assistant (Behavioral Health)     Review of Systems:     Review of Systems   Constitutional:  Negative for chills and fever.   HENT:  Negative for ear pain and sore throat.    Eyes:  Positive for visual disturbance. Negative for pain.   Respiratory:  Negative for cough and shortness of breath.    Cardiovascular:  Negative for chest  pain and palpitations.   Gastrointestinal:  Negative for abdominal pain and vomiting.   Genitourinary:  Negative for dysuria and hematuria.   Musculoskeletal:  Negative for arthralgias and back pain.   Skin:  Negative for color change and rash.   Neurological:  Negative for seizures and syncope.   All other systems reviewed and are negative.       Problem List:     Patient Active Problem List   Diagnosis   • Intermittent explosive disorder   • Seasonal allergies   • Type 2 diabetes mellitus without complication, without long-term current use of insulin (HCC)   • Acne vulgaris   • Tachycardia   • Dyslipidemia   • Autism   • Bilateral retinal detachment      Past Medical and Surgical History:     Past Medical History:   Diagnosis Date   • Allergic    • Autism    • Diabetes mellitus (HCC)     type 2 without complications   • Non-verbal learning disorder     Autism   • Seasonal allergies      Past Surgical History:   Procedure Laterality Date   • CATARACT EXTRACTION, BILATERAL  2003      Family History:     Family History   Problem Relation Age of Onset   • No Known Problems Mother    • No Known Problems Father    • Alcohol abuse Maternal Grandmother    • Mental illness Maternal Aunt       Social History:     Social History     Socioeconomic History   • Marital status: Single     Spouse name: None   • Number of children: None   • Years of education: None   • Highest education level: None   Occupational History   • None   Tobacco Use   • Smoking status: Never     Passive exposure: Yes   • Smokeless tobacco: Never   Vaping Use   • Vaping status: Never Used   Substance and Sexual Activity   • Alcohol use: No   • Drug use: No   • Sexual activity: None   Other Topics Concern   • None   Social History Narrative    Parents smoke in the house, in basement     Social Determinants of Health     Financial Resource Strain: Not on file   Food Insecurity: Not on file   Transportation Needs: No Transportation Needs (1/11/2024)     PRAPARE - Transportation    • Lack of Transportation (Medical): No    • Lack of Transportation (Non-Medical): No   Physical Activity: Not on file   Stress: Not on file   Social Connections: Not on file   Intimate Partner Violence: Not on file   Housing Stability: Not on file      Medications and Allergies:     Current Outpatient Medications   Medication Sig Dispense Refill   • Apple Cider Vinegar 600 MG CAPS Take 1,200 mg by mouth 3 (three) times a day with meals     • ascorbic acid (VITAMIN C) 250 mg tablet Take 250 mg by mouth daily     • benzonatate (TESSALON PERLES) 100 mg capsule Take 1 capsule (100 mg total) by mouth 3 (three) times a day as needed for cough 20 capsule 0   • benzoyl peroxide 5 % gel Apply topically 2 (two) times a day 45 g 5   • cetirizine (EQ Allergy Relief, Cetirizine,) 10 mg tablet Take 1 tablet (10 mg total) by mouth daily 90 tablet 1   • clindamycin (CLEOCIN T) 1 % external solution Apply topically 2 (two) times a day 60 mL 5   • Cyanocobalamin (VITAMIN B-12) 6000 MCG SUBL Place 6,000 mcg under the tongue daily     • fluticasone (FLONASE) 50 mcg/act nasal spray 2 sprays into each nostril daily 48 g 3   • glimepiride (AMARYL) 1 mg tablet Take 1 tablet (1 mg total) by mouth daily with breakfast 90 tablet 3   • metFORMIN (GLUCOPHAGE) 1000 MG tablet Take 1 tablet (1,000 mg total) by mouth 2 (two) times a day 180 tablet 1   • ondansetron (Zofran ODT) 4 mg disintegrating tablet Take 1 tablet (4 mg total) by mouth every 6 (six) hours as needed for nausea or vomiting 30 tablet 0   • promethazine-dextromethorphan (PHENERGAN-DM) 6.25-15 mg/5 mL oral syrup Take 5 mL by mouth 4 (four) times a day as needed for cough 180 mL 1   • benzonatate (TESSALON) 200 MG capsule Take 1 capsule (200 mg total) by mouth 3 (three) times a day as needed for cough (Patient not taking: Reported on 5/8/2023) 20 capsule 0   • Blood Glucose Monitoring Suppl (OneTouch Verio Reflect) w/Device KIT Check blood sugars once  daily. Please substitute with appropriate alternative as covered by patient's insurance. Dx: E11.65 (Patient not taking: Reported on 1/11/2024) 1 kit 0   • gentamicin (GARAMYCIN) 0.3 % ophthalmic solution Administer 1 drop to the right eye every 4 (four) hours (Patient not taking: Reported on 1/11/2024) 5 mL 0   • glucose blood (OneTouch Verio) test strip Check blood sugars once daily. Please substitute with appropriate alternative as covered by patient's insurance. Dx: E11.65 (Patient not taking: Reported on 1/11/2024) 100 each 3   • NON FORMULARY Medical Marijuana (Patient not taking: Reported on 12/9/2023)     • OneTouch Delica Lancets 33G MISC Check blood sugars once daily. Please substitute with appropriate alternative as covered by patient's insurance. Dx: E11.65 (Patient not taking: Reported on 1/11/2024) 100 each 3   • polymyxin b-trimethoprim (POLYTRIM) ophthalmic solution Administer 1 drop into the left eye every 4 (four) hours (Patient not taking: Reported on 1/11/2024)     • prednisoLONE acetate (PRED FORTE) 1 % ophthalmic suspension Administer 1 drop to both eyes 4 (four) times a day (Patient not taking: Reported on 1/11/2024)     • predniSONE 10 mg tablet Take 3 tabs BID X 2 days, 2 tabs BID X 2 days, 1 tab BID X 2 days, 1 tab daily X 2 days (Patient not taking: Reported on 5/8/2023) 26 tablet 0   • zinc sulfate (ZINCATE) 220 mg capsule Take 220 mg by mouth daily (Patient not taking: Reported on 1/11/2024)       No current facility-administered medications for this visit.     No Known Allergies   Immunizations:     Immunization History   Administered Date(s) Administered   • DTaP 1999, 1999, 01/05/2000   • Hep B / HiB 01/05/2000   • HiB 1999, 1999   • Hib (PRP-T) 1999, 1999, 01/05/2000   • IPV 1999, 1999   • Meningococcal MCV4P 08/21/2018   • Tdap 04/19/2021      Health Maintenance:         Topic Date Due   • Hepatitis C Screening  Never done   • HIV  Screening  Never done         Topic Date Due   • COVID-19 Vaccine (1) Never done   • IPV Vaccine (3 of 3 - 4-dose series) 06/27/2003   • Pneumococcal Vaccine: Pediatrics (0 to 5 Years) and At-Risk Patients (6 to 64 Years) (1 - PCV) Never done   • HPV Vaccine (1 - Risk male 3-dose series) Never done      Medicare Screening Tests and Risk Assessments:     Last Medicare Wellness visit information reviewed, patient interviewed and updates made to the record today.      Health Risk Assessment:   Patient rates overall health as good. Patient feels that their physical health rating is same. Patient is satisfied with their life. Eyesight was rated as much worse. Hearing was rated as same. Patient feels that their emotional and mental health rating is same. Patients states they are sometimes angry. Patient states they are sometimes unusually tired/fatigued. Pain experienced in the last 7 days has been none. Patient states that he has experienced no weight loss or gain in last 6 months.     Fall Risk Screening:   In the past year, patient has experienced: no history of falling in past year      Home Safety:  Patient does not have trouble with stairs inside or outside of their home. Patient has working smoke alarms and has working carbon monoxide detector. Home safety hazards include: none.     Nutrition:   Current diet is Regular.     Medications:   Patient is not currently taking any over-the-counter supplements. Patient is able to manage medications.     Activities of Daily Living (ADLs)/Instrumental Activities of Daily Living (IADLs):   Walk and transfer into and out of bed and chair?: Yes  Dress and groom yourself?: Yes    Bathe or shower yourself?: Yes    Feed yourself? Yes  Do your laundry/housekeeping?: Yes  Manage your money, pay your bills and track your expenses?: Yes  Make your own meals?: Yes    Do your own shopping?: Yes    Previous Hospitalizations:   Any hospitalizations or ED visits within the last 12 months?:  "No      Advance Care Planning:   Living will: No    Durable POA for healthcare: No    Advanced directive: No      PREVENTIVE SCREENINGS      Cardiovascular Screening:    General: Screening Current      Diabetes Screening:     General: Screening Not Indicated and History Diabetes      Prostate Cancer Screening:    General: Screening Not Indicated      Lung Cancer Screening:     General: Screening Not Indicated    Screening, Brief Intervention, and Referral to Treatment (SBIRT)    Screening  Typical number of drinks in a day: 0  Typical number of drinks in a week: 0  Interpretation: Low risk drinking behavior.    No results found.     Physical Exam:     /74 (BP Location: Left arm, Patient Position: Sitting, Cuff Size: Adult)   Pulse (!) 118   Temp 98.3 °F (36.8 °C) (Tympanic)   Ht 5' 6\" (1.676 m)   Wt 81.1 kg (178 lb 12.8 oz)   SpO2 98%   BMI 28.86 kg/m²     Physical Exam  Vitals and nursing note reviewed.   Constitutional:       General: He is not in acute distress.     Appearance: He is well-developed.   HENT:      Head: Normocephalic and atraumatic.      Right Ear: Tympanic membrane, ear canal and external ear normal.      Left Ear: Tympanic membrane, ear canal and external ear normal.      Nose: Nose normal.      Mouth/Throat:      Mouth: Mucous membranes are moist.      Pharynx: Oropharynx is clear.   Eyes:      Extraocular Movements: Extraocular movements intact.      Conjunctiva/sclera: Conjunctivae normal.      Pupils: Pupils are equal, round, and reactive to light.   Cardiovascular:      Rate and Rhythm: Normal rate and regular rhythm.      Pulses: no weak pulses          Dorsalis pedis pulses are 2+ on the right side and 2+ on the left side.        Posterior tibial pulses are 2+ on the right side and 2+ on the left side.      Heart sounds: No murmur heard.  Pulmonary:      Effort: Pulmonary effort is normal. No respiratory distress.      Breath sounds: Normal breath sounds.   Abdominal:      " Palpations: Abdomen is soft.      Tenderness: There is no abdominal tenderness.   Musculoskeletal:         General: No swelling.      Cervical back: Neck supple.   Feet:      Right foot:      Skin integrity: No ulcer, skin breakdown, erythema, warmth, callus or dry skin.      Left foot:      Skin integrity: No ulcer, skin breakdown, erythema, warmth, callus or dry skin.   Skin:     General: Skin is warm and dry.      Capillary Refill: Capillary refill takes less than 2 seconds.   Neurological:      Mental Status: He is alert.   Psychiatric:         Mood and Affect: Mood normal.     Diabetic Foot Exam    Patient's shoes and socks removed.    Right Foot/Ankle   Right Foot Inspection  Skin Exam: skin normal and skin intact. No dry skin, no warmth, no callus, no erythema, no maceration, no abnormal color, no pre-ulcer, no ulcer and no callus.     Toe Exam: ROM and strength within normal limits.     Sensory   Vibration: intact  Proprioception: intact  Monofilament testing: intact    Vascular  Capillary refills: < 3 seconds  The right DP pulse is 2+. The right PT pulse is 2+.     Left Foot/Ankle  Left Foot Inspection  Skin Exam: skin normal and skin intact. No dry skin, no warmth, no erythema, no maceration, normal color, no pre-ulcer, no ulcer and no callus.     Toe Exam: ROM and strength within normal limits.     Sensory   Vibration: intact  Proprioception: intact  Monofilament testing: intact    Vascular  Capillary refills: < 3 seconds  The left DP pulse is 2+. The left PT pulse is 2+.     Assign Risk Category  No deformity present  No loss of protective sensation  No weak pulses  Risk: 0       Champ Kelly,

## 2024-03-04 ENCOUNTER — OFFICE VISIT (OUTPATIENT)
Dept: URGENT CARE | Facility: MEDICAL CENTER | Age: 24
End: 2024-03-04
Payer: COMMERCIAL

## 2024-03-04 VITALS
WEIGHT: 182.4 LBS | BODY MASS INDEX: 29.32 KG/M2 | RESPIRATION RATE: 18 BRPM | HEART RATE: 105 BPM | DIASTOLIC BLOOD PRESSURE: 86 MMHG | SYSTOLIC BLOOD PRESSURE: 124 MMHG | TEMPERATURE: 99 F | OXYGEN SATURATION: 98 % | HEIGHT: 66 IN

## 2024-03-04 DIAGNOSIS — B97.89 ACUTE VIRAL SINUSITIS: Primary | ICD-10-CM

## 2024-03-04 DIAGNOSIS — J01.90 ACUTE VIRAL SINUSITIS: Primary | ICD-10-CM

## 2024-03-04 PROCEDURE — 99213 OFFICE O/P EST LOW 20 MIN: CPT | Performed by: STUDENT IN AN ORGANIZED HEALTH CARE EDUCATION/TRAINING PROGRAM

## 2024-03-04 RX ORDER — AZITHROMYCIN 250 MG/1
TABLET, FILM COATED ORAL
Qty: 6 TABLET | Refills: 0 | Status: SHIPPED | OUTPATIENT
Start: 2024-03-04 | End: 2024-03-08

## 2024-03-04 NOTE — PATIENT INSTRUCTIONS
Saline sinus rinse (like NeilMed Sinus Rinse) twice daily followed by flonase 1 spray twice daily  Tylenol Sinus over the counter  Warm compresses over sinuses  Steam treatment (practice proper safety precautions when handing hot liquids/steam)  Over the counter saline nasal spray  Provided warning signs for superimposed bacterial infection.  Follow up with PCP in 3-5 days.  Proceed to  ER if symptoms worsen.

## 2024-03-04 NOTE — PROGRESS NOTES
[facility]        NAME: Piotr Fairchild is a 23 y.o. male  : 2000    MRN: [id number]    Assessment and Plan   Acute viral sinusitis [J01.90, B97.89]  1. Acute viral sinusitis  azithromycin (ZITHROMAX) 250 mg tablet        Low suspicion for bacterial etiology of presenting symptoms.  Discussed symptomatic and supportive measures for management of symptoms.  Will send in 1 week postdated Z-Ben if symptoms do not improve by then as patient is not very verbal and may not be able to provide accurate history regarding progression of his symptoms.    Patient Instructions     See wrap up for details  Follow up with PCP in 3-5 days.  Proceed to  ER if symptoms worsen.    Chief Complaint     Chief Complaint   Patient presents with    Cold Like Symptoms     Stuffy nose, sneezing, cough. Symptoms started yesterday. Mucinex DM taken for symptoms.          History of Present Illness     HPI    P/w mom who provides hx  Onset of symptoms yesterday with congestion, sneezing, nonproductive cough, sore throat  Denies fever, chills, nausea, vomiting, ear pain, diarrhea   Taking mucinex without improvement   Father and mother are sick with similar symptoms and tested negative for covid  Taking daily zyrtec and flonase once daily     Review of Systems   Review of Systems   Unable to perform ROS: Psychiatric disorder     Current Medications       Current Outpatient Medications:     Apple Cider Vinegar 600 MG CAPS, Take 1,200 mg by mouth 3 (three) times a day with meals, Disp: , Rfl:     ascorbic acid (VITAMIN C) 250 mg tablet, Take 250 mg by mouth daily, Disp: , Rfl:     azithromycin (ZITHROMAX) 250 mg tablet, Take 2 tablets today then 1 tablet daily x 4 days, Disp: 6 tablet, Rfl: 0    benzoyl peroxide 5 % gel, Apply topically 2 (two) times a day, Disp: 45 g, Rfl: 5    Blood Glucose Monitoring Suppl (OneTouch Verio Reflect) w/Device KIT, Check blood sugars once daily. Please substitute with appropriate alternative as  covered by patient's insurance. Dx: E11.65, Disp: 1 kit, Rfl: 0    cetirizine (EQ Allergy Relief, Cetirizine,) 10 mg tablet, Take 1 tablet (10 mg total) by mouth daily, Disp: 90 tablet, Rfl: 1    clindamycin (CLEOCIN T) 1 % external solution, Apply topically 2 (two) times a day, Disp: 60 mL, Rfl: 5    Cyanocobalamin (VITAMIN B-12) 6000 MCG SUBL, Place 6,000 mcg under the tongue daily, Disp: , Rfl:     fluticasone (FLONASE) 50 mcg/act nasal spray, 2 sprays into each nostril daily, Disp: 48 g, Rfl: 3    glimepiride (AMARYL) 1 mg tablet, Take 1 tablet (1 mg total) by mouth daily with breakfast, Disp: 90 tablet, Rfl: 3    glucose blood (OneTouch Verio) test strip, Check blood sugars once daily. Please substitute with appropriate alternative as covered by patient's insurance. Dx: E11.65, Disp: 100 each, Rfl: 3    metFORMIN (GLUCOPHAGE) 1000 MG tablet, Take 1 tablet (1,000 mg total) by mouth 2 (two) times a day, Disp: 180 tablet, Rfl: 1    ondansetron (Zofran ODT) 4 mg disintegrating tablet, Take 1 tablet (4 mg total) by mouth every 6 (six) hours as needed for nausea or vomiting, Disp: 30 tablet, Rfl: 0    benzonatate (TESSALON PERLES) 100 mg capsule, Take 1 capsule (100 mg total) by mouth 3 (three) times a day as needed for cough (Patient not taking: Reported on 3/4/2024), Disp: 20 capsule, Rfl: 0    benzonatate (TESSALON) 200 MG capsule, Take 1 capsule (200 mg total) by mouth 3 (three) times a day as needed for cough (Patient not taking: Reported on 5/8/2023), Disp: 20 capsule, Rfl: 0    gentamicin (GARAMYCIN) 0.3 % ophthalmic solution, Administer 1 drop to the right eye every 4 (four) hours (Patient not taking: Reported on 1/11/2024), Disp: 5 mL, Rfl: 0    NON FORMULARY, Medical Marijuana (Patient not taking: Reported on 12/9/2023), Disp: , Rfl:     OneTouch Delica Lancets 33G MISC, Check blood sugars once daily. Please substitute with appropriate alternative as covered by patient's insurance. Dx: E11.65 (Patient not  "taking: Reported on 1/11/2024), Disp: 100 each, Rfl: 3    polymyxin b-trimethoprim (POLYTRIM) ophthalmic solution, Administer 1 drop into the left eye every 4 (four) hours (Patient not taking: Reported on 1/11/2024), Disp: , Rfl:     prednisoLONE acetate (PRED FORTE) 1 % ophthalmic suspension, Administer 1 drop to both eyes 4 (four) times a day (Patient not taking: Reported on 1/11/2024), Disp: , Rfl:     predniSONE 10 mg tablet, Take 3 tabs BID X 2 days, 2 tabs BID X 2 days, 1 tab BID X 2 days, 1 tab daily X 2 days (Patient not taking: Reported on 5/8/2023), Disp: 26 tablet, Rfl: 0    promethazine-dextromethorphan (PHENERGAN-DM) 6.25-15 mg/5 mL oral syrup, Take 5 mL by mouth 4 (four) times a day as needed for cough (Patient not taking: Reported on 3/4/2024), Disp: 180 mL, Rfl: 1    zinc sulfate (ZINCATE) 220 mg capsule, Take 220 mg by mouth daily (Patient not taking: Reported on 1/11/2024), Disp: , Rfl:     Current Allergies     Allergies as of 03/04/2024    (No Known Allergies)            The following portions of the patient's history were reviewed and updated as appropriate: allergies, current medications, past family history, past medical history, past social history, past surgical history and problem list.     Past Medical History:   Diagnosis Date    Allergic     Autism     Diabetes mellitus (HCC)     type 2 without complications    Non-verbal learning disorder     Autism    Seasonal allergies        Past Surgical History:   Procedure Laterality Date    CATARACT EXTRACTION, BILATERAL  2003    RETINAL DETACHMENT SURGERY Bilateral        Family History   Problem Relation Age of Onset    No Known Problems Mother     No Known Problems Father     Alcohol abuse Maternal Grandmother     Mental illness Maternal Aunt          Medications have been verified.        Objective   /86   Pulse 105   Temp 99 °F (37.2 °C)   Resp 18   Ht 5' 6\" (1.676 m)   Wt 82.7 kg (182 lb 6.4 oz)   SpO2 98%   BMI 29.44 kg/m² "        Physical Exam     Physical Exam  Constitutional:       Appearance: Normal appearance.   HENT:      Head: Normocephalic and atraumatic.      Right Ear: A middle ear effusion is present. Tympanic membrane is not erythematous or bulging.      Left Ear: A middle ear effusion is present. Tympanic membrane is not erythematous or bulging.      Nose: Congestion present.      Right Sinus: Maxillary sinus tenderness and frontal sinus tenderness present.      Left Sinus: Maxillary sinus tenderness and frontal sinus tenderness present.      Mouth/Throat:      Mouth: Mucous membranes are moist.      Pharynx: Oropharynx is clear. Posterior oropharyngeal erythema present.   Eyes:      General: No scleral icterus.     Extraocular Movements: Extraocular movements intact.      Conjunctiva/sclera: Conjunctivae normal.   Cardiovascular:      Rate and Rhythm: Normal rate and regular rhythm.   Pulmonary:      Effort: Pulmonary effort is normal. No respiratory distress.      Breath sounds: Normal breath sounds.   Musculoskeletal:      Cervical back: Normal range of motion.   Lymphadenopathy:      Cervical: No cervical adenopathy.   Neurological:      General: No focal deficit present.      Mental Status: He is alert and oriented to person, place, and time.   Psychiatric:         Mood and Affect: Mood normal.         Behavior: Behavior normal.

## 2024-04-08 DIAGNOSIS — J30.2 SEASONAL ALLERGIES: ICD-10-CM

## 2024-04-09 RX ORDER — CETIRIZINE HYDROCHLORIDE 10 MG/1
10 TABLET ORAL DAILY
Qty: 90 TABLET | Refills: 1 | Status: SHIPPED | OUTPATIENT
Start: 2024-04-09

## 2024-05-16 ENCOUNTER — TELEPHONE (OUTPATIENT)
Dept: FAMILY MEDICINE CLINIC | Facility: CLINIC | Age: 24
End: 2024-05-16

## 2024-05-16 NOTE — TELEPHONE ENCOUNTER
Sent blood work Rx dated 1/11/24 to patient in the mail as a reminder to complete prior to his 5/30/24 apt

## 2024-05-17 DIAGNOSIS — E11.9 TYPE 2 DIABETES MELLITUS WITHOUT COMPLICATION, WITHOUT LONG-TERM CURRENT USE OF INSULIN (HCC): ICD-10-CM

## 2024-05-25 ENCOUNTER — APPOINTMENT (OUTPATIENT)
Dept: LAB | Facility: HOSPITAL | Age: 24
End: 2024-05-25
Payer: COMMERCIAL

## 2024-05-25 DIAGNOSIS — E11.9 TYPE 2 DIABETES MELLITUS WITHOUT COMPLICATION, WITHOUT LONG-TERM CURRENT USE OF INSULIN (HCC): ICD-10-CM

## 2024-05-25 LAB
ALBUMIN SERPL BCP-MCNC: 4.7 G/DL (ref 3.5–5)
ALP SERPL-CCNC: 47 U/L (ref 34–104)
ALT SERPL W P-5'-P-CCNC: 18 U/L (ref 7–52)
ANION GAP SERPL CALCULATED.3IONS-SCNC: 12 MMOL/L (ref 4–13)
AST SERPL W P-5'-P-CCNC: 15 U/L (ref 13–39)
BILIRUB SERPL-MCNC: 0.54 MG/DL (ref 0.2–1)
BUN SERPL-MCNC: 10 MG/DL (ref 5–25)
CALCIUM SERPL-MCNC: 9.8 MG/DL (ref 8.4–10.2)
CHLORIDE SERPL-SCNC: 104 MMOL/L (ref 96–108)
CHOLEST SERPL-MCNC: 156 MG/DL
CO2 SERPL-SCNC: 24 MMOL/L (ref 21–32)
CREAT SERPL-MCNC: 0.75 MG/DL (ref 0.6–1.3)
ERYTHROCYTE [DISTWIDTH] IN BLOOD BY AUTOMATED COUNT: 12.6 % (ref 11.6–15.1)
GFR SERPL CREATININE-BSD FRML MDRD: 128 ML/MIN/1.73SQ M
GLUCOSE P FAST SERPL-MCNC: 131 MG/DL (ref 65–99)
HCT VFR BLD AUTO: 43.1 % (ref 36.5–49.3)
HDLC SERPL-MCNC: 49 MG/DL
HGB BLD-MCNC: 13.9 G/DL (ref 12–17)
LDLC SERPL CALC-MCNC: 86 MG/DL (ref 0–100)
MCH RBC QN AUTO: 29.1 PG (ref 26.8–34.3)
MCHC RBC AUTO-ENTMCNC: 32.3 G/DL (ref 31.4–37.4)
MCV RBC AUTO: 90 FL (ref 82–98)
PLATELET # BLD AUTO: 236 THOUSANDS/UL (ref 149–390)
PMV BLD AUTO: 11 FL (ref 8.9–12.7)
POTASSIUM SERPL-SCNC: 4.2 MMOL/L (ref 3.5–5.3)
PROT SERPL-MCNC: 7 G/DL (ref 6.4–8.4)
RBC # BLD AUTO: 4.78 MILLION/UL (ref 3.88–5.62)
SODIUM SERPL-SCNC: 140 MMOL/L (ref 135–147)
TRIGL SERPL-MCNC: 107 MG/DL
TSH SERPL DL<=0.05 MIU/L-ACNC: 1.8 UIU/ML (ref 0.45–4.5)
WBC # BLD AUTO: 5.76 THOUSAND/UL (ref 4.31–10.16)

## 2024-05-25 PROCEDURE — 82570 ASSAY OF URINE CREATININE: CPT

## 2024-05-25 PROCEDURE — 82043 UR ALBUMIN QUANTITATIVE: CPT

## 2024-05-25 PROCEDURE — 80061 LIPID PANEL: CPT

## 2024-05-25 PROCEDURE — 85027 COMPLETE CBC AUTOMATED: CPT

## 2024-05-25 PROCEDURE — 36415 COLL VENOUS BLD VENIPUNCTURE: CPT

## 2024-05-25 PROCEDURE — 84443 ASSAY THYROID STIM HORMONE: CPT

## 2024-05-25 PROCEDURE — 80053 COMPREHEN METABOLIC PANEL: CPT

## 2024-05-25 PROCEDURE — 83036 HEMOGLOBIN GLYCOSYLATED A1C: CPT

## 2024-05-26 LAB
CREAT UR-MCNC: 167.5 MG/DL
EST. AVERAGE GLUCOSE BLD GHB EST-MCNC: 151 MG/DL
HBA1C MFR BLD: 6.9 %
MICROALBUMIN UR-MCNC: 61.9 MG/L
MICROALBUMIN/CREAT 24H UR: 37 MG/G CREATININE (ref 0–30)

## 2024-05-30 ENCOUNTER — OFFICE VISIT (OUTPATIENT)
Dept: FAMILY MEDICINE CLINIC | Facility: CLINIC | Age: 24
End: 2024-05-30
Payer: COMMERCIAL

## 2024-05-30 VITALS
BODY MASS INDEX: 29.41 KG/M2 | RESPIRATION RATE: 20 BRPM | DIASTOLIC BLOOD PRESSURE: 80 MMHG | SYSTOLIC BLOOD PRESSURE: 124 MMHG | HEART RATE: 76 BPM | TEMPERATURE: 97.7 F | WEIGHT: 183 LBS | HEIGHT: 66 IN

## 2024-05-30 DIAGNOSIS — H33.23 BILATERAL RETINAL DETACHMENT: ICD-10-CM

## 2024-05-30 DIAGNOSIS — E78.5 DYSLIPIDEMIA: ICD-10-CM

## 2024-05-30 DIAGNOSIS — F84.0 AUTISM: ICD-10-CM

## 2024-05-30 DIAGNOSIS — E11.9 TYPE 2 DIABETES MELLITUS WITHOUT COMPLICATION, WITHOUT LONG-TERM CURRENT USE OF INSULIN (HCC): Primary | ICD-10-CM

## 2024-05-30 DIAGNOSIS — E55.9 VITAMIN D DEFICIENCY: ICD-10-CM

## 2024-05-30 PROCEDURE — 99214 OFFICE O/P EST MOD 30 MIN: CPT | Performed by: FAMILY MEDICINE

## 2024-05-30 NOTE — PROGRESS NOTES
Assessment/Plan: Diabetes mellitus type 2 with a hemoglobin A1c at 6.9 and her sugar well-controlled    Hyperlipidemia total cholesterol 156 triglycerides of 107 HDL of 49 and LDL of 86    Autistic disorder at baseline    Bilateral retinal detachments under the care of ophthalmology    Problem List Items Addressed This Visit     Type 2 diabetes mellitus without complication, without long-term current use of insulin (HCC) - Primary    Relevant Orders    Albumin / creatinine urine ratio    Comprehensive metabolic panel    Hemoglobin A1C    CBC and differential    Dyslipidemia    Autism    Bilateral retinal detachment   Other Visit Diagnoses     Vitamin D deficiency                 Diagnoses and all orders for this visit:    Type 2 diabetes mellitus without complication, without long-term current use of insulin (HCC)  -     Albumin / creatinine urine ratio; Future  -     Comprehensive metabolic panel; Future  -     Hemoglobin A1C; Future  -     CBC and differential; Future    Dyslipidemia    Autism    Bilateral retinal detachment    Vitamin D deficiency        No problem-specific Assessment & Plan notes found for this encounter.      PHQ-2/9 Depression Screening    Little interest or pleasure in doing things: 0 - not at all  Feeling down, depressed, or hopeless: 0 - not at all          Body mass index is 29.54 kg/m².    BMI Counseling: Body mass index is 29.54 kg/m². The BMI     Subjective:      Patient ID: Piotr Fairchild is a 23 y.o. male.    Patient presents for 4-month checkup accompanied by his mother        The following portions of the patient's history were reviewed and updated as appropriate:   He has a past medical history of Allergic, Autism, Diabetes mellitus (HCC), Non-verbal learning disorder, and Seasonal allergies.,  does not have any pertinent problems on file.,   has a past surgical history that includes Cataract extraction, bilateral (2003) and Retinal detachment surgery (Bilateral).,  family history  includes Alcohol abuse in his maternal grandmother; Mental illness in his maternal aunt; No Known Problems in his father and mother.,   reports that he has never smoked. He has been exposed to tobacco smoke. He has never used smokeless tobacco. He reports that he does not drink alcohol and does not use drugs.,  has No Known Allergies..  Current Outpatient Medications   Medication Sig Dispense Refill   • prednisoLONE acetate (PRED FORTE) 1 % ophthalmic suspension Administer 1 drop to both eyes 3 (three) times a day     • ascorbic acid (VITAMIN C) 250 mg tablet Take 250 mg by mouth daily     • benzoyl peroxide 5 % gel Apply topically 2 (two) times a day 45 g 5   • Blood Glucose Monitoring Suppl (OneTouch Verio Reflect) w/Device KIT Check blood sugars once daily. Please substitute with appropriate alternative as covered by patient's insurance. Dx: E11.65 1 kit 0   • cetirizine (ZyrTEC) 10 mg tablet take 1 tablet by mouth once daily 90 tablet 1   • clindamycin (CLEOCIN T) 1 % external solution Apply topically 2 (two) times a day 60 mL 5   • Cyanocobalamin (VITAMIN B-12) 6000 MCG SUBL Place 6,000 mcg under the tongue daily     • fluticasone (FLONASE) 50 mcg/act nasal spray 2 sprays into each nostril daily 48 g 3   • glimepiride (AMARYL) 1 mg tablet Take 1 tablet (1 mg total) by mouth daily with breakfast 90 tablet 3   • glucose blood (OneTouch Verio) test strip Check blood sugars once daily. Please substitute with appropriate alternative as covered by patient's insurance. Dx: E11.65 100 each 3   • metFORMIN (GLUCOPHAGE) 1000 MG tablet take 1 tablet by mouth twice a day 180 tablet 1   • ondansetron (Zofran ODT) 4 mg disintegrating tablet Take 1 tablet (4 mg total) by mouth every 6 (six) hours as needed for nausea or vomiting 30 tablet 0   • OneTouch Delica Lancets 33G MISC Check blood sugars once daily. Please substitute with appropriate alternative as covered by patient's insurance. Dx: E11.65 (Patient not taking:  "Reported on 1/11/2024) 100 each 3     No current facility-administered medications for this visit.       Review of Systems   Constitutional:  Negative for chills and fever.   HENT:  Negative for ear pain and sore throat.    Eyes:  Positive for visual disturbance. Negative for pain.        History of bilateral retinal detachments   Respiratory:  Negative for cough and shortness of breath.    Cardiovascular:  Negative for chest pain and palpitations.   Gastrointestinal:  Negative for abdominal pain and vomiting.   Genitourinary:  Negative for dysuria and hematuria.   Musculoskeletal:  Negative for arthralgias and back pain.   Skin:  Negative for color change and rash.   Neurological:  Negative for seizures and syncope.   All other systems reviewed and are negative.        Objective:    /80   Pulse 76   Temp 97.7 °F (36.5 °C)   Resp 20   Ht 5' 6\" (1.676 m)   Wt 83 kg (183 lb)   BMI 29.54 kg/m²   Body mass index is 29.54 kg/m².     Physical Exam  Constitutional:       Appearance: Normal appearance. He is well-developed.   HENT:      Head: Normocephalic and atraumatic.      Right Ear: Tympanic membrane, ear canal and external ear normal.      Left Ear: Tympanic membrane, ear canal and external ear normal.      Nose: Nose normal.      Mouth/Throat:      Mouth: Mucous membranes are moist.      Pharynx: Oropharynx is clear.   Eyes:      Extraocular Movements: Extraocular movements intact.      Conjunctiva/sclera: Conjunctivae normal.      Pupils: Pupils are equal, round, and reactive to light.   Cardiovascular:      Rate and Rhythm: Normal rate and regular rhythm.      Pulses: Normal pulses.      Heart sounds: Normal heart sounds.   Pulmonary:      Effort: Pulmonary effort is normal.      Breath sounds: Normal breath sounds.   Abdominal:      General: Abdomen is flat. Bowel sounds are normal.      Palpations: Abdomen is soft.      Tenderness: There is no abdominal tenderness.   Musculoskeletal:         General: " Normal range of motion.      Cervical back: Normal range of motion and neck supple.   Skin:     General: Skin is warm and dry.      Capillary Refill: Capillary refill takes less than 2 seconds.   Neurological:      General: No focal deficit present.      Mental Status: He is alert and oriented to person, place, and time.   Psychiatric:         Mood and Affect: Mood normal.         Behavior: Behavior normal.         Thought Content: Thought content normal.         Judgment: Judgment normal.

## 2024-09-21 ENCOUNTER — HOSPITAL ENCOUNTER (EMERGENCY)
Facility: HOSPITAL | Age: 24
Discharge: HOME | End: 2024-09-21
Attending: EMERGENCY MEDICINE | Admitting: EMERGENCY MEDICINE
Payer: COMMERCIAL

## 2024-09-21 ENCOUNTER — APPOINTMENT (EMERGENCY)
Dept: RADIOLOGY | Facility: HOSPITAL | Age: 24
End: 2024-09-21
Payer: COMMERCIAL

## 2024-09-21 VITALS
RESPIRATION RATE: 18 BRPM | DIASTOLIC BLOOD PRESSURE: 95 MMHG | OXYGEN SATURATION: 96 % | SYSTOLIC BLOOD PRESSURE: 134 MMHG | TEMPERATURE: 98.8 F | HEART RATE: 109 BPM

## 2024-09-21 DIAGNOSIS — J40 BRONCHITIS: Primary | ICD-10-CM

## 2024-09-21 LAB
FLUAV AG UPPER RESP QL IA.RAPID: NEGATIVE
FLUBV AG UPPER RESP QL IA.RAPID: NEGATIVE
S PYO DNA THROAT QL NAA+PROBE: NOT DETECTED
SARS-COV+SARS-COV-2 AG RESP QL IA.RAPID: NEGATIVE

## 2024-09-21 PROCEDURE — 99283 EMERGENCY DEPT VISIT LOW MDM: CPT

## 2024-09-21 PROCEDURE — 99284 EMERGENCY DEPT VISIT MOD MDM: CPT | Performed by: PHYSICIAN ASSISTANT

## 2024-09-21 PROCEDURE — 87804 INFLUENZA ASSAY W/OPTIC: CPT | Performed by: PHYSICIAN ASSISTANT

## 2024-09-21 PROCEDURE — 87811 SARS-COV-2 COVID19 W/OPTIC: CPT | Performed by: PHYSICIAN ASSISTANT

## 2024-09-21 PROCEDURE — 87651 STREP A DNA AMP PROBE: CPT | Performed by: PHYSICIAN ASSISTANT

## 2024-09-21 PROCEDURE — 71045 X-RAY EXAM CHEST 1 VIEW: CPT

## 2024-09-21 RX ORDER — BENZONATATE 100 MG/1
100 CAPSULE ORAL EVERY 8 HOURS
Qty: 21 CAPSULE | Refills: 0 | Status: SHIPPED | OUTPATIENT
Start: 2024-09-21

## 2024-09-21 RX ORDER — AMOXICILLIN 500 MG/1
500 CAPSULE ORAL EVERY 8 HOURS SCHEDULED
Qty: 21 CAPSULE | Refills: 0 | Status: SHIPPED | OUTPATIENT
Start: 2024-09-21 | End: 2024-09-28

## 2024-09-21 RX ORDER — ONDANSETRON 4 MG/1
4 TABLET, FILM COATED ORAL EVERY 8 HOURS PRN
Qty: 20 TABLET | Refills: 0 | Status: SHIPPED | OUTPATIENT
Start: 2024-09-21

## 2024-09-21 NOTE — ED PROVIDER NOTES
1. Bronchitis      ED Disposition       ED Disposition   Discharge    Condition   Stable    Date/Time   Sat Sep 21, 2024 10:37 AM    Comment   Piotr Fairchild discharge to home/self care.                   Assessment & Plan       Medical Decision Making  24-year-old male here for evaluation of cough congestion sore throat ongoing for 10 days.  Nonverbal at baseline vital signs reviewed.  Differential diagnosis includes pneumonia, COVID, influenza, strep throat, viral upper respiratory tract infection.    COVID influenza RSV antigen negative strep throat negative chest x-ray does not reveal acute consolidative processes likely fighting a bronchitis given his history, overall cognitive status had mild tachycardia reasonable to treat empirically.    Amount and/or Complexity of Data Reviewed  Labs: ordered. Decision-making details documented in ED Course.  Radiology: ordered and independent interpretation performed.    Risk  Prescription drug management.                ED Course as of 09/21/24 1039   Sat Sep 21, 2024   0915 SpO2: 96 %   0915 Respirations: 18   0915 Pulse(!): 109   0915 Temperature: 98.8 °F (37.1 °C)   0915 Blood Pressure: 134/95  Reviewed mild tachycardia otherwise within normal limits   1018 Influenza B Rapid Antigen: Negative   1018 Influenza A Rapid Antigen: Negative   1018 SARS COV Rapid Antigen: Negative   1037 STREP A PCR: Not Detected       Medications - No data to display    History of Present Illness       This is a 24-year-old male presenting to the emergency department today for evaluation of upper respiratory type symptoms that have been ongoing for about a week to 10 days.  Presents with family.  Patient is nonverbal at baseline.  History is a cough that produces mucus ongoing for about 7 to 10 days.  They have not actually taken the patient's temperature.  Afebrile here today.  There is also history of sore throat nasal congestion as well.  There is an individual in the household who has  otitis media and sinusitis currently on antibiotics.  Vital signs reviewed within reasonable limits heart rate 99 beats at bedside exam.        Review of Systems   Constitutional: Negative.  Negative for activity change, appetite change, chills, diaphoresis, fatigue, fever and unexpected weight change.   HENT:  Positive for congestion and sore throat. Negative for trouble swallowing and voice change.    Eyes: Negative.    Respiratory:  Positive for cough. Negative for chest tightness, shortness of breath and wheezing.    Cardiovascular: Negative.  Negative for chest pain, palpitations and leg swelling.   Gastrointestinal: Negative.  Negative for abdominal pain, blood in stool, nausea and vomiting.   Endocrine: Negative.    Genitourinary: Negative.  Negative for flank pain and hematuria.   Musculoskeletal: Negative.  Negative for arthralgias, back pain, gait problem, joint swelling, myalgias, neck pain and neck stiffness.   Skin: Negative.  Negative for rash and wound.   Allergic/Immunologic: Negative.    Neurological: Negative.  Negative for dizziness, seizures, syncope, weakness, light-headedness and headaches.   Hematological: Negative.    Psychiatric/Behavioral: Negative.     All other systems reviewed and are negative.          Objective     ED Triage Vitals [09/21/24 0912]   Temperature Pulse Blood Pressure Respirations SpO2 Patient Position - Orthostatic VS   98.8 °F (37.1 °C) (!) 109 134/95 18 96 % Sitting      Temp Source Heart Rate Source BP Location FiO2 (%) Pain Score    Temporal Monitor Left arm -- --        Physical Exam  Constitutional:       General: He is not in acute distress.     Appearance: He is well-developed. He is not ill-appearing, toxic-appearing or diaphoretic.   HENT:      Right Ear: Ear canal and external ear normal. No swelling. There is no impacted cerumen. Tympanic membrane is not bulging.      Left Ear: Ear canal and external ear normal. No swelling. There is no impacted cerumen.  Tympanic membrane is not bulging.      Ears:      Comments: Bilateral injected tympanic membranes no bulging     Nose: Congestion present.      Mouth/Throat:      Pharynx: Posterior oropharyngeal erythema present. No oropharyngeal exudate.   Eyes:      General: Lids are normal. No scleral icterus.        Right eye: No discharge.         Left eye: No discharge.      Conjunctiva/sclera: Conjunctivae normal.      Pupils: Pupils are equal, round, and reactive to light.   Neck:      Thyroid: No thyromegaly.      Vascular: No JVD.      Trachea: No tracheal deviation.   Cardiovascular:      Rate and Rhythm: Normal rate and regular rhythm.      Pulses: Normal pulses.      Heart sounds: Normal heart sounds. No murmur heard.     No friction rub. No gallop.   Pulmonary:      Effort: Pulmonary effort is normal. No respiratory distress.      Breath sounds: Normal breath sounds. No stridor. No wheezing, rhonchi or rales.   Chest:      Chest wall: No tenderness.   Abdominal:      General: Bowel sounds are normal. There is no distension.      Palpations: Abdomen is soft. There is no mass.      Tenderness: There is no abdominal tenderness. There is no guarding or rebound.      Hernia: No hernia is present.   Musculoskeletal:         General: Normal range of motion.      Cervical back: Normal range of motion and neck supple. No edema. Normal range of motion.   Lymphadenopathy:      Cervical: No cervical adenopathy.   Skin:     General: Skin is warm and dry.      Coloration: Skin is not pale.      Findings: No erythema or rash.   Neurological:      Mental Status: He is alert and oriented to person, place, and time.      GCS: GCS eye subscore is 4. GCS verbal subscore is 5. GCS motor subscore is 6.      Cranial Nerves: No cranial nerve deficit.      Sensory: No sensory deficit.      Deep Tendon Reflexes: Reflexes are normal and symmetric.   Psychiatric:         Speech: Speech normal.         Behavior: Behavior normal.         Labs  Reviewed   COVID-19/INFLUENZA A/B RAPID ANTIGEN (30 MIN.TAT) - Normal       Result Value    SARS COV Rapid Antigen Negative      Influenza A Rapid Antigen Negative      Influenza B Rapid Antigen Negative      Narrative:     This test has been performed using the Acrisure Lydia 2 FLU+SARS Antigen test under the Emergency Use Authorization (EUA). This test has been validated by the  and verified by the performing laboratory. The Lydia uses lateral flow immunofluorescent sandwich assay to detect SARS-COV, Influenza A and Influenza B Antigen.     The Quidel Lydia 2 SARS Antigen test does not differentiate between SARS-CoV and SARS-CoV-2.     Negative results are presumptive and may be confirmed with a molecular assay, if necessary, for patient management. Negative results do not rule out SARS-CoV-2 or influenza infection and should not be used as the sole basis for treatment or patient management decisions. A negative test result may occur if the level of antigen in a sample is below the limit of detection of this test.     Positive results are indicative of the presence of viral antigens, but do not rule out bacterial infection or co-infection with other viruses.     All test results should be used as an adjunct to clinical observations and other information available to the provider.    FOR PEDIATRIC PATIENTS - copy/paste COVID Guidelines URL to browser: [email or web address]   STREP A PCR - Normal    STREP A PCR Not Detected       XR chest 1 view portable   ED Interpretation by Antonio Johnston PA-C (09/21 [id number])   Personally interpreted I do not see pneumothorax.  Slightly poor inspirational effort however no evidence of consolidative process          Procedures    ED Medication and Procedure Management   Prior to Admission Medications   Prescriptions Last Dose Informant Patient Reported? Taking?   Blood Glucose Monitoring Suppl (OneTouch Verio  Reflect) w/Device KIT  Self No No   Sig: Check blood sugars once daily. Please substitute with appropriate alternative as covered by patient's insurance. Dx: E11.65   Cyanocobalamin (VITAMIN B-12) 6000 MCG SUBL  Father, Self Yes No   Sig: Place 6,000 mcg under the tongue daily   OneTouch Delica Lancets 33G MISC  Self No No   Sig: Check blood sugars once daily. Please substitute with appropriate alternative as covered by patient's insurance. Dx: E11.65   Patient not taking: Reported on 2024   ascorbic acid (VITAMIN C) 250 mg tablet  Self Yes No   Sig: Take 250 mg by mouth daily   benzoyl peroxide 5 % gel  Self No No   Sig: Apply topically 2 (two) times a day   cetirizine (ZyrTEC) 10 mg tablet   No No   Sig: take 1 tablet by mouth once daily   clindamycin (CLEOCIN T) 1 % external solution  Self No No   Sig: Apply topically 2 (two) times a day   fluticasone (FLONASE) 50 mcg/act nasal spray  Self No No   Si sprays into each nostril daily   glimepiride (AMARYL) 1 mg tablet  Self No No   Sig: Take 1 tablet (1 mg total) by mouth daily with breakfast   glucose blood (OneTouch Verio) test strip  Self No No   Sig: Check blood sugars once daily. Please substitute with appropriate alternative as covered by patient's insurance. Dx: E11.65   metFORMIN (GLUCOPHAGE) 1000 MG tablet   No No   Sig: take 1 tablet by mouth twice a day   ondansetron (Zofran ODT) 4 mg disintegrating tablet  Self No No   Sig: Take 1 tablet (4 mg total) by mouth every 6 (six) hours as needed for nausea or vomiting   prednisoLONE acetate (PRED FORTE) 1 % ophthalmic suspension  Self Yes No   Sig: Administer 1 drop to both eyes 3 (three) times a day      Facility-Administered Medications: None     Patient's Medications   Discharge Prescriptions    AMOXICILLIN (AMOXIL) 500 MG CAPSULE    Take 1 capsule (500 mg total) by mouth every 8 (eight) hours for 7 days       Start Date: 2024 End Date: 2024       Order Dose: 500 mg       Quantity: 21  capsule    Refills: 0     No discharge procedures on file.     Antonio Johnston PA-C  09/21/24 [id number]

## 2024-09-28 ENCOUNTER — APPOINTMENT (OUTPATIENT)
Dept: LAB | Facility: HOSPITAL | Age: 24
End: 2024-09-28
Payer: COMMERCIAL

## 2024-09-28 DIAGNOSIS — E11.9 TYPE 2 DIABETES MELLITUS WITHOUT COMPLICATION, WITHOUT LONG-TERM CURRENT USE OF INSULIN (HCC): ICD-10-CM

## 2024-09-28 LAB
ALBUMIN SERPL BCG-MCNC: 4.7 G/DL (ref 3.5–5)
ALP SERPL-CCNC: 61 U/L (ref 34–104)
ALT SERPL W P-5'-P-CCNC: 22 U/L (ref 7–52)
ANION GAP SERPL CALCULATED.3IONS-SCNC: 11 MMOL/L (ref 4–13)
AST SERPL W P-5'-P-CCNC: 14 U/L (ref 13–39)
BASOPHILS # BLD AUTO: 0.05 THOUSANDS/ΜL (ref 0–0.1)
BASOPHILS NFR BLD AUTO: 1 % (ref 0–1)
BILIRUB SERPL-MCNC: 0.36 MG/DL (ref 0.2–1)
BUN SERPL-MCNC: 12 MG/DL (ref 5–25)
CALCIUM SERPL-MCNC: 9.1 MG/DL (ref 8.4–10.2)
CHLORIDE SERPL-SCNC: 109 MMOL/L (ref 96–108)
CO2 SERPL-SCNC: 16 MMOL/L (ref 21–32)
CREAT SERPL-MCNC: 0.78 MG/DL (ref 0.6–1.3)
CREAT UR-MCNC: 89.3 MG/DL
EOSINOPHIL # BLD AUTO: 0.08 THOUSAND/ΜL (ref 0–0.61)
EOSINOPHIL NFR BLD AUTO: 1 % (ref 0–6)
ERYTHROCYTE [DISTWIDTH] IN BLOOD BY AUTOMATED COUNT: 13.6 % (ref 11.6–15.1)
EST. AVERAGE GLUCOSE BLD GHB EST-MCNC: 143 MG/DL
GFR SERPL CREATININE-BSD FRML MDRD: 125 ML/MIN/1.73SQ M
GLUCOSE P FAST SERPL-MCNC: 170 MG/DL (ref 65–99)
HBA1C MFR BLD: 6.6 %
HCT VFR BLD AUTO: 41.9 % (ref 36.5–49.3)
HGB BLD-MCNC: 14.3 G/DL (ref 12–17)
IMM GRANULOCYTES # BLD AUTO: 0.03 THOUSAND/UL (ref 0–0.2)
IMM GRANULOCYTES NFR BLD AUTO: 0 % (ref 0–2)
LYMPHOCYTES # BLD AUTO: 2.15 THOUSANDS/ΜL (ref 0.6–4.47)
LYMPHOCYTES NFR BLD AUTO: 30 % (ref 14–44)
MCH RBC QN AUTO: 30.4 PG (ref 26.8–34.3)
MCHC RBC AUTO-ENTMCNC: 34.1 G/DL (ref 31.4–37.4)
MCV RBC AUTO: 89 FL (ref 82–98)
MICROALBUMIN UR-MCNC: 8.1 MG/L
MICROALBUMIN/CREAT 24H UR: 9 MG/G CREATININE (ref 0–30)
MONOCYTES # BLD AUTO: 0.44 THOUSAND/ΜL (ref 0.17–1.22)
MONOCYTES NFR BLD AUTO: 6 % (ref 4–12)
NEUTROPHILS # BLD AUTO: 4.53 THOUSANDS/ΜL (ref 1.85–7.62)
NEUTS SEG NFR BLD AUTO: 62 % (ref 43–75)
NRBC BLD AUTO-RTO: 0 /100 WBCS
PLATELET # BLD AUTO: 240 THOUSANDS/UL (ref 149–390)
PMV BLD AUTO: 11 FL (ref 8.9–12.7)
POTASSIUM SERPL-SCNC: 3.8 MMOL/L (ref 3.5–5.3)
PROT SERPL-MCNC: 7.2 G/DL (ref 6.4–8.4)
RBC # BLD AUTO: 4.71 MILLION/UL (ref 3.88–5.62)
SODIUM SERPL-SCNC: 136 MMOL/L (ref 135–147)
WBC # BLD AUTO: 7.28 THOUSAND/UL (ref 4.31–10.16)

## 2024-09-28 PROCEDURE — 80053 COMPREHEN METABOLIC PANEL: CPT

## 2024-09-28 PROCEDURE — 36415 COLL VENOUS BLD VENIPUNCTURE: CPT

## 2024-09-28 PROCEDURE — 82043 UR ALBUMIN QUANTITATIVE: CPT

## 2024-09-28 PROCEDURE — 85025 COMPLETE CBC W/AUTO DIFF WBC: CPT

## 2024-09-28 PROCEDURE — 83036 HEMOGLOBIN GLYCOSYLATED A1C: CPT

## 2024-09-28 PROCEDURE — 82570 ASSAY OF URINE CREATININE: CPT

## 2024-09-30 DIAGNOSIS — E11.9 TYPE 2 DIABETES MELLITUS WITHOUT COMPLICATION, WITHOUT LONG-TERM CURRENT USE OF INSULIN (HCC): ICD-10-CM

## 2024-09-30 RX ORDER — GLIMEPIRIDE 1 MG/1
1 TABLET ORAL
Qty: 90 TABLET | Refills: 1 | Status: SHIPPED | OUTPATIENT
Start: 2024-09-30

## 2024-10-01 ENCOUNTER — OFFICE VISIT (OUTPATIENT)
Dept: FAMILY MEDICINE CLINIC | Facility: CLINIC | Age: 24
End: 2024-10-01
Payer: COMMERCIAL

## 2024-10-01 VITALS
DIASTOLIC BLOOD PRESSURE: 68 MMHG | HEART RATE: 76 BPM | WEIGHT: 181 LBS | HEIGHT: 66 IN | SYSTOLIC BLOOD PRESSURE: 130 MMHG | BODY MASS INDEX: 29.09 KG/M2 | RESPIRATION RATE: 16 BRPM | TEMPERATURE: 97.7 F

## 2024-10-01 DIAGNOSIS — H33.23 BILATERAL RETINAL DETACHMENT: ICD-10-CM

## 2024-10-01 DIAGNOSIS — J20.9 ACUTE BRONCHITIS, UNSPECIFIED ORGANISM: Primary | ICD-10-CM

## 2024-10-01 DIAGNOSIS — E11.9 TYPE 2 DIABETES MELLITUS WITHOUT COMPLICATION, WITHOUT LONG-TERM CURRENT USE OF INSULIN (HCC): ICD-10-CM

## 2024-10-01 DIAGNOSIS — F84.0 AUTISM: ICD-10-CM

## 2024-10-01 DIAGNOSIS — J30.2 SEASONAL ALLERGIES: ICD-10-CM

## 2024-10-01 PROCEDURE — 99214 OFFICE O/P EST MOD 30 MIN: CPT | Performed by: FAMILY MEDICINE

## 2024-10-01 RX ORDER — DORZOLAMIDE HYDROCHLORIDE AND TIMOLOL MALEATE 20; 5 MG/ML; MG/ML
1 SOLUTION/ DROPS OPHTHALMIC EVERY 12 HOURS
COMMUNITY
Start: 2024-09-20

## 2024-10-01 RX ORDER — CETIRIZINE HYDROCHLORIDE 10 MG/1
10 TABLET ORAL DAILY
Qty: 90 TABLET | Refills: 1 | Status: SHIPPED | OUTPATIENT
Start: 2024-10-01

## 2024-10-01 RX ORDER — BENZONATATE 200 MG/1
200 CAPSULE ORAL 3 TIMES DAILY PRN
Qty: 20 CAPSULE | Refills: 0 | Status: SHIPPED | OUTPATIENT
Start: 2024-10-01

## 2024-10-01 RX ORDER — BLOOD SUGAR DIAGNOSTIC
STRIP MISCELLANEOUS
Qty: 100 EACH | Refills: 3 | Status: SHIPPED | OUTPATIENT
Start: 2024-10-01

## 2024-10-01 RX ORDER — BRIMONIDINE TARTRATE 2 MG/ML
1 SOLUTION/ DROPS OPHTHALMIC 3 TIMES DAILY
COMMUNITY
Start: 2024-09-20

## 2024-10-01 RX ORDER — AZITHROMYCIN 250 MG/1
TABLET, FILM COATED ORAL
Qty: 6 TABLET | Refills: 0 | Status: CANCELLED | OUTPATIENT
Start: 2024-10-01 | End: 2024-10-06

## 2024-10-01 RX ORDER — ACETAZOLAMIDE 500 MG/1
500 CAPSULE, EXTENDED RELEASE ORAL 2 TIMES DAILY
COMMUNITY
Start: 2024-09-30

## 2024-10-01 RX ORDER — AZITHROMYCIN 250 MG/1
TABLET, FILM COATED ORAL
Qty: 6 TABLET | Refills: 0 | Status: SHIPPED | OUTPATIENT
Start: 2024-10-01 | End: 2024-10-06

## 2024-10-01 NOTE — PROGRESS NOTES
Ambulatory Visit  Name: Piotr Fairchild      : 2000      MRN: [id number]  Encounter Provider: Champ Kelly DO  Encounter Date: 10/1/2024   Encounter department[facility]    Assessment & Plan  Acute bronchitis, unspecified organism  Patient was seen in the emergency department  diagnosed with bronchitis provided with amoxicillin and Tessalon Perles.  Notes were reviewed and appreciated.  The patient however continues with cough.  The duration is greater than 2 weeks suspect mycoplasma will provide a Zithromax Z-GITA and we noted Tessalon Perles  Orders:    benzonatate (TESSALON) 200 MG capsule; Take 1 capsule (200 mg total) by mouth 3 (three) times a day as needed for cough    azithromycin (Zithromax) 250 mg tablet; Take 2 tablets (500 mg total) by mouth daily for 1 day, THEN 1 tablet (250 mg total) daily for 4 days.    Type 2 diabetes mellitus without complication, without long-term current use of insulin ([facility])    Lab Results   Component Value Date    HGBA1C 6.6 (H) 2024     Glucose is well-controlled and hemoglobin A1c is at 6.6  Orders:    glucose blood (OneTouch Verio) test strip; Check blood sugars once daily. Please substitute with appropriate alternative as covered by patient's insurance. Dx: E11.65    Comprehensive metabolic panel; Future    Albumin / creatinine urine ratio; Future    Comprehensive metabolic panel; Future    Hemoglobin A1C; Future    TSH, 3rd generation with Free T4 reflex; Future    Lipid Panel with Direct LDL reflex; Future    Autism  At baseline       Seasonal allergies  Zyrtec will be renewed  Orders:    cetirizine (ZyrTEC) 10 mg tablet; Take 1 tablet (10 mg total) by mouth daily    Bilateral retinal detachment  Followed by ophthalmology         Depression Screening and Follow-up Plan: Patient was screened for depression during today's encounter. They screened negative with a PHQ-2 score of 0.      History of Present Illness  "    Patient presents accompanied by mother the patient is nonverbal    Eye Problem   Pertinent negatives include no fever or vomiting.   Cough  Pertinent negatives include no chest pain, chills, ear pain, fever, rash, sore throat or shortness of breath.         Review of Systems   Constitutional:  Negative for chills and fever.   HENT:  Negative for ear pain and sore throat.    Eyes:  Positive for visual disturbance. Negative for pain.   Respiratory:  Positive for cough. Negative for shortness of breath.    Cardiovascular:  Negative for chest pain and palpitations.   Gastrointestinal:  Negative for abdominal pain and vomiting.   Genitourinary:  Negative for dysuria and hematuria.   Musculoskeletal:  Negative for arthralgias and back pain.   Skin:  Negative for color change and rash.   Neurological:  Negative for seizures and syncope.   All other systems reviewed and are negative.          Objective     /68   Pulse 76   Temp 97.7 °F (36.5 °C)   Resp 16   Ht 5' 6\" (1.676 m)   Wt 82.1 kg (181 lb)   BMI 29.21 kg/m²     Physical Exam  Constitutional:       Appearance: Normal appearance.   HENT:      Head: Normocephalic and atraumatic.      Right Ear: Tympanic membrane, ear canal and external ear normal.      Left Ear: Tympanic membrane, ear canal and external ear normal.      Nose: Nose normal.      Mouth/Throat:      Mouth: Mucous membranes are moist.      Pharynx: Oropharynx is clear.   Eyes:      Extraocular Movements: Extraocular movements intact.      Conjunctiva/sclera: Conjunctivae normal.      Pupils: Pupils are equal, round, and reactive to light.   Cardiovascular:      Rate and Rhythm: Normal rate and regular rhythm.      Pulses: Normal pulses.      Heart sounds: Normal heart sounds.   Pulmonary:      Effort: Pulmonary effort is normal.      Breath sounds: Normal breath sounds.   Abdominal:      General: Abdomen is flat. Bowel sounds are normal.      Palpations: Abdomen is soft.   Musculoskeletal:   "       General: Normal range of motion.      Cervical back: Normal range of motion.   Skin:     General: Skin is warm and dry.      Capillary Refill: Capillary refill takes less than 2 seconds.   Neurological:      General: No focal deficit present.      Mental Status: He is alert and oriented to person, place, and time.   Psychiatric:         Mood and Affect: Mood normal.         Behavior: Behavior normal.

## 2024-10-01 NOTE — ASSESSMENT & PLAN NOTE
Zyrtec will be renewed  Orders:    cetirizine (ZyrTEC) 10 mg tablet; Take 1 tablet (10 mg total) by mouth daily

## 2024-10-01 NOTE — ASSESSMENT & PLAN NOTE
Lab Results   Component Value Date    HGBA1C 6.6 (H) 09/28/2024     Glucose is well-controlled and hemoglobin A1c is at 6.6  Orders:    glucose blood (OneTouch Verio) test strip; Check blood sugars once daily. Please substitute with appropriate alternative as covered by patient's insurance. Dx: E11.65    Comprehensive metabolic panel; Future    Albumin / creatinine urine ratio; Future    Comprehensive metabolic panel; Future    Hemoglobin A1C; Future    TSH, 3rd generation with Free T4 reflex; Future    Lipid Panel with Direct LDL reflex; Future

## 2024-11-24 DIAGNOSIS — E11.9 TYPE 2 DIABETES MELLITUS WITHOUT COMPLICATION, WITHOUT LONG-TERM CURRENT USE OF INSULIN (HCC): ICD-10-CM

## 2024-12-29 ENCOUNTER — HOSPITAL ENCOUNTER (EMERGENCY)
Facility: HOSPITAL | Age: 24
Discharge: HOME | End: 2024-12-29
Payer: COMMERCIAL

## 2024-12-29 VITALS
DIASTOLIC BLOOD PRESSURE: 79 MMHG | OXYGEN SATURATION: 100 % | RESPIRATION RATE: 18 BRPM | SYSTOLIC BLOOD PRESSURE: 116 MMHG | TEMPERATURE: 98.9 F | HEART RATE: 88 BPM

## 2024-12-29 DIAGNOSIS — H60.93 BILATERAL OTITIS EXTERNA: Primary | ICD-10-CM

## 2024-12-29 PROCEDURE — 99284 EMERGENCY DEPT VISIT MOD MDM: CPT | Performed by: PHYSICIAN ASSISTANT

## 2024-12-29 PROCEDURE — 99282 EMERGENCY DEPT VISIT SF MDM: CPT

## 2024-12-29 RX ORDER — OFLOXACIN 3 MG/ML
10 SOLUTION AURICULAR (OTIC) 2 TIMES DAILY
Qty: 10 ML | Refills: 0 | Status: SHIPPED | OUTPATIENT
Start: 2024-12-29

## 2024-12-29 NOTE — ED PROVIDER NOTES
Time reflects when diagnosis was documented in both MDM as applicable and the Disposition within this note       Time User Action Codes Description Comment    12/29/2024 10:58 AM Antonio Johnston Add [H60.93] Bilateral otitis externa           ED Disposition       ED Disposition   Discharge    Condition   Stable    Date/Time   Sun Dec 29, 2024 10:57 AM    Comment   Piotr Fairchild discharge to home/self care.                   Assessment & Plan       Medical Decision Making  24-year-old male here for evaluation of bilateral ear pain for about a day.  He is autistic.  Nonverbal at baseline.  Well-appearing at baseline.  Differential diagnosis includes otitis media, otitis externa, tympanic membrane perforation, otic foreign body, mastoiditis.  Examination findings not consistent with otitis media there is some inflammatory and exudative changes therefore we will treat for otitis externa with topical antibiotics.        Risk  Prescription drug management.        ED Course as of 12/29/24 1101   Sun Dec 29, 2024   1045 SpO2: 96 %   1045 Respirations: 18   1045 Pulse(!): 119   1045 Temperature: 98.9 °F (37.2 °C)   1045 Blood Pressure: 114/78  Vital signs reviewed mild tachycardia noted       Medications - No data to display    ED Risk Strat Scores                          SBIRT 22yo+      Flowsheet Row Most Recent Value   Initial Alcohol Screen: US AUDIT-C     1. How often do you have a drink containing alcohol? 0 Filed at: 12/29/2024 1044   2. How many drinks containing alcohol do you have on a typical day you are drinking?  0 Filed at: 12/29/2024 1044   3a. Male UNDER 65: How often do you have five or more drinks on one occasion? 0 Filed at: 12/29/2024 1044   Audit-C Score 0 Filed at: 12/29/2024 1044   PATRICIA: How many times in the past year have you...    Used an illegal drug or used a prescription medication for non-medical reasons? Never Filed at: 12/29/2024 1044                            History of Present Illness        Chief Complaint   Patient presents with    Earache     Bilateral ear pain. Per patients care givers, patients is autistic and non-verbal, unsure of how long patient has been having the ear pain        Past Medical History:   Diagnosis Date    Allergic     Autism     Diabetes mellitus (HCC)     type 2 without complications    Non-verbal learning disorder     Autism    Seasonal allergies       Past Surgical History:   Procedure Laterality Date    CATARACT EXTRACTION, BILATERAL  2003    RETINAL DETACHMENT SURGERY Bilateral       Family History   Problem Relation Age of Onset    No Known Problems Mother     No Known Problems Father     Alcohol abuse Maternal Grandmother     Mental illness Maternal Aunt       Social History     Tobacco Use    Smoking status: Never     Passive exposure: Yes    Smokeless tobacco: Never   Vaping Use    Vaping status: Never Used   Substance Use Topics    Alcohol use: No    Drug use: No      E-Cigarette/Vaping    E-Cigarette Use Never User       E-Cigarette/Vaping Substances    Nicotine No     THC No     CBD No     Flavoring No     Other No     Unknown No       I have reviewed and agree with the history as documented.     This is a 24-year-old male presenting with family for evaluation of bilateral ear pain for the last few days.  He has been pulling at the ears.  Patient has a history of autism and is chronically nonverbal.  There is no history of a cough however no fever.  No history of a sore throat.  Otherwise well-appearing.      Earache  Associated symptoms: no abdominal pain, no cough, no fever, no rash, no sore throat and no vomiting        Review of Systems   Constitutional: Negative.  Negative for chills and fever.   HENT:  Positive for ear pain. Negative for sore throat.    Eyes: Negative.  Negative for pain and visual disturbance.   Respiratory: Negative.  Negative for cough and shortness of breath.    Cardiovascular: Negative.  Negative for chest pain and palpitations.    Gastrointestinal: Negative.  Negative for abdominal pain and vomiting.   Endocrine: Negative.    Genitourinary: Negative.  Negative for dysuria and hematuria.   Musculoskeletal: Negative.  Negative for arthralgias and back pain.   Skin: Negative.  Negative for color change and rash.   Allergic/Immunologic: Negative.    Neurological: Negative.  Negative for seizures and syncope.   Hematological: Negative.    Psychiatric/Behavioral: Negative.     All other systems reviewed and are negative.          Objective       ED Triage Vitals   Temperature Pulse Blood Pressure Respirations SpO2 Patient Position - Orthostatic VS   12/29/24 1043 12/29/24 1043 12/29/24 1045 12/29/24 1043 12/29/24 1043 12/29/24 1045   98.9 °F (37.2 °C) (!) 119 114/78 18 96 % Sitting      Temp Source Heart Rate Source BP Location FiO2 (%) Pain Score    12/29/24 1043 12/29/24 1043 12/29/24 1045 -- --    Temporal Monitor Right arm        Vitals      Date and Time Temp Pulse SpO2 Resp BP Pain Score FACES Pain Rating User   12/29/24 1045 -- -- -- -- 114/78 -- -- [place]   12/29/24 1043 98.9 °F (37.2 °C) 119 96 % 18 -- -- 2 [place]            Physical Exam  Vitals reviewed.   Constitutional:       General: He is not in acute distress.     Appearance: Normal appearance. He is not ill-appearing, toxic-appearing or diaphoretic.   HENT:      Head: Normocephalic and atraumatic.      Right Ear: Tympanic membrane normal.      Left Ear: Tympanic membrane normal.      Ears:      Comments: Bilateral tympanic membranes are not erythematous or bulging.  There is mild inflammatory exudative changes of the bilateral canals consistent with early otitis externa.     Nose: No congestion or rhinorrhea.      Mouth/Throat:      Pharynx: No oropharyngeal exudate or posterior oropharyngeal erythema.   Eyes:      General: No scleral icterus.        Right eye: No discharge.         Left eye: No discharge.      Extraocular Movements: Extraocular movements intact.      Conjunctiva/sclera:  Conjunctivae normal.   Cardiovascular:      Rate and Rhythm: Normal rate.      Pulses: Normal pulses.   Pulmonary:      Effort: Pulmonary effort is normal. No respiratory distress.      Breath sounds: No stridor.   Musculoskeletal:         General: No deformity or signs of injury.      Cervical back: Normal range of motion. No rigidity.   Skin:     General: Skin is warm.      Coloration: Skin is not jaundiced.      Findings: No lesion or rash.   Neurological:      General: No focal deficit present.      Mental Status: He is alert and oriented to person, place, and time. Mental status is at baseline.      Gait: Gait normal.   Psychiatric:         Mood and Affect: Mood normal.         Thought Content: Thought content normal.         Judgment: Judgment normal.         Results Reviewed       None            No orders to display       Procedures    ED Medication and Procedure Management   Prior to Admission Medications   Prescriptions Last Dose Informant Patient Reported? Taking?   Blood Glucose Monitoring Suppl (OneTouch Verio Reflect) w/Device KIT  Self No No   Sig: Check blood sugars once daily. Please substitute with appropriate alternative as covered by patient's insurance. Dx: E11.65   Cyanocobalamin (VITAMIN B-12) 6000 MCG SUBL  Father, Self Yes No   Sig: Place 6,000 mcg under the tongue daily   OneTouch Delica Lancets 33G MISC  Self No No   Sig: Check blood sugars once daily. Please substitute with appropriate alternative as covered by patient's insurance. Dx: E11.65   Patient not taking: Reported on 1/11/2024   acetaZOLAMIDE (DIAMOX) 500 mg capsule   Yes No   Sig: Take 500 mg by mouth 2 (two) times a day   ascorbic acid (VITAMIN C) 250 mg tablet  Self Yes No   Sig: Take 250 mg by mouth daily   benzonatate (TESSALON) 200 MG capsule   No No   Sig: Take 1 capsule (200 mg total) by mouth 3 (three) times a day as needed for cough   benzoyl peroxide 5 % gel  Self No No   Sig: Apply topically 2 (two) times a day    brimonidine tartrate 0.2 % ophthalmic solution   Yes No   Sig: Administer 1 drop into the left eye 3 (three) times a day   cetirizine (ZyrTEC) 10 mg tablet   No No   Sig: Take 1 tablet (10 mg total) by mouth daily   clindamycin (CLEOCIN T) 1 % external solution  Self No No   Sig: Apply topically 2 (two) times a day   dorzolamide-timolol (COSOPT) 2-0.5 % ophthalmic solution   Yes No   Sig: Administer 1 drop into the left eye every 12 (twelve) hours   fluticasone (FLONASE) 50 mcg/act nasal spray  Self No No   Si sprays into each nostril daily   glimepiride (AMARYL) 1 mg tablet   No No   Sig: take 1 tablet by mouth daily with breakfast   glucose blood (OneTouch Verio) test strip   No No   Sig: Check blood sugars once daily. Please substitute with appropriate alternative as covered by patient's insurance. Dx: E11.65   metFORMIN (GLUCOPHAGE) 1000 MG tablet   No No   Sig: take 1 tablet by mouth twice a day   ondansetron (ZOFRAN) 4 mg tablet   No No   Sig: Take 1 tablet (4 mg total) by mouth every 8 (eight) hours as needed for nausea or vomiting   ondansetron (Zofran ODT) 4 mg disintegrating tablet  Self No No   Sig: Take 1 tablet (4 mg total) by mouth every 6 (six) hours as needed for nausea or vomiting   prednisoLONE acetate (PRED FORTE) 1 % ophthalmic suspension  Self Yes No   Sig: Administer 1 drop to both eyes 3 (three) times a day      Facility-Administered Medications: None     Patient's Medications   Discharge Prescriptions    OFLOXACIN (FLOXIN) 0.3 % OTIC SOLUTION    Administer 10 drops into both ears 2 (two) times a day       Start Date: 2024End Date: --       Order Dose: 10 drops       Quantity: 10 mL    Refills: 0     No discharge procedures on file.  ED SEPSIS DOCUMENTATION   Time reflects when diagnosis was documented in both MDM as applicable and the Disposition within this note       Time User Action Codes Description Comment    2024 10:58 AM Antonio Johnston Add [H60.93] Bilateral otitis  externa                  Antonio Johnston PA-C  12/29/24 1102

## 2025-01-22 ENCOUNTER — APPOINTMENT (OUTPATIENT)
Dept: LAB | Facility: HOSPITAL | Age: 25
End: 2025-01-22
Payer: COMMERCIAL

## 2025-01-22 DIAGNOSIS — E11.9 TYPE 2 DIABETES MELLITUS WITHOUT COMPLICATION, WITHOUT LONG-TERM CURRENT USE OF INSULIN (HCC): ICD-10-CM

## 2025-01-22 LAB
ALBUMIN SERPL BCG-MCNC: 4.8 G/DL (ref 3.5–5)
ALP SERPL-CCNC: 63 U/L (ref 34–104)
ALT SERPL W P-5'-P-CCNC: 28 U/L (ref 7–52)
ANION GAP SERPL CALCULATED.3IONS-SCNC: 10 MMOL/L (ref 4–13)
AST SERPL W P-5'-P-CCNC: 18 U/L (ref 13–39)
BILIRUB SERPL-MCNC: 0.57 MG/DL (ref 0.2–1)
BUN SERPL-MCNC: 9 MG/DL (ref 5–25)
CALCIUM SERPL-MCNC: 9.3 MG/DL (ref 8.4–10.2)
CHLORIDE SERPL-SCNC: 100 MMOL/L (ref 96–108)
CHOLEST SERPL-MCNC: 168 MG/DL (ref ?–200)
CO2 SERPL-SCNC: 26 MMOL/L (ref 21–32)
CREAT SERPL-MCNC: 0.76 MG/DL (ref 0.6–1.3)
CREAT UR-MCNC: 130.1 MG/DL
EST. AVERAGE GLUCOSE BLD GHB EST-MCNC: 209 MG/DL
GFR SERPL CREATININE-BSD FRML MDRD: 126 ML/MIN/1.73SQ M
GLUCOSE P FAST SERPL-MCNC: 166 MG/DL (ref 65–99)
HBA1C MFR BLD: 8.9 %
HDLC SERPL-MCNC: 45 MG/DL
LDLC SERPL CALC-MCNC: 94 MG/DL (ref 0–100)
MICROALBUMIN UR-MCNC: 27.2 MG/L
MICROALBUMIN/CREAT 24H UR: 21 MG/G CREATININE (ref 0–30)
POTASSIUM SERPL-SCNC: 4.3 MMOL/L (ref 3.5–5.3)
PROT SERPL-MCNC: 7.6 G/DL (ref 6.4–8.4)
SODIUM SERPL-SCNC: 136 MMOL/L (ref 135–147)
TRIGL SERPL-MCNC: 146 MG/DL (ref ?–150)
TSH SERPL DL<=0.05 MIU/L-ACNC: 1.28 UIU/ML (ref 0.45–4.5)

## 2025-01-22 PROCEDURE — 36415 COLL VENOUS BLD VENIPUNCTURE: CPT

## 2025-01-22 PROCEDURE — 82043 UR ALBUMIN QUANTITATIVE: CPT

## 2025-01-22 PROCEDURE — 80053 COMPREHEN METABOLIC PANEL: CPT

## 2025-01-22 PROCEDURE — 83036 HEMOGLOBIN GLYCOSYLATED A1C: CPT

## 2025-01-22 PROCEDURE — 82570 ASSAY OF URINE CREATININE: CPT

## 2025-01-22 PROCEDURE — 84443 ASSAY THYROID STIM HORMONE: CPT

## 2025-01-22 PROCEDURE — 80061 LIPID PANEL: CPT

## 2025-02-04 ENCOUNTER — OFFICE VISIT (OUTPATIENT)
Dept: FAMILY MEDICINE CLINIC | Facility: CLINIC | Age: 25
End: 2025-02-04
Payer: COMMERCIAL

## 2025-02-04 VITALS
BODY MASS INDEX: 30.22 KG/M2 | HEIGHT: 66 IN | WEIGHT: 188 LBS | DIASTOLIC BLOOD PRESSURE: 68 MMHG | RESPIRATION RATE: 16 BRPM | TEMPERATURE: 98.1 F | SYSTOLIC BLOOD PRESSURE: 130 MMHG | HEART RATE: 76 BPM

## 2025-02-04 DIAGNOSIS — E11.9 TYPE 2 DIABETES MELLITUS WITHOUT COMPLICATION, WITHOUT LONG-TERM CURRENT USE OF INSULIN (HCC): ICD-10-CM

## 2025-02-04 DIAGNOSIS — Z00.00 ANNUAL PHYSICAL EXAM: Primary | ICD-10-CM

## 2025-02-04 DIAGNOSIS — F84.0 AUTISM: ICD-10-CM

## 2025-02-04 DIAGNOSIS — H33.23 BILATERAL RETINAL DETACHMENT: ICD-10-CM

## 2025-02-04 DIAGNOSIS — J30.2 SEASONAL ALLERGIES: ICD-10-CM

## 2025-02-04 PROCEDURE — 99214 OFFICE O/P EST MOD 30 MIN: CPT | Performed by: FAMILY MEDICINE

## 2025-02-04 PROCEDURE — 99395 PREV VISIT EST AGE 18-39: CPT | Performed by: FAMILY MEDICINE

## 2025-02-04 RX ORDER — GLIMEPIRIDE 1 MG/1
1 TABLET ORAL EVERY 12 HOURS
Qty: 180 TABLET | Refills: 1 | Status: SHIPPED | OUTPATIENT
Start: 2025-02-04

## 2025-02-04 RX ORDER — GLIMEPIRIDE 1 MG/1
1 TABLET ORAL
Qty: 180 TABLET | Refills: 1 | Status: SHIPPED | OUTPATIENT
Start: 2025-02-04 | End: 2025-02-04

## 2025-02-04 RX ORDER — AZELASTINE 1 MG/ML
2 SPRAY, METERED NASAL DAILY
COMMUNITY

## 2025-02-04 RX ORDER — LATANOPROST 50 UG/ML
1 SOLUTION/ DROPS OPHTHALMIC
COMMUNITY

## 2025-02-04 NOTE — ASSESSMENT & PLAN NOTE
The hemoglobin A1c is elevated at 8.9.  Will increase Amaryl to 1 mg at breakfast and 1 mg at supper.  Lab Results   Component Value Date    HGBA1C 8.9 (H) 01/22/2025     Orders:    glimepiride (AMARYL) 1 mg tablet; Take 1 tablet (1 mg total) by mouth daily with breakfast    Albumin / creatinine urine ratio; Future    Comprehensive metabolic panel; Future    Hemoglobin A1C; Future    CBC and differential; Future    TSH, 3rd generation with Free T4 reflex; Future    Lipid Panel with Direct LDL reflex; Future

## 2025-02-04 NOTE — PROGRESS NOTES
Adult Annual Physical  Name: Piotr Fairchild      : 2000      MRN: [id number]  Encounter Provider: Champ Kelly DO  Encounter Date: 2025   Encounter department: [facility]    Assessment & Plan  Annual physical exam         Type 2 diabetes mellitus without complication, without long-term current use of insulin (HCC)  The hemoglobin A1c is elevated at 8.9.  Will increase Amaryl to 1 mg at breakfast and 1 mg at supper.  Lab Results   Component Value Date    HGBA1C 8.9 (H) 2025     Orders:    glimepiride (AMARYL) 1 mg tablet; Take 1 tablet (1 mg total) by mouth daily with breakfast    Albumin / creatinine urine ratio; Future    Comprehensive metabolic panel; Future    Hemoglobin A1C; Future    CBC and differential; Future    TSH, 3rd generation with Free T4 reflex; Future    Lipid Panel with Direct LDL reflex; Future    Autism  At baseline       Bilateral retinal detachment  Under the care of ophthalmology       Seasonal allergies  Treated with Zyrtec 10 mg daily       Immunizations and preventive care screenings were discussed with patient today. Appropriate education was printed on patient's after visit summary.    Counseling:  Alcohol/drug use: discussed moderation in alcohol intake, the recommendations for healthy alcohol use, and avoidance of illicit drug use.  Dental Health: discussed importance of regular tooth brushing, flossing, and dental visits.  Injury prevention: discussed safety/seat belts, safety helmets, smoke detectors, carbon monoxide detectors, and smoking near bedding or upholstery.  Sexual health: discussed sexually transmitted diseases, partner selection, use of condoms, avoidance of unintended pregnancy, and contraceptive alternatives.  Exercise: the importance of regular exercise/physical activity was discussed. Recommend exercise 3-5 times per week for at least 30 minutes.       Depression Screening and Follow-up Plan: Patient was screened for  "depression during today's encounter. They screened negative with a PHQ-2 score of 0.        History of Present Illness     Adult Annual Physical:  Patient presents for annual physical.     Diet and Physical Activity:  - Diet/Nutrition: poor diet.  - Exercise: no formal exercise.    Depression Screening:  - PHQ-2 Score: 0    General Health:  - Sleep: sleeps well and 7-8 hours of sleep on average.  - Hearing: normal hearing bilateral ears.  - Vision: vision problems.  - Dental: no dental visits for > 1 year and does not floss.     Health:  - History of STDs: no.   - Urinary symptoms: none.     Advanced Care Planning:  - Has an advanced directive?: no    - Has a durable medical POA?: no    - ACP document given to patient?: yes      Review of Systems   Constitutional:  Negative for chills and fever.   HENT:  Negative for ear pain and sore throat.    Eyes:  Negative for pain and visual disturbance.   Respiratory:  Negative for cough and shortness of breath.    Cardiovascular:  Negative for chest pain and palpitations.   Gastrointestinal:  Negative for abdominal pain and vomiting.   Genitourinary:  Negative for dysuria and hematuria.   Musculoskeletal:  Negative for arthralgias and back pain.   Skin:  Negative for color change and rash.   Neurological:  Negative for seizures and syncope.   All other systems reviewed and are negative.        Objective   /68   Pulse 76   Temp 98.1 °F (36.7 °C)   Resp 16   Ht 5' 6\" (1.676 m)   Wt 85.3 kg (188 lb)   BMI 30.34 kg/m²     Physical Exam  Constitutional:       Appearance: Normal appearance.   HENT:      Head: Normocephalic and atraumatic.      Right Ear: Tympanic membrane, ear canal and external ear normal.      Left Ear: Tympanic membrane, ear canal and external ear normal.      Nose: Nose normal.      Mouth/Throat:      Mouth: Mucous membranes are moist.      Pharynx: Oropharynx is clear.   Eyes:      Extraocular Movements: Extraocular movements intact.      " Conjunctiva/sclera: Conjunctivae normal.      Pupils: Pupils are equal, round, and reactive to light.   Cardiovascular:      Rate and Rhythm: Normal rate and regular rhythm.      Pulses: Normal pulses.      Heart sounds: Normal heart sounds.   Pulmonary:      Effort: Pulmonary effort is normal.      Breath sounds: Normal breath sounds.   Abdominal:      General: Abdomen is flat. Bowel sounds are normal.      Palpations: Abdomen is soft.   Musculoskeletal:         General: Normal range of motion.      Cervical back: Normal range of motion.   Skin:     General: Skin is warm and dry.      Capillary Refill: Capillary refill takes less than 2 seconds.   Neurological:      General: No focal deficit present.      Mental Status: He is alert and oriented to person, place, and time.   Psychiatric:         Mood and Affect: Mood normal.         Behavior: Behavior normal.

## 2025-02-04 NOTE — ADDENDUM NOTE
Addended by: JEFF PERKINS on: 2/4/2025 03:10 PM     Modules accepted: Level of Service    
Addended by: JEFF PERKINS on: 2/4/2025 03:24 PM     Modules accepted: Orders    
Addended by: JOEY PERKINS on: 2/4/2025 03:23 PM     Modules accepted: Orders    
Implemented All Fall with Harm Risk Interventions:  [place] to call system. Call bell, personal items and telephone within reach. Instruct patient to call for assistance. Room bathroom lighting operational. Non-slip footwear when patient is off stretcher. Physically safe environment: no spills, clutter or unnecessary equipment. Stretcher in lowest position, wheels locked, appropriate side rails in place. Provide visual cue, wrist band, yellow gown, etc. Monitor gait and stability. Monitor for mental status changes and reorient to person, place, and time. Review medications for side effects contributing to fall risk. Reinforce activity limits and safety measures with patient and family. Provide visual clues: red socks.

## 2025-04-16 DIAGNOSIS — J30.2 SEASONAL ALLERGIES: ICD-10-CM

## 2025-04-16 RX ORDER — CETIRIZINE HYDROCHLORIDE 10 MG/1
10 TABLET ORAL DAILY
Qty: 90 TABLET | Refills: 0 | Status: SHIPPED | OUTPATIENT
Start: 2025-04-16

## 2025-05-17 ENCOUNTER — APPOINTMENT (OUTPATIENT)
Dept: LAB | Facility: HOSPITAL | Age: 25
End: 2025-05-17
Payer: COMMERCIAL

## 2025-05-17 DIAGNOSIS — E11.9 TYPE 2 DIABETES MELLITUS WITHOUT COMPLICATION, WITHOUT LONG-TERM CURRENT USE OF INSULIN (HCC): ICD-10-CM

## 2025-05-17 LAB
ALBUMIN SERPL BCG-MCNC: 4.9 G/DL (ref 3.5–5)
ALP SERPL-CCNC: 58 U/L (ref 34–104)
ALT SERPL W P-5'-P-CCNC: 20 U/L (ref 7–52)
ANION GAP SERPL CALCULATED.3IONS-SCNC: 12 MMOL/L (ref 4–13)
AST SERPL W P-5'-P-CCNC: 16 U/L (ref 13–39)
BASOPHILS # BLD AUTO: 0.06 THOUSANDS/ÂΜL (ref 0–0.1)
BASOPHILS NFR BLD AUTO: 1 % (ref 0–1)
BILIRUB SERPL-MCNC: 0.41 MG/DL (ref 0.2–1)
BUN SERPL-MCNC: 12 MG/DL (ref 5–25)
CALCIUM SERPL-MCNC: 9.4 MG/DL (ref 8.4–10.2)
CHLORIDE SERPL-SCNC: 103 MMOL/L (ref 96–108)
CHOLEST SERPL-MCNC: 168 MG/DL (ref ?–200)
CO2 SERPL-SCNC: 22 MMOL/L (ref 21–32)
CREAT SERPL-MCNC: 0.78 MG/DL (ref 0.6–1.3)
CREAT UR-MCNC: 177.2 MG/DL
EOSINOPHIL # BLD AUTO: 0.09 THOUSAND/ÂΜL (ref 0–0.61)
EOSINOPHIL NFR BLD AUTO: 1 % (ref 0–6)
ERYTHROCYTE [DISTWIDTH] IN BLOOD BY AUTOMATED COUNT: 13.2 % (ref 11.6–15.1)
EST. AVERAGE GLUCOSE BLD GHB EST-MCNC: 131 MG/DL
GFR SERPL CREATININE-BSD FRML MDRD: 125 ML/MIN/1.73SQ M
GLUCOSE P FAST SERPL-MCNC: 121 MG/DL (ref 65–99)
HBA1C MFR BLD: 6.2 %
HCT VFR BLD AUTO: 43.6 % (ref 36.5–49.3)
HDLC SERPL-MCNC: 41 MG/DL
HGB BLD-MCNC: 14.5 G/DL (ref 12–17)
IMM GRANULOCYTES # BLD AUTO: 0.01 THOUSAND/UL (ref 0–0.2)
IMM GRANULOCYTES NFR BLD AUTO: 0 % (ref 0–2)
LDLC SERPL CALC-MCNC: 98 MG/DL (ref 0–100)
LYMPHOCYTES # BLD AUTO: 2.34 THOUSANDS/ÂΜL (ref 0.6–4.47)
LYMPHOCYTES NFR BLD AUTO: 36 % (ref 14–44)
MCH RBC QN AUTO: 29.1 PG (ref 26.8–34.3)
MCHC RBC AUTO-ENTMCNC: 33.3 G/DL (ref 31.4–37.4)
MCV RBC AUTO: 88 FL (ref 82–98)
MICROALBUMIN UR-MCNC: 23.6 MG/L
MICROALBUMIN/CREAT 24H UR: 13 MG/G CREATININE (ref 0–30)
MONOCYTES # BLD AUTO: 0.49 THOUSAND/ÂΜL (ref 0.17–1.22)
MONOCYTES NFR BLD AUTO: 8 % (ref 4–12)
NEUTROPHILS # BLD AUTO: 3.43 THOUSANDS/ÂΜL (ref 1.85–7.62)
NEUTS SEG NFR BLD AUTO: 54 % (ref 43–75)
NRBC BLD AUTO-RTO: 0 /100 WBCS
PLATELET # BLD AUTO: 212 THOUSANDS/UL (ref 149–390)
PMV BLD AUTO: 10 FL (ref 8.9–12.7)
POTASSIUM SERPL-SCNC: 4.2 MMOL/L (ref 3.5–5.3)
PROT SERPL-MCNC: 7.4 G/DL (ref 6.4–8.4)
RBC # BLD AUTO: 4.98 MILLION/UL (ref 3.88–5.62)
SODIUM SERPL-SCNC: 137 MMOL/L (ref 135–147)
TRIGL SERPL-MCNC: 145 MG/DL (ref ?–150)
TSH SERPL DL<=0.05 MIU/L-ACNC: 2.51 UIU/ML (ref 0.45–4.5)
WBC # BLD AUTO: 6.42 THOUSAND/UL (ref 4.31–10.16)

## 2025-05-17 PROCEDURE — 84443 ASSAY THYROID STIM HORMONE: CPT

## 2025-05-17 PROCEDURE — 80053 COMPREHEN METABOLIC PANEL: CPT

## 2025-05-17 PROCEDURE — 85025 COMPLETE CBC W/AUTO DIFF WBC: CPT

## 2025-05-17 PROCEDURE — 36415 COLL VENOUS BLD VENIPUNCTURE: CPT

## 2025-05-17 PROCEDURE — 82043 UR ALBUMIN QUANTITATIVE: CPT

## 2025-05-17 PROCEDURE — 83036 HEMOGLOBIN GLYCOSYLATED A1C: CPT

## 2025-05-17 PROCEDURE — 82570 ASSAY OF URINE CREATININE: CPT

## 2025-05-17 PROCEDURE — 80061 LIPID PANEL: CPT

## 2025-06-06 ENCOUNTER — OFFICE VISIT (OUTPATIENT)
Dept: FAMILY MEDICINE CLINIC | Facility: CLINIC | Age: 25
End: 2025-06-06
Payer: COMMERCIAL

## 2025-06-06 VITALS
SYSTOLIC BLOOD PRESSURE: 126 MMHG | DIASTOLIC BLOOD PRESSURE: 84 MMHG | TEMPERATURE: 99.1 F | HEART RATE: 118 BPM | OXYGEN SATURATION: 98 % | WEIGHT: 179 LBS | HEIGHT: 66 IN | BODY MASS INDEX: 28.77 KG/M2

## 2025-06-06 DIAGNOSIS — E11.9 TYPE 2 DIABETES MELLITUS WITHOUT COMPLICATION, WITHOUT LONG-TERM CURRENT USE OF INSULIN (HCC): Primary | ICD-10-CM

## 2025-06-06 DIAGNOSIS — F84.0 AUTISM: ICD-10-CM

## 2025-06-06 DIAGNOSIS — H33.23 BILATERAL RETINAL DETACHMENT: ICD-10-CM

## 2025-06-06 DIAGNOSIS — J30.2 SEASONAL ALLERGIES: ICD-10-CM

## 2025-06-06 DIAGNOSIS — J01.01 ACUTE RECURRENT MAXILLARY SINUSITIS: ICD-10-CM

## 2025-06-06 PROCEDURE — 99214 OFFICE O/P EST MOD 30 MIN: CPT | Performed by: FAMILY MEDICINE

## 2025-06-06 RX ORDER — AZELASTINE 1 MG/ML
2 SPRAY, METERED NASAL DAILY
Qty: 1 ML | Refills: 5 | Status: SHIPPED | OUTPATIENT
Start: 2025-06-06

## 2025-06-06 RX ORDER — CETIRIZINE HYDROCHLORIDE 10 MG/1
10 TABLET ORAL DAILY
Qty: 90 TABLET | Refills: 0 | Status: SHIPPED | OUTPATIENT
Start: 2025-06-06

## 2025-06-06 RX ORDER — AZITHROMYCIN 250 MG/1
TABLET, FILM COATED ORAL
Qty: 6 TABLET | Refills: 0 | Status: SHIPPED | OUTPATIENT
Start: 2025-06-06 | End: 2025-06-11

## 2025-06-06 NOTE — ASSESSMENT & PLAN NOTE
Doing well with a hemoglobin A1c of 6.2  Lab Results   Component Value Date    HGBA1C 6.2 (H) 05/17/2025       Orders:  •  metFORMIN (GLUCOPHAGE) 1000 MG tablet; Take 1 tablet (1,000 mg total) by mouth 2 (two) times a day  •  Albumin / creatinine urine ratio; Future  •  Comprehensive metabolic panel; Future  •  Hemoglobin A1C; Future  •  TSH, 3rd generation with Free T4 reflex; Future  •  Lipid Panel with Direct LDL reflex; Future

## 2025-06-06 NOTE — ASSESSMENT & PLAN NOTE
We will provide Astelin nasal spray to take along with Zyrtec 10 mg  Orders:  •  cetirizine (ZyrTEC) 10 mg tablet; Take 1 tablet (10 mg total) by mouth daily  •  azelastine (ASTELIN) 0.1 % nasal spray; 2 sprays into each nostril in the morning Use in each nostril as directed

## 2025-06-06 NOTE — ASSESSMENT & PLAN NOTE
Community Based Case Management  Triage     Referral source:[facility], Lolita Tapia RN  Order Comment:Frequent ER visits & hospitalizations, cervical spine surg 4/4/24, recent dx CDiff     Patient is currently admitted: No  Prior referral to [facility]: No  Suitable for outreach: Yes    Chronic Medical condition:Generalized anxiety disorder [F41.1]   Cervical radiculopathy [M54.12]   Diarrhea of infectious origin [A09]   EHR review completed.  Utilization: 89% risk score;   At risk for utilization:  SDOH:Lives in [place]  Further pertinent details noted:  28 ED visits over last 6 months and 2 admissions  Neurosurg has put a limit on how many phone calls patient can make to their dept within 1 week.     Plan: Will outreach [place] [employer] to see if there is an assigned Care Manager for client as this would be a benefit of his plan.   Outreach to Jyoti Ortega at [place] and [facility] to please let us know if there is an assigned Care manager.     Concerns re: anxiety and has been recently prescribed Prozac but has only been using x 1 week. Would benefit from possible talk therapy. Possible referral to  supports through [place] or connect through [facility] in [facility] [phone].                             Currently at baseline

## 2025-06-06 NOTE — PROGRESS NOTES
Name: Piotr Fairchild      : 2000      MRN: [id number]  Encounter Provider: Champ Kelly DO  Encounter Date: 2025   Encounter department: [facility]  :  Assessment & Plan  Type 2 diabetes mellitus without complication, without long-term current use of insulin (HCC)  Doing well with a hemoglobin A1c of 6.2  Lab Results   Component Value Date    HGBA1C 6.2 (H) 2025       Orders:  •  metFORMIN (GLUCOPHAGE) 1000 MG tablet; Take 1 tablet (1,000 mg total) by mouth 2 (two) times a day  •  Albumin / creatinine urine ratio; Future  •  Comprehensive metabolic panel; Future  •  Hemoglobin A1C; Future  •  TSH, 3rd generation with Free T4 reflex; Future  •  Lipid Panel with Direct LDL reflex; Future    Acute recurrent maxillary sinusitis  We will provide Astelin spray and Zithromax in the form of a Z-Ben  Orders:  •  azithromycin (Zithromax) 250 mg tablet; Take 2 tablets (500 mg total) by mouth daily for 1 day, THEN 1 tablet (250 mg total) daily for 4 days.    Seasonal allergies  We will provide Astelin nasal spray to take along with Zyrtec 10 mg  Orders:  •  cetirizine (ZyrTEC) 10 mg tablet; Take 1 tablet (10 mg total) by mouth daily  •  azelastine (ASTELIN) 0.1 % nasal spray; 2 sprays into each nostril in the morning Use in each nostril as directed    Bilateral retinal detachment  Followed by ophthalmology       Autism  Currently at baseline              History of Present Illness   Patient presents accompanied by his mother he is nonverbal due to autism      Review of Systems   Constitutional:  Negative for chills and fever.   HENT:  Positive for congestion, rhinorrhea and sinus pressure. Negative for ear pain and sore throat.    Eyes:  Negative for pain and visual disturbance.   Respiratory:  Negative for cough and shortness of breath.    Cardiovascular:  Negative for chest pain and palpitations.   Gastrointestinal:  Negative for abdominal pain and vomiting.   Genitourinary:  " Negative for dysuria and hematuria.   Musculoskeletal:  Negative for arthralgias and back pain.   Skin:  Negative for color change and rash.   Neurological:  Negative for seizures and syncope.   All other systems reviewed and are negative.      Objective   /84 (BP Location: Left arm, Patient Position: Sitting)   Pulse (!) 118   Temp 99.1 °F (37.3 °C) (Tympanic)   Ht 5' 6\" (1.676 m)   Wt 81.2 kg (179 lb)   SpO2 98%   BMI 28.89 kg/m²      Physical Exam  Constitutional:       Appearance: Normal appearance.   HENT:      Head: Normocephalic and atraumatic.      Right Ear: Tympanic membrane, ear canal and external ear normal.      Left Ear: Tympanic membrane, ear canal and external ear normal.      Nose: Congestion and rhinorrhea present.      Right Sinus: Maxillary sinus tenderness present.      Left Sinus: Maxillary sinus tenderness present.      Mouth/Throat:      Mouth: Mucous membranes are moist.      Pharynx: Oropharynx is clear.     Eyes:      Extraocular Movements: Extraocular movements intact.      Conjunctiva/sclera: Conjunctivae normal.      Pupils: Pupils are equal, round, and reactive to light.       Cardiovascular:      Rate and Rhythm: Normal rate and regular rhythm.      Pulses: Normal pulses.      Heart sounds: Normal heart sounds.   Pulmonary:      Effort: Pulmonary effort is normal.      Breath sounds: Normal breath sounds.   Abdominal:      General: Abdomen is flat. Bowel sounds are normal.      Palpations: Abdomen is soft.     Musculoskeletal:         General: Normal range of motion.      Cervical back: Normal range of motion.     Skin:     General: Skin is warm and dry.      Capillary Refill: Capillary refill takes less than 2 seconds.     Neurological:      General: No focal deficit present.      Mental Status: He is alert and oriented to person, place, and time.     Psychiatric:         Mood and Affect: Mood normal.         Behavior: Behavior normal.         "

## 2025-08-04 DIAGNOSIS — E11.9 TYPE 2 DIABETES MELLITUS WITHOUT COMPLICATION, WITHOUT LONG-TERM CURRENT USE OF INSULIN (HCC): ICD-10-CM

## 2025-08-06 RX ORDER — GLIMEPIRIDE 1 MG/1
1 TABLET ORAL
Qty: 90 TABLET | Refills: 3 | OUTPATIENT
Start: 2025-08-06

## 2025-08-06 RX ORDER — GLIMEPIRIDE 1 MG/1
1 TABLET ORAL EVERY 12 HOURS
Qty: 180 TABLET | Refills: 1 | Status: SHIPPED | OUTPATIENT
Start: 2025-08-06